# Patient Record
Sex: FEMALE | Race: WHITE | Employment: OTHER | URBAN - NONMETROPOLITAN AREA
[De-identification: names, ages, dates, MRNs, and addresses within clinical notes are randomized per-mention and may not be internally consistent; named-entity substitution may affect disease eponyms.]

---

## 2017-09-08 LAB
CREATININE: 0.7 MG/DL
POTASSIUM (K+): 4.1

## 2017-11-30 RX ORDER — ACETAMINOPHEN 500 MG
500 TABLET ORAL 2 TIMES DAILY
COMMUNITY
End: 2018-02-06 | Stop reason: CLARIF

## 2017-11-30 RX ORDER — LANOLIN ALCOHOL/MO/W.PET/CERES
1000 CREAM (GRAM) TOPICAL DAILY
Status: ON HOLD | COMMUNITY
End: 2020-05-28

## 2017-11-30 RX ORDER — DULOXETIN HYDROCHLORIDE 60 MG/1
60 CAPSULE, DELAYED RELEASE ORAL DAILY
COMMUNITY
End: 2018-02-06 | Stop reason: SDUPTHER

## 2017-11-30 RX ORDER — DOCUSATE SODIUM 250 MG
250 CAPSULE ORAL 2 TIMES DAILY
Status: ON HOLD | COMMUNITY
End: 2020-05-28

## 2017-11-30 RX ORDER — TRAMADOL HYDROCHLORIDE 50 MG/1
50 TABLET ORAL EVERY 8 HOURS PRN
COMMUNITY
End: 2017-12-07 | Stop reason: SDUPTHER

## 2017-11-30 RX ORDER — M-VIT,TX,IRON,MINS/CALC/FOLIC 27MG-0.4MG
1 TABLET ORAL DAILY
COMMUNITY

## 2017-11-30 RX ORDER — ATENOLOL 25 MG/1
25 TABLET ORAL 2 TIMES DAILY
COMMUNITY
End: 2018-01-22 | Stop reason: RX

## 2017-11-30 RX ORDER — RANITIDINE 300 MG/1
300 TABLET ORAL NIGHTLY
COMMUNITY
End: 2018-02-05 | Stop reason: SDUPTHER

## 2017-12-07 ENCOUNTER — OFFICE VISIT (OUTPATIENT)
Dept: FAMILY MEDICINE CLINIC | Age: 77
End: 2017-12-07
Payer: MEDICARE

## 2017-12-07 VITALS
SYSTOLIC BLOOD PRESSURE: 150 MMHG | BODY MASS INDEX: 31.78 KG/M2 | HEART RATE: 80 BPM | WEIGHT: 222 LBS | DIASTOLIC BLOOD PRESSURE: 80 MMHG | HEIGHT: 70 IN | RESPIRATION RATE: 18 BRPM

## 2017-12-07 DIAGNOSIS — M25.512 CHRONIC PAIN OF BOTH SHOULDERS: ICD-10-CM

## 2017-12-07 DIAGNOSIS — I10 ESSENTIAL HYPERTENSION: ICD-10-CM

## 2017-12-07 DIAGNOSIS — G89.29 CHRONIC PAIN OF BOTH SHOULDERS: ICD-10-CM

## 2017-12-07 DIAGNOSIS — M25.511 CHRONIC PAIN OF BOTH SHOULDERS: ICD-10-CM

## 2017-12-07 DIAGNOSIS — E11.9 TYPE 2 DIABETES MELLITUS WITHOUT COMPLICATION, WITHOUT LONG-TERM CURRENT USE OF INSULIN (HCC): ICD-10-CM

## 2017-12-07 DIAGNOSIS — I25.10 CORONARY ARTERY DISEASE INVOLVING NATIVE CORONARY ARTERY OF NATIVE HEART WITHOUT ANGINA PECTORIS: ICD-10-CM

## 2017-12-07 DIAGNOSIS — F41.9 ANXIETY: ICD-10-CM

## 2017-12-07 DIAGNOSIS — K21.9 GASTROESOPHAGEAL REFLUX DISEASE WITHOUT ESOPHAGITIS: ICD-10-CM

## 2017-12-07 PROCEDURE — 99204 OFFICE O/P NEW MOD 45 MIN: CPT | Performed by: INTERNAL MEDICINE

## 2017-12-07 PROCEDURE — G8417 CALC BMI ABV UP PARAM F/U: HCPCS | Performed by: INTERNAL MEDICINE

## 2017-12-07 PROCEDURE — G8599 NO ASA/ANTIPLAT THER USE RNG: HCPCS | Performed by: INTERNAL MEDICINE

## 2017-12-07 PROCEDURE — G8427 DOCREV CUR MEDS BY ELIG CLIN: HCPCS | Performed by: INTERNAL MEDICINE

## 2017-12-07 PROCEDURE — 1036F TOBACCO NON-USER: CPT | Performed by: INTERNAL MEDICINE

## 2017-12-07 PROCEDURE — 3046F HEMOGLOBIN A1C LEVEL >9.0%: CPT | Performed by: INTERNAL MEDICINE

## 2017-12-07 PROCEDURE — 1123F ACP DISCUSS/DSCN MKR DOCD: CPT | Performed by: INTERNAL MEDICINE

## 2017-12-07 PROCEDURE — 1090F PRES/ABSN URINE INCON ASSESS: CPT | Performed by: INTERNAL MEDICINE

## 2017-12-07 PROCEDURE — G8482 FLU IMMUNIZE ORDER/ADMIN: HCPCS | Performed by: INTERNAL MEDICINE

## 2017-12-07 PROCEDURE — G8400 PT W/DXA NO RESULTS DOC: HCPCS | Performed by: INTERNAL MEDICINE

## 2017-12-07 PROCEDURE — 4040F PNEUMOC VAC/ADMIN/RCVD: CPT | Performed by: INTERNAL MEDICINE

## 2017-12-07 PROCEDURE — 3017F COLORECTAL CA SCREEN DOC REV: CPT | Performed by: INTERNAL MEDICINE

## 2017-12-07 RX ORDER — PANTOPRAZOLE SODIUM 40 MG/1
40 TABLET, DELAYED RELEASE ORAL DAILY
Qty: 30 TABLET | Refills: 3 | Status: SHIPPED | OUTPATIENT
Start: 2017-12-07 | End: 2018-04-28 | Stop reason: SDUPTHER

## 2017-12-07 RX ORDER — PHENOL 1.4 %
1 AEROSOL, SPRAY (ML) MUCOUS MEMBRANE 2 TIMES DAILY PRN
COMMUNITY
End: 2019-12-21 | Stop reason: ALTCHOICE

## 2017-12-07 RX ORDER — TRAMADOL HYDROCHLORIDE 50 MG/1
50 TABLET ORAL EVERY 8 HOURS PRN
Qty: 60 TABLET | Refills: 0 | Status: SHIPPED | OUTPATIENT
Start: 2017-12-07 | End: 2018-01-19 | Stop reason: SDUPTHER

## 2017-12-07 RX ORDER — OMEPRAZOLE 40 MG/1
40 CAPSULE, DELAYED RELEASE ORAL DAILY
COMMUNITY
End: 2017-12-07 | Stop reason: SDUPTHER

## 2017-12-07 ASSESSMENT — ENCOUNTER SYMPTOMS
ORTHOPNEA: 0
DIARRHEA: 0
CONSTIPATION: 0
CHOKING: 0
HEARTBURN: 1
SHORTNESS OF BREATH: 0
WHEEZING: 0
ABDOMINAL PAIN: 0
COUGH: 1
BELCHING: 1
BLURRED VISION: 0
SORE THROAT: 0
STRIDOR: 0
SINUS PAIN: 0
NAUSEA: 0

## 2017-12-07 NOTE — PROGRESS NOTES
started more than 1 year ago. There has been no history of extremity trauma. The problem occurs daily. The problem has been unchanged. The quality of the pain is described as aching. Pertinent negatives include no fever, joint locking, joint swelling, numbness or tingling. The symptoms are aggravated by activity. She has tried oral narcotics, cold and heat (steroid injections by Dr. Gloria Huffman) for the symptoms. The treatment provided moderate relief. Her past medical history is significant for osteoarthritis. Diabetes   She has type 2 diabetes mellitus. Her disease course has been improving. Hypoglycemia symptoms include nervousness/anxiousness. Pertinent negatives for hypoglycemia include no dizziness, headaches or tremors. Associated symptoms include chest pain. Pertinent negatives for diabetes include no blurred vision, no foot paresthesias, no weakness and no weight loss. Symptoms are improving. There are no diabetic complications. When asked about current treatments, none (diet controlled) were reported. Her weight is stable. She is following a generally healthy, low salt, vegetarian and diabetic diet. She has not had a previous visit with a dietitian. An ACE inhibitor/angiotensin II receptor blocker is not being taken. Eye exam is current.            Past Medical History:     Past Medical History:   Diagnosis Date    Anemia     Chicken pox     Chronic back pain     Diabetes mellitus ([address]     Gastroesophageal reflux disease without esophagitis 12/7/2017    Headache     Hypertension     Measles     Mumps     Osteoarthritis     Whooping cough       Reviewed all health maintenance requirements and ordered appropriate tests  Health Maintenance Due   Topic Date Due    DTaP/Tdap/Td vaccine (1 - Tdap) 09/18/1961    Lipid screen  09/18/1982    Colon cancer screen colonoscopy  09/18/1992    Zostavax vaccine  09/18/2002    DEXA (modify frequency per FRAX score)  09/18/2007    Pneumococcal low/med risk (1 of 2 - PCV13) 09/18/2007       Past Surgical History:     Past Surgical History:   Procedure Laterality Date    CARPAL TUNNEL RELEASE Left 2005    JOINT REPLACEMENT Right 2004    right knee    JOINT REPLACEMENT Left 2005    left knee    JOINT REPLACEMENT Right 2016    right knee    JOINT REPLACEMENT Right 2001    right hip    JOINT REPLACEMENT Left 2006    left hip        Medications:       Prior to Admission medications    Medication Sig Start Date End Date Taking? Authorizing Provider   calcium carbonate 600 MG TABS tablet Take 1 tablet by mouth 2 times daily as needed   Yes Historical Provider, MD   traMADol (ULTRAM) 50 MG tablet Take 1 tablet by mouth every 8 hours as needed for Pain .  12/7/17  Yes Camila Rodriguez MD   pantoprazole (PROTONIX) 40 MG tablet Take 1 tablet by mouth daily 12/7/17  Yes Camila Rodriguez MD   atenolol (TENORMIN) 25 MG tablet Take 25 mg by mouth 2 times daily   Yes Historical Provider, MD   DULoxetine (CYMBALTA) 60 MG extended release capsule Take 60 mg by mouth daily   Yes Historical Provider, MD   ranitidine (ZANTAC) 300 MG tablet Take 300 mg by mouth nightly   Yes Historical Provider, MD   diclofenac sodium 1 % GEL Apply 2 g topically 2 times daily   Yes Historical Provider, MD   vitamin B-12 (CYANOCOBALAMIN) 1000 MCG tablet Take 1,000 mcg by mouth daily   Yes Historical Provider, MD   vitamin D (CHOLECALCIFEROL) 1000 UNIT TABS tablet Take 1,000 Units by mouth 2 times daily   Yes Historical Provider, MD   Glucosamine-Chondroit-Vit C-Mn (GLUCOSAMINE 1500 COMPLEX PO) Take 1 capsule by mouth daily   Yes Historical Provider, MD   docusate sodium (COLACE) 250 MG capsule Take 250 mg by mouth 2 times daily   Yes Historical Provider, MD   Multiple Vitamins-Minerals (THERAPEUTIC MULTIVITAMIN-MINERALS) tablet Take 1 tablet by mouth daily   Yes Historical Provider, MD   acetaminophen (TYLENOL) 500 MG tablet Take 500 mg by mouth 2 times daily   Yes Historical Provider, MD        Allergies: Cardiovascular: Normal rate, regular rhythm and normal heart sounds. No murmur heard. Pulmonary/Chest: Effort normal and breath sounds normal. She has no wheezes. She has no rales. Abdominal: Soft. Bowel sounds are normal. She exhibits no distension and no mass. There is no tenderness. Musculoskeletal: Normal range of motion. She exhibits tenderness (both shoulders). She exhibits no edema or deformity. Lymphadenopathy:     She has no cervical adenopathy. Neurological: She is alert and oriented to person, place, and time. No cranial nerve deficit. Coordination (gait abnormality due to left foot drop) abnormal.   Skin: Skin is warm and dry. No rash noted. Psychiatric: She has a normal mood and affect. Her behavior is normal. Judgment normal.   Vitals reviewed. Data:     No results found for: NA, K, CL, CO2, BUN, CREATININE, GLUCOSE, PROT, LABALBU, BILITOT, ALKPHOS, AST, ALT  No results found for: WBC, RBC, HGB, HCT, MCV, MCH, MCHC, RDW, PLT, MPV  No results found for: TSH  No results found for: CHOL, HDL, PSA, LABA1C       Assessment & Plan       1. Essential hypertension   BP today elevated. Patient attributes this to shoulder pain. We will continue Atenolol, she is instructed to monitor BP at home. If remains elevated, will adjust meds    2. Gastroesophageal reflux disease without esophagitis   Will try Protonix, continue ranitidine, avoid spicy food, prn Tums OTC pantoprazole (PROTONIX) 40 MG tablet   3. Coronary artery disease involving native coronary artery of native heart without angina pectoris   She follows up with Dr. Margaret Acosta [facility] in [place]  Allergic to ASA    4. Type 2 diabetes mellitus without complication, without long-term current use of insulin (HCC)   Diet controlled. Declined HB A1C, today, says recently had done at Dr Abbe Schwartz office. Will request records    5. Chronic pain of both shoulders   On prn Tramadol, OARRS report reviewed, no evidence of drug abuse.   Follow up

## 2018-01-19 ENCOUNTER — TELEPHONE (OUTPATIENT)
Dept: FAMILY MEDICINE CLINIC | Age: 78
End: 2018-01-19

## 2018-01-19 DIAGNOSIS — G89.29 CHRONIC PAIN OF BOTH SHOULDERS: ICD-10-CM

## 2018-01-19 DIAGNOSIS — M25.512 CHRONIC PAIN OF BOTH SHOULDERS: ICD-10-CM

## 2018-01-19 DIAGNOSIS — M25.511 CHRONIC PAIN OF BOTH SHOULDERS: ICD-10-CM

## 2018-01-19 RX ORDER — TRAMADOL HYDROCHLORIDE 50 MG/1
50 TABLET ORAL EVERY 8 HOURS PRN
Qty: 60 TABLET | Refills: 0 | Status: SHIPPED | OUTPATIENT
Start: 2018-01-19 | End: 2018-02-06 | Stop reason: SDUPTHER

## 2018-01-22 ENCOUNTER — TELEPHONE (OUTPATIENT)
Dept: FAMILY MEDICINE CLINIC | Age: 78
End: 2018-01-22

## 2018-01-22 RX ORDER — ATENOLOL 50 MG/1
25 TABLET ORAL DAILY
Qty: 30 TABLET | Refills: 3 | Status: SHIPPED | OUTPATIENT
Start: 2018-01-22 | End: 2018-01-23 | Stop reason: SDUPTHER

## 2018-01-22 NOTE — TELEPHONE ENCOUNTER
Pt was a new patient for you back in Dec.-Daughter called stating they are having trouble getting the Atenolol 25 mg which the pt takes BID-wants to know if we can send in a 50 mg tablet to [address] [place] [address]

## 2018-01-23 ENCOUNTER — TELEPHONE (OUTPATIENT)
Dept: FAMILY MEDICINE CLINIC | Age: 78
End: 2018-01-23

## 2018-01-23 RX ORDER — ATENOLOL 50 MG/1
25 TABLET ORAL 2 TIMES DAILY
Qty: 30 TABLET | Refills: 3 | Status: SHIPPED | OUTPATIENT
Start: 2018-01-23 | End: 2018-02-06 | Stop reason: DRUGHIGH

## 2018-02-05 ENCOUNTER — TELEPHONE (OUTPATIENT)
Dept: FAMILY MEDICINE CLINIC | Age: 78
End: 2018-02-05

## 2018-02-05 RX ORDER — RANITIDINE 300 MG/1
300 TABLET ORAL NIGHTLY
Qty: 30 TABLET | Refills: 3 | Status: SHIPPED | OUTPATIENT
Start: 2018-02-05 | End: 2018-02-06 | Stop reason: SINTOL

## 2018-02-06 ENCOUNTER — OFFICE VISIT (OUTPATIENT)
Dept: FAMILY MEDICINE CLINIC | Age: 78
End: 2018-02-06
Payer: MEDICARE

## 2018-02-06 ENCOUNTER — HOSPITAL ENCOUNTER (INPATIENT)
Age: 78
LOS: 1 days | Discharge: HOME | DRG: 310 | End: 2018-02-08
Attending: INTERNAL MEDICINE | Admitting: INTERNAL MEDICINE
Payer: MEDICARE

## 2018-02-06 ENCOUNTER — HOSPITAL ENCOUNTER (OUTPATIENT)
Age: 78
Discharge: HOME | End: 2018-02-06
Payer: MEDICARE

## 2018-02-06 ENCOUNTER — APPOINTMENT (OUTPATIENT)
Dept: GENERAL RADIOLOGY | Age: 78
DRG: 310 | End: 2018-02-06
Payer: MEDICARE

## 2018-02-06 VITALS
RESPIRATION RATE: 18 BRPM | DIASTOLIC BLOOD PRESSURE: 80 MMHG | HEART RATE: 72 BPM | BODY MASS INDEX: 33.07 KG/M2 | HEIGHT: 70 IN | WEIGHT: 231 LBS | SYSTOLIC BLOOD PRESSURE: 124 MMHG

## 2018-02-06 DIAGNOSIS — G89.29 CHRONIC PAIN OF BOTH SHOULDERS: ICD-10-CM

## 2018-02-06 DIAGNOSIS — K21.9 GASTROESOPHAGEAL REFLUX DISEASE WITHOUT ESOPHAGITIS: ICD-10-CM

## 2018-02-06 DIAGNOSIS — I48.91 ATRIAL FIBRILLATION WITH RVR (HCC): Primary | ICD-10-CM

## 2018-02-06 DIAGNOSIS — M25.511 CHRONIC PAIN OF BOTH SHOULDERS: ICD-10-CM

## 2018-02-06 DIAGNOSIS — I49.9 IRREGULAR HEART BEAT: ICD-10-CM

## 2018-02-06 DIAGNOSIS — I10 ESSENTIAL HYPERTENSION: ICD-10-CM

## 2018-02-06 DIAGNOSIS — M25.512 CHRONIC PAIN OF BOTH SHOULDERS: ICD-10-CM

## 2018-02-06 DIAGNOSIS — E11.9 TYPE 2 DIABETES MELLITUS WITHOUT COMPLICATION, WITHOUT LONG-TERM CURRENT USE OF INSULIN (HCC): Primary | ICD-10-CM

## 2018-02-06 LAB
% CKMB: 5 % (ref 0–3)
-: NORMAL
ABSOLUTE EOS #: 0.1 K/UL (ref 0–0.4)
ABSOLUTE IMMATURE GRANULOCYTE: ABNORMAL K/UL (ref 0–0.3)
ABSOLUTE LYMPH #: 3.5 K/UL (ref 1–4.8)
ABSOLUTE MONO #: 0.8 K/UL (ref 0–1)
ALBUMIN SERPL-MCNC: 4.3 G/DL (ref 3.5–5.2)
ALBUMIN/GLOBULIN RATIO: ABNORMAL (ref 1–2.5)
ALP BLD-CCNC: 64 U/L (ref 35–104)
ALT SERPL-CCNC: 12 U/L (ref 5–33)
AMORPHOUS: NORMAL
ANION GAP SERPL CALCULATED.3IONS-SCNC: 12 MMOL/L (ref 9–17)
ANION GAP SERPL CALCULATED.3IONS-SCNC: 14 MMOL/L (ref 9–17)
AST SERPL-CCNC: 20 U/L
BACTERIA: NORMAL
BASOPHILS # BLD: 1 % (ref 0–2)
BASOPHILS ABSOLUTE: 0 K/UL (ref 0–0.2)
BILIRUB SERPL-MCNC: 1.28 MG/DL (ref 0.3–1.2)
BILIRUBIN URINE: NEGATIVE
BUN BLDV-MCNC: 19 MG/DL (ref 8–23)
BUN BLDV-MCNC: 20 MG/DL (ref 8–23)
BUN/CREAT BLD: 34 (ref 9–20)
BUN/CREAT BLD: 37 (ref 9–20)
CALCIUM SERPL-MCNC: 10.9 MG/DL (ref 8.6–10.4)
CALCIUM SERPL-MCNC: 11 MG/DL (ref 8.6–10.4)
CASTS UA: NORMAL /LPF
CHLORIDE BLD-SCNC: 98 MMOL/L (ref 98–107)
CHLORIDE BLD-SCNC: 99 MMOL/L (ref 98–107)
CK MB: 2.6 NG/ML
CKMB INTERPRETATION: ABNORMAL
CO2: 26 MMOL/L (ref 20–31)
CO2: 28 MMOL/L (ref 20–31)
COLOR: YELLOW
COMMENT UA: ABNORMAL
CREAT SERPL-MCNC: 0.51 MG/DL (ref 0.5–0.9)
CREAT SERPL-MCNC: 0.59 MG/DL (ref 0.5–0.9)
CRYSTALS, UA: NORMAL /HPF
DIFFERENTIAL TYPE: YES
EKG ATRIAL RATE: 120 BPM
EKG ATRIAL RATE: 125 BPM
EKG Q-T INTERVAL: 306 MS
EKG Q-T INTERVAL: 310 MS
EKG QRS DURATION: 94 MS
EKG QRS DURATION: 96 MS
EKG QTC CALCULATION (BAZETT): 426 MS
EKG QTC CALCULATION (BAZETT): 427 MS
EKG R AXIS: 15 DEGREES
EKG R AXIS: 61 DEGREES
EKG T AXIS: -17 DEGREES
EKG T AXIS: 37 DEGREES
EKG VENTRICULAR RATE: 114 BPM
EKG VENTRICULAR RATE: 117 BPM
EOSINOPHILS RELATIVE PERCENT: 2 % (ref 0–5)
EPITHELIAL CELLS UA: NORMAL /HPF
GFR AFRICAN AMERICAN: >60 ML/MIN
GFR AFRICAN AMERICAN: >60 ML/MIN
GFR NON-AFRICAN AMERICAN: >60 ML/MIN
GFR NON-AFRICAN AMERICAN: >60 ML/MIN
GFR SERPL CREATININE-BSD FRML MDRD: ABNORMAL ML/MIN/{1.73_M2}
GLUCOSE BLD-MCNC: 138 MG/DL (ref 70–99)
GLUCOSE BLD-MCNC: 155 MG/DL (ref 70–99)
GLUCOSE URINE: NEGATIVE
HBA1C MFR BLD: 6.4 %
HCT VFR BLD CALC: 43.8 % (ref 36–46)
HEMOGLOBIN: 14.9 G/DL (ref 12–16)
IMMATURE GRANULOCYTES: ABNORMAL %
KETONES, URINE: NEGATIVE
LEUKOCYTE ESTERASE, URINE: ABNORMAL
LYMPHOCYTES # BLD: 41 % (ref 15–40)
MCH RBC QN AUTO: 31.9 PG (ref 26–34)
MCHC RBC AUTO-ENTMCNC: 34 G/DL (ref 31–37)
MCV RBC AUTO: 93.7 FL (ref 80–100)
MONOCYTES # BLD: 9 % (ref 4–8)
MUCUS: NORMAL
NITRITE, URINE: NEGATIVE
NRBC AUTOMATED: ABNORMAL PER 100 WBC
OTHER OBSERVATIONS UA: NORMAL
PARTIAL THROMBOPLASTIN TIME: 25 SEC (ref 21–33)
PDW BLD-RTO: 12.2 % (ref 12.1–15.2)
PH UA: 7 (ref 5–8)
PLATELET # BLD: 235 K/UL (ref 140–450)
PLATELET ESTIMATE: ABNORMAL
PMV BLD AUTO: ABNORMAL FL (ref 6–12)
POTASSIUM SERPL-SCNC: 4 MMOL/L (ref 3.7–5.3)
POTASSIUM SERPL-SCNC: 4.5 MMOL/L (ref 3.7–5.3)
PROTEIN UA: NEGATIVE
RBC # BLD: 4.68 M/UL (ref 4–5.2)
RBC # BLD: ABNORMAL 10*6/UL
RBC UA: NORMAL /HPF (ref 0–2)
RENAL EPITHELIAL, UA: NORMAL /HPF
SEG NEUTROPHILS: 47 % (ref 47–75)
SEGMENTED NEUTROPHILS ABSOLUTE COUNT: 4 K/UL (ref 2.5–7)
SODIUM BLD-SCNC: 138 MMOL/L (ref 135–144)
SODIUM BLD-SCNC: 139 MMOL/L (ref 135–144)
SPECIFIC GRAVITY UA: 1.01 (ref 1–1.03)
TOTAL CK: 52 U/L (ref 26–192)
TOTAL PROTEIN: 6.9 G/DL (ref 6.4–8.3)
TRICHOMONAS: NORMAL
TROPONIN INTERP: NORMAL
TROPONIN INTERP: NORMAL
TROPONIN T: <0.03 NG/ML
TROPONIN T: <0.03 NG/ML
TSH SERPL DL<=0.05 MIU/L-ACNC: 3.54 MIU/L (ref 0.3–5)
TURBIDITY: CLEAR
URINE HGB: NEGATIVE
UROBILINOGEN, URINE: NORMAL
WBC # BLD: 8.5 K/UL (ref 3.5–11)
WBC # BLD: ABNORMAL 10*3/UL
WBC UA: NORMAL /HPF
YEAST: NORMAL

## 2018-02-06 PROCEDURE — 94760 N-INVAS EAR/PLS OXIMETRY 1: CPT

## 2018-02-06 PROCEDURE — 80053 COMPREHEN METABOLIC PANEL: CPT

## 2018-02-06 PROCEDURE — 96374 THER/PROPH/DIAG INJ IV PUSH: CPT

## 2018-02-06 PROCEDURE — G0378 HOSPITAL OBSERVATION PER HR: HCPCS

## 2018-02-06 PROCEDURE — 82550 ASSAY OF CK (CPK): CPT

## 2018-02-06 PROCEDURE — 84484 ASSAY OF TROPONIN QUANT: CPT

## 2018-02-06 PROCEDURE — 93005 ELECTROCARDIOGRAM TRACING: CPT

## 2018-02-06 PROCEDURE — 2500000003 HC RX 250 WO HCPCS: Performed by: INTERNAL MEDICINE

## 2018-02-06 PROCEDURE — 85025 COMPLETE CBC W/AUTO DIFF WBC: CPT

## 2018-02-06 PROCEDURE — 1123F ACP DISCUSS/DSCN MKR DOCD: CPT | Performed by: INTERNAL MEDICINE

## 2018-02-06 PROCEDURE — G8417 CALC BMI ABV UP PARAM F/U: HCPCS | Performed by: INTERNAL MEDICINE

## 2018-02-06 PROCEDURE — 83036 HEMOGLOBIN GLYCOSYLATED A1C: CPT | Performed by: INTERNAL MEDICINE

## 2018-02-06 PROCEDURE — 85730 THROMBOPLASTIN TIME PARTIAL: CPT

## 2018-02-06 PROCEDURE — G8427 DOCREV CUR MEDS BY ELIG CLIN: HCPCS | Performed by: INTERNAL MEDICINE

## 2018-02-06 PROCEDURE — 3044F HG A1C LEVEL LT 7.0%: CPT | Performed by: INTERNAL MEDICINE

## 2018-02-06 PROCEDURE — 4040F PNEUMOC VAC/ADMIN/RCVD: CPT | Performed by: INTERNAL MEDICINE

## 2018-02-06 PROCEDURE — G8599 NO ASA/ANTIPLAT THER USE RNG: HCPCS | Performed by: INTERNAL MEDICINE

## 2018-02-06 PROCEDURE — 99285 EMERGENCY DEPT VISIT HI MDM: CPT

## 2018-02-06 PROCEDURE — 82553 CREATINE MB FRACTION: CPT

## 2018-02-06 PROCEDURE — G8482 FLU IMMUNIZE ORDER/ADMIN: HCPCS | Performed by: INTERNAL MEDICINE

## 2018-02-06 PROCEDURE — 36415 COLL VENOUS BLD VENIPUNCTURE: CPT

## 2018-02-06 PROCEDURE — 84443 ASSAY THYROID STIM HORMONE: CPT

## 2018-02-06 PROCEDURE — 6370000000 HC RX 637 (ALT 250 FOR IP): Performed by: INTERNAL MEDICINE

## 2018-02-06 PROCEDURE — 1090F PRES/ABSN URINE INCON ASSESS: CPT | Performed by: INTERNAL MEDICINE

## 2018-02-06 PROCEDURE — 80048 BASIC METABOLIC PNL TOTAL CA: CPT

## 2018-02-06 PROCEDURE — 6360000002 HC RX W HCPCS: Performed by: INTERNAL MEDICINE

## 2018-02-06 PROCEDURE — G8400 PT W/DXA NO RESULTS DOC: HCPCS | Performed by: INTERNAL MEDICINE

## 2018-02-06 PROCEDURE — 81001 URINALYSIS AUTO W/SCOPE: CPT

## 2018-02-06 PROCEDURE — 96372 THER/PROPH/DIAG INJ SC/IM: CPT

## 2018-02-06 PROCEDURE — 2580000003 HC RX 258: Performed by: INTERNAL MEDICINE

## 2018-02-06 PROCEDURE — 1036F TOBACCO NON-USER: CPT | Performed by: INTERNAL MEDICINE

## 2018-02-06 PROCEDURE — 6370000000 HC RX 637 (ALT 250 FOR IP)

## 2018-02-06 PROCEDURE — 99214 OFFICE O/P EST MOD 30 MIN: CPT | Performed by: INTERNAL MEDICINE

## 2018-02-06 PROCEDURE — 3017F COLORECTAL CA SCREEN DOC REV: CPT | Performed by: INTERNAL MEDICINE

## 2018-02-06 PROCEDURE — 71045 X-RAY EXAM CHEST 1 VIEW: CPT

## 2018-02-06 RX ORDER — SODIUM CHLORIDE 0.9 % (FLUSH) 0.9 %
10 SYRINGE (ML) INJECTION EVERY 12 HOURS SCHEDULED
Status: DISCONTINUED | OUTPATIENT
Start: 2018-02-06 | End: 2018-02-08 | Stop reason: HOSPADM

## 2018-02-06 RX ORDER — FAMOTIDINE 20 MG/1
20 TABLET, FILM COATED ORAL DAILY
Status: DISCONTINUED | OUTPATIENT
Start: 2018-02-06 | End: 2018-02-08 | Stop reason: HOSPADM

## 2018-02-06 RX ORDER — SODIUM CHLORIDE 0.9 % (FLUSH) 0.9 %
10 SYRINGE (ML) INJECTION PRN
Status: DISCONTINUED | OUTPATIENT
Start: 2018-02-06 | End: 2018-02-08 | Stop reason: HOSPADM

## 2018-02-06 RX ORDER — PANTOPRAZOLE SODIUM 40 MG/1
40 TABLET, DELAYED RELEASE ORAL DAILY
Status: DISCONTINUED | OUTPATIENT
Start: 2018-02-07 | End: 2018-02-08 | Stop reason: HOSPADM

## 2018-02-06 RX ORDER — DULOXETIN HYDROCHLORIDE 30 MG/1
60 CAPSULE, DELAYED RELEASE ORAL DAILY
Status: DISCONTINUED | OUTPATIENT
Start: 2018-02-06 | End: 2018-02-08 | Stop reason: HOSPADM

## 2018-02-06 RX ORDER — RANITIDINE 150 MG/1
150 TABLET ORAL DAILY
Qty: 30 TABLET | Refills: 3 | Status: ON HOLD | OUTPATIENT
Start: 2018-02-06 | End: 2018-02-07

## 2018-02-06 RX ORDER — TRAMADOL HYDROCHLORIDE 50 MG/1
50 TABLET ORAL EVERY 8 HOURS PRN
Status: DISCONTINUED | OUTPATIENT
Start: 2018-02-06 | End: 2018-02-08 | Stop reason: HOSPADM

## 2018-02-06 RX ORDER — DULOXETIN HYDROCHLORIDE 60 MG/1
60 CAPSULE, DELAYED RELEASE ORAL DAILY
Qty: 30 CAPSULE | Refills: 3 | Status: ON HOLD | OUTPATIENT
Start: 2018-02-06 | End: 2018-02-07

## 2018-02-06 RX ORDER — LANOLIN ALCOHOL/MO/W.PET/CERES
1000 CREAM (GRAM) TOPICAL DAILY
Status: DISCONTINUED | OUTPATIENT
Start: 2018-02-06 | End: 2018-02-08 | Stop reason: HOSPADM

## 2018-02-06 RX ORDER — ONDANSETRON 2 MG/ML
4 INJECTION INTRAMUSCULAR; INTRAVENOUS EVERY 6 HOURS PRN
Status: DISCONTINUED | OUTPATIENT
Start: 2018-02-06 | End: 2018-02-08 | Stop reason: HOSPADM

## 2018-02-06 RX ORDER — ATENOLOL 50 MG/1
50 TABLET ORAL DAILY
Qty: 30 TABLET | Refills: 3 | Status: ON HOLD
Start: 2018-02-06 | End: 2018-02-07

## 2018-02-06 RX ORDER — M-VIT,TX,IRON,MINS/CALC/FOLIC 27MG-0.4MG
1 TABLET ORAL DAILY
Status: DISCONTINUED | OUTPATIENT
Start: 2018-02-06 | End: 2018-02-08 | Stop reason: HOSPADM

## 2018-02-06 RX ORDER — ATENOLOL 25 MG/1
50 TABLET ORAL DAILY
Status: DISCONTINUED | OUTPATIENT
Start: 2018-02-06 | End: 2018-02-08 | Stop reason: HOSPADM

## 2018-02-06 RX ORDER — ACETAMINOPHEN 325 MG/1
650 TABLET ORAL EVERY 4 HOURS PRN
Status: DISCONTINUED | OUTPATIENT
Start: 2018-02-06 | End: 2018-02-08 | Stop reason: HOSPADM

## 2018-02-06 RX ORDER — TRAMADOL HYDROCHLORIDE 50 MG/1
50 TABLET ORAL EVERY 8 HOURS PRN
Qty: 60 TABLET | Refills: 2 | Status: SHIPPED | OUTPATIENT
Start: 2018-02-06 | End: 2018-03-09

## 2018-02-06 RX ORDER — DILTIAZEM HYDROCHLORIDE 5 MG/ML
15 INJECTION INTRAVENOUS ONCE
Status: COMPLETED | OUTPATIENT
Start: 2018-02-06 | End: 2018-02-06

## 2018-02-06 RX ORDER — DOCUSATE SODIUM 250 MG
250 CAPSULE ORAL 2 TIMES DAILY
Status: DISCONTINUED | OUTPATIENT
Start: 2018-02-06 | End: 2018-02-07

## 2018-02-06 RX ADMIN — Medication 10 ML: at 22:57

## 2018-02-06 RX ADMIN — DILTIAZEM HYDROCHLORIDE 15 MG: 5 INJECTION INTRAVENOUS at 15:53

## 2018-02-06 RX ADMIN — DILTIAZEM HYDROCHLORIDE 30 MG: 30 TABLET, FILM COATED ORAL at 22:30

## 2018-02-06 RX ADMIN — ENOXAPARIN SODIUM 100 MG: 100 INJECTION SUBCUTANEOUS at 21:40

## 2018-02-06 RX ADMIN — TRAMADOL HYDROCHLORIDE 50 MG: 50 TABLET, FILM COATED ORAL at 21:39

## 2018-02-06 ASSESSMENT — ENCOUNTER SYMPTOMS
BLOOD IN STOOL: 0
COUGH: 0
BLURRED VISION: 0
ABDOMINAL PAIN: 0
SORE THROAT: 0
SHORTNESS OF BREATH: 0
HEARTBURN: 0
VOMITING: 0
NAUSEA: 0

## 2018-02-06 ASSESSMENT — PAIN SCALES - GENERAL
PAINLEVEL_OUTOF10: 3
PAINLEVEL_OUTOF10: 5

## 2018-02-06 NOTE — ED PROVIDER NOTES
History:   Diagnosis Date    Anemia     Chicken pox     Chronic back pain     Diabetes mellitus ([facility] [address]     Gastroesophageal reflux disease without esophagitis 12/7/2017    Headache     Hypertension     Measles     Mumps     Osteoarthritis     Whooping cough          SURGICAL HISTORY       Past Surgical History:   Procedure Laterality Date    CARPAL TUNNEL RELEASE Left 2005    JOINT REPLACEMENT Right 2004    right knee    JOINT REPLACEMENT Left 2005    left knee    JOINT REPLACEMENT Right 2016    right knee    JOINT REPLACEMENT Right 2001    right hip    JOINT REPLACEMENT Left 2006    left hip    TOE SURGERY Left 2006    joint removed from 2nd toe         CURRENT MEDICATIONS       Current Discharge Medication List      CONTINUE these medications which have NOT CHANGED    Details   atenolol (TENORMIN) 50 MG tablet Take 1 tablet by mouth daily  Qty: 30 tablet, Refills: 3      ranitidine (ZANTAC) 150 MG tablet Take 1 tablet by mouth daily  Qty: 30 tablet, Refills: 3      DULoxetine (CYMBALTA) 60 MG extended release capsule Take 1 capsule by mouth daily  Qty: 30 capsule, Refills: 3      traMADol (ULTRAM) 50 MG tablet Take 1 tablet by mouth every 8 hours as needed for Pain for up to 31 days.   Qty: 60 tablet, Refills: 2    Associated Diagnoses: Chronic pain of both shoulders      calcium carbonate 600 MG TABS tablet Take 1 tablet by mouth 2 times daily as needed      pantoprazole (PROTONIX) 40 MG tablet Take 1 tablet by mouth daily  Qty: 30 tablet, Refills: 3    Associated Diagnoses: Essential hypertension      vitamin B-12 (CYANOCOBALAMIN) 1000 MCG tablet Take 1,000 mcg by mouth daily      vitamin D (CHOLECALCIFEROL) 1000 UNIT TABS tablet Take 1,000 Units by mouth 2 times daily      Glucosamine-Chondroit-Vit C-Mn (GLUCOSAMINE 1500 COMPLEX PO) Take 1 capsule by mouth daily      docusate sodium (COLACE) 250 MG capsule Take 250 mg by mouth 2 times daily      Multiple Vitamins-Minerals (THERAPEUTIC MULTIVITAMIN-MINERALS) tablet Take 1 tablet by mouth daily      diclofenac sodium 1 % GEL Apply 2 g topically 2 times daily             ALLERGIES     Penicillins; Sulfa antibiotics; and Aspirin    FAMILY HISTORY       Family History   Problem Relation Age of Onset    High Blood Pressure Mother     Parkinsonism Mother     High Blood Pressure Father     Diabetes Father     Heart Attack Father     Diabetes Sister     High Blood Pressure Sister     Arrhythmia Sister     Diabetes Brother     High Blood Pressure Brother           SOCIAL HISTORY       Social History     Social History    Marital status:      Spouse name: N/A    Number of children: N/A    Years of education: N/A     Social History Main Topics    Smoking status: Never Smoker    Smokeless tobacco: Never Used    Alcohol use No    Drug use: No    Sexual activity: Not Asked     Other Topics Concern    None     Social History Narrative    None       SCREENINGS    [place] Coma Scale  Eye Opening: Spontaneous  Best Verbal Response: Oriented  Best Motor Response: Obeys commands  [place] Coma Scale Score: 15        PHYSICAL EXAM    (up to 7 for level 4, 8 or more for level 5)     ED Triage Vitals [02/06/18 1539]   BP Temp Temp Source Pulse Resp SpO2 Height Weight   (!) 150/100 98 °F (36.7 °C) Oral 122 20 98 % 5' 10\" (1.778 m) 231 lb (104.8 kg)       Physical Exam  Physical Exam   Constitutional:  Appears well-developed and well-nourished. No distress noted. Non toxic in appearance  HENT:     Head: Normocephalic and atraumatic. Mouth/Throat: Oropharynx is clear and mucosa moist. No oropharyngeal exudate noted. Eyes: Conjunctivae and EOM are normal. Pupils are equal, round, and reactive to light. No scleral icterus. Neck: Normal range of motion. Neck supple. No tracheal deviation present. Cardiovascular: Tachycardia, irregular rhythm. Normal S1 and S2 and intact distal pulses. Exam reveals no gallop or friction rub.   No murmur

## 2018-02-06 NOTE — PROGRESS NOTES
HPI Notes    Name: Yaneli Cortes  : 1940         Chief Complaint:     Chief Complaint   Patient presents with    Diabetes     discuss her meds    Hypertension    Anxiety    Coronary Artery Disease    Gastroesophageal Reflux       History of Present Illness: The patient is here to discuss meds for GERD , follow up for DM, HTN     She wants to cut back on Ranitidine due to history of \"Kidney problems\"   She is happy with Protonix, says it works better than Omeprazole in the past    She also wants to take Atenolol 50 mg daily instead of 25 mg bid. She has no other complaints today. Diabetes   She presents for her follow-up diabetic visit. She has type 2 diabetes mellitus. No MedicAlert identification noted. Her disease course has been improving. There are no hypoglycemic associated symptoms. Pertinent negatives for hypoglycemia include no dizziness, headaches or nervousness/anxiousness. Pertinent negatives for diabetes include no blurred vision, no chest pain, no fatigue, no polydipsia, no polyphagia and no weakness. There are no hypoglycemic complications. Symptoms are improving. Diabetic complications include heart disease. Pertinent negatives for diabetic complications include no CVA or PVD. Risk factors for coronary artery disease include diabetes mellitus, post-menopausal, obesity and hypertension. When asked about current treatments, none were reported. Her weight is stable. She is following a generally healthy diet. When asked about meal planning, she reported none. She has not had a previous visit with a dietitian. She never participates in exercise. Home blood sugar record trend: does not check. She sees a podiatrist.Eye exam is current. Hypertension   This is a chronic problem. The current episode started more than 1 year ago. The problem is unchanged. The problem is controlled.  Pertinent negatives include no blurred vision, chest pain, headaches, palpitations or shortness of

## 2018-02-07 LAB
LV EF: 60 %
LVEF MODALITY: NORMAL
TROPONIN INTERP: NORMAL
TROPONIN T: <0.03 NG/ML

## 2018-02-07 PROCEDURE — 6370000000 HC RX 637 (ALT 250 FOR IP): Performed by: INTERNAL MEDICINE

## 2018-02-07 PROCEDURE — G0378 HOSPITAL OBSERVATION PER HR: HCPCS

## 2018-02-07 PROCEDURE — 1200000000 HC SEMI PRIVATE

## 2018-02-07 PROCEDURE — 6360000002 HC RX W HCPCS: Performed by: INTERNAL MEDICINE

## 2018-02-07 PROCEDURE — 2580000003 HC RX 258: Performed by: INTERNAL MEDICINE

## 2018-02-07 PROCEDURE — 93306 TTE W/DOPPLER COMPLETE: CPT

## 2018-02-07 PROCEDURE — 96372 THER/PROPH/DIAG INJ SC/IM: CPT

## 2018-02-07 PROCEDURE — 94761 N-INVAS EAR/PLS OXIMETRY MLT: CPT

## 2018-02-07 RX ORDER — ATENOLOL 50 MG/1
25 TABLET ORAL 2 TIMES DAILY
COMMUNITY
End: 2018-09-07 | Stop reason: SDUPTHER

## 2018-02-07 RX ORDER — RANITIDINE 300 MG/1
300 TABLET ORAL NIGHTLY
Status: ON HOLD | COMMUNITY
End: 2018-02-08 | Stop reason: HOSPADM

## 2018-02-07 RX ORDER — LEVOFLOXACIN 250 MG/1
250 TABLET ORAL DAILY
Status: DISCONTINUED | OUTPATIENT
Start: 2018-02-07 | End: 2018-02-08 | Stop reason: HOSPADM

## 2018-02-07 RX ORDER — DULOXETIN HYDROCHLORIDE 60 MG/1
60 CAPSULE, DELAYED RELEASE ORAL DAILY
Status: DISCONTINUED | OUTPATIENT
Start: 2018-02-07 | End: 2018-02-08 | Stop reason: SDUPTHER

## 2018-02-07 RX ORDER — ATENOLOL 50 MG/1
25 TABLET ORAL 2 TIMES DAILY
Status: DISCONTINUED | OUTPATIENT
Start: 2018-02-07 | End: 2018-02-07 | Stop reason: DRUGHIGH

## 2018-02-07 RX ORDER — DULOXETIN HYDROCHLORIDE 60 MG/1
60 CAPSULE, DELAYED RELEASE ORAL DAILY
COMMUNITY
End: 2018-05-07 | Stop reason: SDUPTHER

## 2018-02-07 RX ORDER — DOCUSATE SODIUM 100 MG/1
200 CAPSULE, LIQUID FILLED ORAL 2 TIMES DAILY
Status: DISCONTINUED | OUTPATIENT
Start: 2018-02-07 | End: 2018-02-07

## 2018-02-07 RX ORDER — RANITIDINE 300 MG/1
300 TABLET ORAL NIGHTLY
Status: DISCONTINUED | OUTPATIENT
Start: 2018-02-07 | End: 2018-02-08 | Stop reason: CLARIF

## 2018-02-07 RX ADMIN — DILTIAZEM HYDROCHLORIDE 30 MG: 30 TABLET, FILM COATED ORAL at 06:31

## 2018-02-07 RX ADMIN — Medication 200 MG: at 23:23

## 2018-02-07 RX ADMIN — ENOXAPARIN SODIUM 100 MG: 100 INJECTION SUBCUTANEOUS at 11:55

## 2018-02-07 RX ADMIN — DILTIAZEM HYDROCHLORIDE 60 MG: 30 TABLET, FILM COATED ORAL at 22:45

## 2018-02-07 RX ADMIN — Medication 600 MG: at 22:45

## 2018-02-07 RX ADMIN — Medication 10 ML: at 22:46

## 2018-02-07 RX ADMIN — CHOLECALCIFEROL TAB 25 MCG (1000 UNIT) 1000 UNITS: 25 TAB at 22:45

## 2018-02-07 RX ADMIN — RANITIDINE 300 MG: 300 TABLET ORAL at 18:53

## 2018-02-07 RX ADMIN — DULOXETINE HYDROCHLORIDE 60 MG: 30 CAPSULE, DELAYED RELEASE ORAL at 08:25

## 2018-02-07 RX ADMIN — LEVOFLOXACIN 250 MG: 250 TABLET, FILM COATED ORAL at 01:39

## 2018-02-07 RX ADMIN — PANTOPRAZOLE SODIUM 40 MG: 40 TABLET, DELAYED RELEASE ORAL at 08:25

## 2018-02-07 RX ADMIN — ENOXAPARIN SODIUM 100 MG: 100 INJECTION SUBCUTANEOUS at 22:46

## 2018-02-07 RX ADMIN — TRAMADOL HYDROCHLORIDE 50 MG: 50 TABLET, FILM COATED ORAL at 23:23

## 2018-02-07 RX ADMIN — ATENOLOL 50 MG: 25 TABLET ORAL at 08:25

## 2018-02-07 RX ADMIN — DILTIAZEM HYDROCHLORIDE 60 MG: 30 TABLET, FILM COATED ORAL at 15:53

## 2018-02-07 RX ADMIN — Medication 10 ML: at 08:26

## 2018-02-07 ASSESSMENT — PAIN SCALES - GENERAL
PAINLEVEL_OUTOF10: 4
PAINLEVEL_OUTOF10: 0

## 2018-02-07 NOTE — PROGRESS NOTES
Pt ambulated back and forth from bed to BR last night. Her gait is fairly steady. Positions herself in bed. LCTA BS are present. Wakes up this AM and says that she feels so much better. Call light within her reach.

## 2018-02-07 NOTE — PLAN OF CARE
Problem: Safety:  Goal: Free from accidental physical injury  Free from accidental physical injury   Outcome: Ongoing  Ambulates to BR and then back to bed without incident.

## 2018-02-07 NOTE — PROGRESS NOTES
Home med list in progress as pt unable to recall medications - will need verified once medications are present.

## 2018-02-07 NOTE — H&P
RELEASE Left 2005    JOINT REPLACEMENT Right 2004    right knee    JOINT REPLACEMENT Left 2005    left knee    JOINT REPLACEMENT Right 2016    right knee    JOINT REPLACEMENT Right 2001    right hip    JOINT REPLACEMENT Left 2006    left hip    TOE SURGERY Left 2006    joint removed from 2nd toe       Home Medications:   No current facility-administered medications on file prior to encounter. Current Outpatient Prescriptions on File Prior to Encounter   Medication Sig Dispense Refill    traMADol (ULTRAM) 50 MG tablet Take 1 tablet by mouth every 8 hours as needed for Pain for up to 31 days. 60 tablet 2    calcium carbonate 600 MG TABS tablet Take 1 tablet by mouth 2 times daily as needed      pantoprazole (PROTONIX) 40 MG tablet Take 1 tablet by mouth daily 30 tablet 3    vitamin B-12 (CYANOCOBALAMIN) 1000 MCG tablet Take 1,000 mcg by mouth daily      vitamin D (CHOLECALCIFEROL) 1000 UNIT TABS tablet Take 1,000 Units by mouth 2 times daily      Glucosamine-Chondroit-Vit C-Mn (GLUCOSAMINE 1500 COMPLEX PO) Take 1 capsule by mouth daily      docusate sodium (COLACE) 250 MG capsule Take 250 mg by mouth 2 times daily      Multiple Vitamins-Minerals (THERAPEUTIC MULTIVITAMIN-MINERALS) tablet Take 1 tablet by mouth daily      diclofenac sodium 1 % GEL Apply 2 g topically 2 times daily         Allergies:  Penicillins; Sulfa antibiotics; and Aspirin    Social History:    reports that she has never smoked. She has never used smokeless tobacco. She reports that she does not drink alcohol or use drugs.     Family History:       Problem Relation Age of Onset    High Blood Pressure Mother     Parkinsonism Mother     High Blood Pressure Father     Diabetes Father     Heart Attack Father     Diabetes Sister     High Blood Pressure Sister     Arrhythmia Sister     Diabetes Brother     High Blood Pressure Brother        Review of systems:  Constitutional: no fever, no night sweats  Head: no headache, no head injury  Eye: no blurring of vision, no double vision. Ears: no hearing difficulty, no tinnitus  Mouth/throat: no dysphagia, no sore throat   Lungs: no cough, no shortness of breath, no wheeze  CVS: no palpitation, no chest pain, no shortness of breath  GI: no abdominal pain, no nausea , no vomiting, no constipation  JACQUELINE: no dysuria, frequency and urgency  Musculoskeletal: Positive for chronic bilateral shoulder pain  Endocrine: no polyuria, polydypsia, no cold or heat intolerence  Hematology: no anemia. Positive history of nosebleeds due to aspirin  Dermatology: no skin rash  Psychiatry: no depression, no anxiety,no panic attacks  Neurology: no syncope, no seizures, no numbness or tingling of hands, no numbness or tingling of feet, no paresis      Vitals:   Vitals:    02/07/18 0936   BP: 147/75   Pulse: 109   Resp: 18   Temp: 97.6   SpO2: 96%      BMI: Body mass index is 32.76 kg/m².     Physical Exam:  General Appearance: alert and oriented to person, place and time, in no acute distress  Cardiovascular: Tachycardic, irregularly irregular, no murmurs, rubs, clicks, or gallops, distal pulses intact  Pulmonary/Chest: clear to auscultation bilaterally- no wheezes, rales or rhonchi, normal air movement, no respiratory distress  Abdomen: soft, non-tender, non-distended, normal bowel sounds, no masses   Extremities: no cyanosis, clubbing or edema, pedal pulses 2+  Skin: warm and dry, no rash or erythema  Head: normocephalic and atraumatic, oral mucosa moist, pharynx without hyperemia or exudates  Eyes: pupils equal, round, and reactive to light  Neck: supple and non-tender without mass, no thyromegaly   Musculoskeletal:  no joint swelling, deformity or tenderness  Neurological: alert, oriented, normal speech, no focal findings or movement disorder noted    Review of Labs and Diagnostic Testing:    Recent Results (from the past 24 hour(s))   POCT glycosylated hemoglobin (Hb A1C)    Collection Time: 02/06/18  2:14 PM Result Value Ref Range    Hemoglobin A1C 6.4 %   EKG 12 lead    Collection Time: 02/06/18  2:36 PM   Result Value Ref Range    Ventricular Rate 117 BPM    Atrial Rate 120 BPM    QRS Duration 96 ms    Q-T Interval 306 ms    QTc Calculation (Bazett) 426 ms    R Axis 61 degrees    T Axis 37 degrees   Basic Metabolic Panel    Collection Time: 02/06/18  3:10 PM   Result Value Ref Range    Glucose 155 (H) 70 - 99 mg/dL    BUN 20 8 - 23 mg/dL    CREATININE 0.59 0.50 - 0.90 mg/dL    Bun/Cre Ratio 34 (H) 9 - 20    Calcium 11.0 (H) 8.6 - 10.4 mg/dL    Sodium 138 135 - 144 mmol/L    Potassium 4.5 3.7 - 5.3 mmol/L    Chloride 98 98 - 107 mmol/L    CO2 28 20 - 31 mmol/L    Anion Gap 12 9 - 17 mmol/L    GFR Non-African American >60 >60 mL/min    GFR African American >60 >60 mL/min    GFR Comment          GFR Staging NOT REPORTED    Urinalysis    Collection Time: 02/06/18  3:45 PM   Result Value Ref Range    Color, UA YELLOW YEL    Turbidity UA CLEAR CLEAR    Glucose, Ur NEGATIVE NEG    Bilirubin Urine NEGATIVE NEG    Ketones, Urine NEGATIVE NEG    Specific Gravity, UA 1.015 1.005 - 1.030    Urine Hgb NEGATIVE NEG    pH, UA 7.0 5.0 - 8.0    Protein, UA NEGATIVE NEG    Urobilinogen, Urine Normal NORM    Nitrite, Urine NEGATIVE NEG    Leukocyte Esterase, Urine 2+ (A) NEG    Urinalysis Comments         Microscopic Urinalysis    Collection Time: 02/06/18  3:45 PM   Result Value Ref Range    -          WBC, UA 5 TO 10 0 /HPF    RBC, UA 5 TO 10 0 - 2 /HPF    Casts UA NOT REPORTED /LPF    Crystals UA NOT REPORTED NONE /HPF    Epithelial Cells UA 5 TO 10 /HPF    Renal Epithelial, Urine NOT REPORTED 0 /HPF    Bacteria, UA NOT REPORTED NONE    Mucus, UA NOT REPORTED NONE    Trichomonas, UA NOT REPORTED NONE    Amorphous, UA NOT REPORTED NONE    Other Observations UA NOT REPORTED NREQ    Yeast, UA NOT REPORTED NONE   Troponin    Collection Time: 02/06/18  3:49 PM   Result Value Ref Range    Troponin T <0.03 <0.03 ng/mL    Troponin Interp

## 2018-02-07 NOTE — PLAN OF CARE
Problem: Falls - Risk of  Goal: Absence of falls  Outcome: Ongoing  Pt just arrives to floor - no falls at this time. Will continue to monitor.      Problem: Discharge Planning:  Goal: Patients continuum of care needs are met  Patients continuum of care needs are met  Outcome: Met This Shift

## 2018-02-07 NOTE — PROGRESS NOTES
Nutrition Assessment    Type and Reason for Visit: Initial    Nutrition Recommendations: modify diet to CC 4 carbs per meal    Malnutrition Assessment:  · Malnutrition Status: Insufficient data  · Context: Acute illness or injury  · Findings of the 6 clinical characteristics of malnutrition (Minimum of 2 out of 6 clinical characteristics is required to make the diagnosis of moderate or severe Protein Calorie Malnutrition based on AND/ASPEN Guidelines):  1. Energy Intake-Not available,      2. Weight Loss-Unable to assess,    3. Fat Loss-Unable to assess,    4. Muscle Loss-Unable to assess,    5.  Fluid Accumulation-Mild fluid accumulation, Extremities (+ 1 non-pitting edema R/L LE)  6.  Strength-Not measured    Nutrition Diagnosis:   · Problem: Altered nutrition-related lab values  · Etiology: related to Endocrine dysfunction     Signs and symptoms:  as evidenced by Lab values (A1c 6.4)    Nutrition Assessment:  · Subjective Assessment: Pt receiving a test at attempted visit  · Nutrition-Focused Physical Findings: unable to assess  · Wound Type: None  · Current Nutrition Therapies:  · Oral Diet Orders:  (Carbohydrate controlled)   · Oral Diet intake: %, Select  · Oral Nutrition Supplement (ONS) Orders: None  · Anthropometric Measures:  · Ht: 5' 10\" (177.8 cm)   · Current Body Wt: 228 lb 4.8 oz (103.6 kg)  · Admission Body Wt: 231 lb (104.8 kg)  · Usual Body Wt: 228 lb (103.4 kg) (222-231 per historical weights)  · Ideal Body Wt: 150 lb (68 kg), % Ideal Body 152%  · BMI Classification: BMI 30.0 - 34.9 Obese Class I  Wt Readings from Last 10 Encounters:   02/06/18 228 lb 4.8 oz (103.6 kg)   02/06/18 231 lb (104.8 kg)   12/07/17 222 lb (100.7 kg)     Recent Labs      02/06/18   1510  02/06/18   1549   NA  138  139   K  4.5  4.0   CL  98  99   CO2  28  26   BUN  20  19   CREATININE  0.59  0.51   GLUCOSE  155*  138*   ALT   --   12   ALKPHOS   --   64   GFR        NOT REPORTED        NOT REPORTED      Lab

## 2018-02-08 VITALS
HEART RATE: 110 BPM | HEIGHT: 70 IN | OXYGEN SATURATION: 96 % | DIASTOLIC BLOOD PRESSURE: 78 MMHG | TEMPERATURE: 98.1 F | BODY MASS INDEX: 32.97 KG/M2 | SYSTOLIC BLOOD PRESSURE: 134 MMHG | RESPIRATION RATE: 18 BRPM | WEIGHT: 230.31 LBS

## 2018-02-08 PROCEDURE — 6370000000 HC RX 637 (ALT 250 FOR IP): Performed by: INTERNAL MEDICINE

## 2018-02-08 PROCEDURE — 99222 1ST HOSP IP/OBS MODERATE 55: CPT | Performed by: INTERNAL MEDICINE

## 2018-02-08 PROCEDURE — 6360000002 HC RX W HCPCS: Performed by: INTERNAL MEDICINE

## 2018-02-08 PROCEDURE — 96372 THER/PROPH/DIAG INJ SC/IM: CPT

## 2018-02-08 PROCEDURE — 94761 N-INVAS EAR/PLS OXIMETRY MLT: CPT

## 2018-02-08 RX ORDER — RANITIDINE 150 MG/1
150 TABLET ORAL DAILY
Qty: 30 TABLET | Refills: 3 | Status: SHIPPED | OUTPATIENT
Start: 2018-02-08 | End: 2018-05-07 | Stop reason: SDUPTHER

## 2018-02-08 RX ORDER — DILTIAZEM HYDROCHLORIDE 180 MG/1
180 CAPSULE, COATED, EXTENDED RELEASE ORAL DAILY
Qty: 30 CAPSULE | Refills: 3 | Status: SHIPPED | OUTPATIENT
Start: 2018-02-08 | End: 2018-05-07 | Stop reason: SDUPTHER

## 2018-02-08 RX ORDER — DOCUSATE SODIUM 100 MG/1
200 CAPSULE, LIQUID FILLED ORAL 2 TIMES DAILY
Status: DISCONTINUED | OUTPATIENT
Start: 2018-02-08 | End: 2018-02-08 | Stop reason: HOSPADM

## 2018-02-08 RX ADMIN — LEVOFLOXACIN 250 MG: 250 TABLET, FILM COATED ORAL at 08:35

## 2018-02-08 RX ADMIN — DILTIAZEM HYDROCHLORIDE 60 MG: 30 TABLET, FILM COATED ORAL at 15:12

## 2018-02-08 RX ADMIN — DULOXETINE HYDROCHLORIDE 60 MG: 30 CAPSULE, DELAYED RELEASE ORAL at 09:02

## 2018-02-08 RX ADMIN — DILTIAZEM HYDROCHLORIDE 60 MG: 30 TABLET, FILM COATED ORAL at 07:00

## 2018-02-08 RX ADMIN — Medication 600 MG: at 08:35

## 2018-02-08 RX ADMIN — CHOLECALCIFEROL TAB 25 MCG (1000 UNIT) 1000 UNITS: 25 TAB at 08:35

## 2018-02-08 RX ADMIN — TRAMADOL HYDROCHLORIDE 50 MG: 50 TABLET, FILM COATED ORAL at 10:37

## 2018-02-08 RX ADMIN — ENOXAPARIN SODIUM 100 MG: 100 INJECTION SUBCUTANEOUS at 08:35

## 2018-02-08 RX ADMIN — PANTOPRAZOLE SODIUM 40 MG: 40 TABLET, DELAYED RELEASE ORAL at 08:36

## 2018-02-08 RX ADMIN — DOCUSATE SODIUM 200 MG: 100 CAPSULE, LIQUID FILLED ORAL at 08:58

## 2018-02-08 RX ADMIN — ATENOLOL 50 MG: 25 TABLET ORAL at 08:34

## 2018-02-08 ASSESSMENT — PAIN SCALES - GENERAL
PAINLEVEL_OUTOF10: 6
PAINLEVEL_OUTOF10: 2

## 2018-02-09 NOTE — DISCHARGE SUMMARY
activities    Follow-up:  Follow-up with Peggy West on 2/14/18    Consultants:  Dr. Pedro Morocho    CBC:   Recent Labs      02/06/18   1549   WBC  8.5   HGB  14.9   PLT  235     BMP:  Recent Labs      02/06/18   1549   NA  139   K  4.0   CL  99   CO2  26   BUN  19   CREATININE  0.51   GLUCOSE  138*     Calcium:  Recent Labs      02/06/18   1549   CALCIUM  10.9*           Physical Exam:    Vitals:Patient Vitals for the past 24 hrs:   BP Temp Temp src Pulse Resp SpO2   02/08/18 1130 - - - - - 96 %   02/08/18 0715 134/78 98.1 °F (36.7 °C) Oral 110 18 98 %     General Appearance: alert and oriented to person, place and time, in no acute distress  Cardiovascular: Tachycardic, irregularly irregular, no murmurs  Pulmonary/Chest: clear to auscultation bilaterally- no wheezes  Abdomen: soft, non-tender, non-distended, normal bowel sounds  Extremities: no cyanosis, clubbing or edema  Skin: warm and dry, no rash or erythema      Hospital Course: The patient is a 79-year-old woman who was seen in my office for routine office visit and found to have irregular heartbeat on examination. She had outpatient EKG showing new-onset A. fib with RVR. She was instructed to go to the emergency room for further workup. Her heart rate was 122. She was treated with IV Cardizem in the emergency room after which her heart rate stabilized she was admitted initially for observation, started on oral Cardizem. She continued to have intermittent episodes of A. fib with RVR. We increased oral Cardizem. She did not require Cardizem drip. She was started on anticoagulation with subcu Lovenox. Dr. Pedro oMrocho was consulted for further evaluation. The patient had a 2-D echo showing normal LV function with EF 60%, moderate MR, mildly enlarged right sided chambers, severely enlarged LA. It was recommended that she be treated with NOAC. She was relatively stable and we felt that she can be discharged with oral Cardizem 180 mg a day.   She is also prescribed Xarelto for stroke prophylaxis as her YOANA score is 5. The patient is established with , [facility] heart cardiology group in [facility].   She is to follow-up with him on 2/14/18        Disposition: home    Condition: Stable    Time Spent: 32 minutes      Electronically signed by Dionisio Atkins MD on 2/9/2018 at 4:31 AM  Discharging Hospitalist

## 2018-04-27 ENCOUNTER — OFFICE VISIT (OUTPATIENT)
Dept: FAMILY MEDICINE CLINIC | Age: 78
End: 2018-04-27
Payer: MEDICARE

## 2018-04-27 VITALS
WEIGHT: 230 LBS | HEIGHT: 70 IN | HEART RATE: 68 BPM | SYSTOLIC BLOOD PRESSURE: 120 MMHG | DIASTOLIC BLOOD PRESSURE: 70 MMHG | RESPIRATION RATE: 18 BRPM | BODY MASS INDEX: 32.93 KG/M2

## 2018-04-27 DIAGNOSIS — Z91.81 AT HIGH RISK FOR FALLS: ICD-10-CM

## 2018-04-27 DIAGNOSIS — J01.01 ACUTE RECURRENT MAXILLARY SINUSITIS: ICD-10-CM

## 2018-04-27 DIAGNOSIS — K62.5 RECTAL BLEED: Primary | ICD-10-CM

## 2018-04-27 PROBLEM — H35.3131 NONEXUDATIVE AGE-RELATED MACULAR DEGENERATION, BILATERAL, EARLY DRY STAGE: Status: ACTIVE | Noted: 2017-06-28

## 2018-04-27 PROCEDURE — G8427 DOCREV CUR MEDS BY ELIG CLIN: HCPCS | Performed by: INTERNAL MEDICINE

## 2018-04-27 PROCEDURE — 3288F FALL RISK ASSESSMENT DOCD: CPT | Performed by: INTERNAL MEDICINE

## 2018-04-27 PROCEDURE — 4040F PNEUMOC VAC/ADMIN/RCVD: CPT | Performed by: INTERNAL MEDICINE

## 2018-04-27 PROCEDURE — G8417 CALC BMI ABV UP PARAM F/U: HCPCS | Performed by: INTERNAL MEDICINE

## 2018-04-27 PROCEDURE — 1123F ACP DISCUSS/DSCN MKR DOCD: CPT | Performed by: INTERNAL MEDICINE

## 2018-04-27 PROCEDURE — G8400 PT W/DXA NO RESULTS DOC: HCPCS | Performed by: INTERNAL MEDICINE

## 2018-04-27 PROCEDURE — G8598 ASA/ANTIPLAT THER USED: HCPCS | Performed by: INTERNAL MEDICINE

## 2018-04-27 PROCEDURE — 1090F PRES/ABSN URINE INCON ASSESS: CPT | Performed by: INTERNAL MEDICINE

## 2018-04-27 PROCEDURE — 1036F TOBACCO NON-USER: CPT | Performed by: INTERNAL MEDICINE

## 2018-04-27 PROCEDURE — 99213 OFFICE O/P EST LOW 20 MIN: CPT | Performed by: INTERNAL MEDICINE

## 2018-04-27 PROCEDURE — 3017F COLORECTAL CA SCREEN DOC REV: CPT | Performed by: INTERNAL MEDICINE

## 2018-04-27 RX ORDER — TRAMADOL HYDROCHLORIDE 50 MG/1
TABLET ORAL
Refills: 0 | COMMUNITY
Start: 2018-04-20 | End: 2018-05-07 | Stop reason: SDUPTHER

## 2018-04-27 RX ORDER — APIXABAN 5 MG/1
TABLET, FILM COATED ORAL
Refills: 0 | COMMUNITY
Start: 2018-04-16 | End: 2018-09-17 | Stop reason: SDUPTHER

## 2018-04-27 RX ORDER — FLUTICASONE PROPIONATE 50 MCG
1 SPRAY, SUSPENSION (ML) NASAL DAILY
Qty: 1 BOTTLE | Refills: 1 | Status: SHIPPED | OUTPATIENT
Start: 2018-04-27 | End: 2018-06-11 | Stop reason: SDUPTHER

## 2018-04-27 ASSESSMENT — PATIENT HEALTH QUESTIONNAIRE - PHQ9
1. LITTLE INTEREST OR PLEASURE IN DOING THINGS: 1
SUM OF ALL RESPONSES TO PHQ9 QUESTIONS 1 & 2: 1
SUM OF ALL RESPONSES TO PHQ QUESTIONS 1-9: 1
2. FEELING DOWN, DEPRESSED OR HOPELESS: 0

## 2018-04-27 ASSESSMENT — ENCOUNTER SYMPTOMS
SINUS COMPLAINT: 1
BLOOD IN STOOL: 1
SINUS PAIN: 0
HEARTBURN: 0
CONSTIPATION: 0
COUGH: 1
EYE DISCHARGE: 0
SINUS PRESSURE: 1
ABDOMINAL PAIN: 0
HEMATEMESIS: 0
WHEEZING: 0
VOMITING: 0
DIARRHEA: 0
EYE REDNESS: 0
RECTAL PAIN: 0
SPUTUM PRODUCTION: 0
BLURRED VISION: 0
SORE THROAT: 1
SHORTNESS OF BREATH: 0
HOARSE VOICE: 0
HEMATOCHEZIA: 1
NAUSEA: 0

## 2018-04-28 DIAGNOSIS — I10 ESSENTIAL HYPERTENSION: ICD-10-CM

## 2018-04-30 RX ORDER — PANTOPRAZOLE SODIUM 40 MG/1
TABLET, DELAYED RELEASE ORAL
Qty: 30 TABLET | Refills: 3 | Status: SHIPPED | OUTPATIENT
Start: 2018-04-30 | End: 2019-01-09

## 2018-05-07 ENCOUNTER — OFFICE VISIT (OUTPATIENT)
Dept: FAMILY MEDICINE CLINIC | Age: 78
End: 2018-05-07
Payer: MEDICARE

## 2018-05-07 VITALS
DIASTOLIC BLOOD PRESSURE: 77 MMHG | BODY MASS INDEX: 32.21 KG/M2 | SYSTOLIC BLOOD PRESSURE: 158 MMHG | HEIGHT: 70 IN | WEIGHT: 225 LBS

## 2018-05-07 DIAGNOSIS — M25.512 CHRONIC PAIN OF BOTH SHOULDERS: ICD-10-CM

## 2018-05-07 DIAGNOSIS — K21.9 GASTROESOPHAGEAL REFLUX DISEASE WITHOUT ESOPHAGITIS: ICD-10-CM

## 2018-05-07 DIAGNOSIS — F41.9 ANXIETY: ICD-10-CM

## 2018-05-07 DIAGNOSIS — G89.29 CHRONIC PAIN OF BOTH SHOULDERS: ICD-10-CM

## 2018-05-07 DIAGNOSIS — E11.9 TYPE 2 DIABETES MELLITUS WITHOUT COMPLICATION, WITHOUT LONG-TERM CURRENT USE OF INSULIN (HCC): ICD-10-CM

## 2018-05-07 DIAGNOSIS — I10 ESSENTIAL HYPERTENSION: Primary | ICD-10-CM

## 2018-05-07 DIAGNOSIS — M25.511 CHRONIC PAIN OF BOTH SHOULDERS: ICD-10-CM

## 2018-05-07 PROBLEM — I25.10 CORONARY ARTERY DISEASE INVOLVING NATIVE CORONARY ARTERY OF NATIVE HEART WITHOUT ANGINA PECTORIS: Status: RESOLVED | Noted: 2017-12-07 | Resolved: 2018-05-07

## 2018-05-07 PROCEDURE — G8417 CALC BMI ABV UP PARAM F/U: HCPCS | Performed by: INTERNAL MEDICINE

## 2018-05-07 PROCEDURE — 3288F FALL RISK ASSESSMENT DOCD: CPT | Performed by: INTERNAL MEDICINE

## 2018-05-07 PROCEDURE — 1123F ACP DISCUSS/DSCN MKR DOCD: CPT | Performed by: INTERNAL MEDICINE

## 2018-05-07 PROCEDURE — 99214 OFFICE O/P EST MOD 30 MIN: CPT | Performed by: INTERNAL MEDICINE

## 2018-05-07 PROCEDURE — G8598 ASA/ANTIPLAT THER USED: HCPCS | Performed by: INTERNAL MEDICINE

## 2018-05-07 PROCEDURE — G8400 PT W/DXA NO RESULTS DOC: HCPCS | Performed by: INTERNAL MEDICINE

## 2018-05-07 PROCEDURE — 1090F PRES/ABSN URINE INCON ASSESS: CPT | Performed by: INTERNAL MEDICINE

## 2018-05-07 PROCEDURE — 4040F PNEUMOC VAC/ADMIN/RCVD: CPT | Performed by: INTERNAL MEDICINE

## 2018-05-07 PROCEDURE — 3044F HG A1C LEVEL LT 7.0%: CPT | Performed by: INTERNAL MEDICINE

## 2018-05-07 PROCEDURE — G8427 DOCREV CUR MEDS BY ELIG CLIN: HCPCS | Performed by: INTERNAL MEDICINE

## 2018-05-07 PROCEDURE — 3017F COLORECTAL CA SCREEN DOC REV: CPT | Performed by: INTERNAL MEDICINE

## 2018-05-07 PROCEDURE — 1036F TOBACCO NON-USER: CPT | Performed by: INTERNAL MEDICINE

## 2018-05-07 PROCEDURE — 2022F DILAT RTA XM EVC RTNOPTHY: CPT | Performed by: INTERNAL MEDICINE

## 2018-05-07 RX ORDER — DULOXETIN HYDROCHLORIDE 60 MG/1
60 CAPSULE, DELAYED RELEASE ORAL DAILY
Qty: 90 CAPSULE | Refills: 1 | Status: SHIPPED | OUTPATIENT
Start: 2018-05-07 | End: 2018-09-07 | Stop reason: SDUPTHER

## 2018-05-07 RX ORDER — DILTIAZEM HYDROCHLORIDE 180 MG/1
180 CAPSULE, COATED, EXTENDED RELEASE ORAL DAILY
Qty: 90 CAPSULE | Refills: 1 | Status: SHIPPED | OUTPATIENT
Start: 2018-05-07 | End: 2018-11-08 | Stop reason: SDUPTHER

## 2018-05-07 RX ORDER — TRAMADOL HYDROCHLORIDE 50 MG/1
50 TABLET ORAL EVERY 8 HOURS PRN
Qty: 60 TABLET | Refills: 0 | Status: SHIPPED | OUTPATIENT
Start: 2018-05-07 | End: 2018-06-07

## 2018-05-07 RX ORDER — RANITIDINE 150 MG/1
150 TABLET ORAL DAILY
Qty: 90 TABLET | Refills: 1 | Status: SHIPPED | OUTPATIENT
Start: 2018-05-07 | End: 2018-09-07 | Stop reason: SDUPTHER

## 2018-05-07 ASSESSMENT — ENCOUNTER SYMPTOMS
SORE THROAT: 0
BELCHING: 0
COUGH: 0
BLURRED VISION: 0
ABDOMINAL PAIN: 0
HEARTBURN: 0
NAUSEA: 0

## 2018-06-11 ENCOUNTER — OFFICE VISIT (OUTPATIENT)
Dept: FAMILY MEDICINE CLINIC | Age: 78
End: 2018-06-11
Payer: MEDICARE

## 2018-06-11 VITALS
DIASTOLIC BLOOD PRESSURE: 80 MMHG | HEIGHT: 70 IN | BODY MASS INDEX: 32.35 KG/M2 | RESPIRATION RATE: 20 BRPM | WEIGHT: 226 LBS | SYSTOLIC BLOOD PRESSURE: 140 MMHG | HEART RATE: 72 BPM

## 2018-06-11 DIAGNOSIS — R42 DIZZINESS: Primary | ICD-10-CM

## 2018-06-11 DIAGNOSIS — G44.329 CHRONIC POST-TRAUMATIC HEADACHE, NOT INTRACTABLE: ICD-10-CM

## 2018-06-11 PROCEDURE — 1123F ACP DISCUSS/DSCN MKR DOCD: CPT | Performed by: INTERNAL MEDICINE

## 2018-06-11 PROCEDURE — G8427 DOCREV CUR MEDS BY ELIG CLIN: HCPCS | Performed by: INTERNAL MEDICINE

## 2018-06-11 PROCEDURE — G8598 ASA/ANTIPLAT THER USED: HCPCS | Performed by: INTERNAL MEDICINE

## 2018-06-11 PROCEDURE — G8417 CALC BMI ABV UP PARAM F/U: HCPCS | Performed by: INTERNAL MEDICINE

## 2018-06-11 PROCEDURE — 3288F FALL RISK ASSESSMENT DOCD: CPT | Performed by: INTERNAL MEDICINE

## 2018-06-11 PROCEDURE — 3017F COLORECTAL CA SCREEN DOC REV: CPT | Performed by: INTERNAL MEDICINE

## 2018-06-11 PROCEDURE — G8400 PT W/DXA NO RESULTS DOC: HCPCS | Performed by: INTERNAL MEDICINE

## 2018-06-11 PROCEDURE — 1036F TOBACCO NON-USER: CPT | Performed by: INTERNAL MEDICINE

## 2018-06-11 PROCEDURE — 1090F PRES/ABSN URINE INCON ASSESS: CPT | Performed by: INTERNAL MEDICINE

## 2018-06-11 PROCEDURE — 4040F PNEUMOC VAC/ADMIN/RCVD: CPT | Performed by: INTERNAL MEDICINE

## 2018-06-11 PROCEDURE — 99213 OFFICE O/P EST LOW 20 MIN: CPT | Performed by: INTERNAL MEDICINE

## 2018-06-11 RX ORDER — MECLIZINE HYDROCHLORIDE 25 MG/1
25 TABLET ORAL 3 TIMES DAILY PRN
Qty: 90 TABLET | Refills: 0 | Status: SHIPPED | OUTPATIENT
Start: 2018-06-11 | End: 2019-08-08

## 2018-06-11 RX ORDER — FLUTICASONE PROPIONATE 50 MCG
1 SPRAY, SUSPENSION (ML) NASAL DAILY
Qty: 1 BOTTLE | Refills: 5 | Status: SHIPPED | OUTPATIENT
Start: 2018-06-11 | End: 2020-05-27

## 2018-06-11 ASSESSMENT — ENCOUNTER SYMPTOMS
COUGH: 0
NAUSEA: 0
SORE THROAT: 0
SHORTNESS OF BREATH: 0
ABDOMINAL PAIN: 0
HEARTBURN: 0
BLURRED VISION: 0

## 2018-06-13 ENCOUNTER — HOSPITAL ENCOUNTER (OUTPATIENT)
Dept: CT IMAGING | Age: 78
Discharge: HOME | End: 2018-06-15
Payer: MEDICARE

## 2018-06-13 DIAGNOSIS — R42 DIZZINESS: ICD-10-CM

## 2018-06-13 DIAGNOSIS — G44.329 CHRONIC POST-TRAUMATIC HEADACHE, NOT INTRACTABLE: ICD-10-CM

## 2018-06-13 PROCEDURE — 70450 CT HEAD/BRAIN W/O DYE: CPT

## 2018-07-23 ENCOUNTER — TELEPHONE (OUTPATIENT)
Dept: FAMILY MEDICINE CLINIC | Age: 78
End: 2018-07-23

## 2018-07-23 DIAGNOSIS — M25.511 CHRONIC PAIN OF BOTH SHOULDERS: Primary | ICD-10-CM

## 2018-07-23 DIAGNOSIS — M25.512 CHRONIC PAIN OF BOTH SHOULDERS: Primary | ICD-10-CM

## 2018-07-23 DIAGNOSIS — G89.29 CHRONIC PAIN OF BOTH SHOULDERS: Primary | ICD-10-CM

## 2018-07-23 RX ORDER — TRAMADOL HYDROCHLORIDE 50 MG/1
50 TABLET ORAL EVERY 8 HOURS PRN
Qty: 60 TABLET | Refills: 0 | Status: SHIPPED | OUTPATIENT
Start: 2018-07-23 | End: 2018-08-21 | Stop reason: SDUPTHER

## 2018-08-21 DIAGNOSIS — M25.512 CHRONIC PAIN OF BOTH SHOULDERS: ICD-10-CM

## 2018-08-21 DIAGNOSIS — G89.29 CHRONIC PAIN OF BOTH SHOULDERS: ICD-10-CM

## 2018-08-21 DIAGNOSIS — M25.511 CHRONIC PAIN OF BOTH SHOULDERS: ICD-10-CM

## 2018-08-21 RX ORDER — TRAMADOL HYDROCHLORIDE 50 MG/1
TABLET ORAL
Qty: 60 TABLET | Refills: 0 | Status: SHIPPED | OUTPATIENT
Start: 2018-08-21 | End: 2018-09-17 | Stop reason: SDUPTHER

## 2018-08-21 NOTE — TELEPHONE ENCOUNTER
Tramadol 50 mg I tablet TID    [facility]    Last ov was 6/11/18    Health Maintenance   Topic Date Due    Diabetic foot exam  09/18/1952    Lipid screen  09/18/1952    DTaP/Tdap/Td vaccine (1 - Tdap) 09/18/1961    Shingles Vaccine (1 of 2 - 2 Dose Series) 09/18/1992    Flu vaccine (1) 09/01/2018    A1C test (Diabetic or Prediabetic)  02/06/2019    Potassium monitoring  02/06/2019    Creatinine monitoring  02/06/2019    Diabetic retinal exam  06/26/2019    Colon cancer screen colonoscopy  05/16/2028    DEXA (modify frequency per FRAX score)  Completed    Pneumococcal low/med risk  Completed             (applicable per patient's age: Cancer Screenings, Depression Screening, Fall Risk Screening, Immunizations)    Hemoglobin A1C (%)   Date Value   02/06/2018 6.4     AST (U/L)   Date Value   02/06/2018 20     ALT (U/L)   Date Value   02/06/2018 12     BUN (mg/dL)   Date Value   02/06/2018 19      (goal A1C is < 7)   (goal LDL is <100) need 30-50% reduction from baseline     BP Readings from Last 3 Encounters:   06/11/18 (!) 140/80   05/07/18 (!) 158/77   04/27/18 120/70    (goal /80)      All Future Testing planned in [facility]:  Lab Frequency Next Occurrence   CBC Auto Differential Once 10/08/2018       Next Visit Date:  Future Appointments  Date Time Provider Brandi Arias   11/8/2018 2:00 PM Venita Wilkinson MD [address] [place] [address]            Patient Active Problem List:     Essential hypertension     Gastroesophageal reflux disease without esophagitis     Type 2 diabetes mellitus without complication (HCC)     Chronic pain of both shoulders     Anxiety     Atrial fibrillation with RVR (HCC)     Nonexudative age-related macular degeneration, bilateral, early dry stage

## 2018-09-07 ENCOUNTER — OFFICE VISIT (OUTPATIENT)
Dept: FAMILY MEDICINE CLINIC | Age: 78
End: 2018-09-07
Payer: MEDICARE

## 2018-09-07 VITALS
WEIGHT: 226 LBS | BODY MASS INDEX: 32.35 KG/M2 | RESPIRATION RATE: 18 BRPM | DIASTOLIC BLOOD PRESSURE: 72 MMHG | HEIGHT: 70 IN | HEART RATE: 72 BPM | SYSTOLIC BLOOD PRESSURE: 130 MMHG

## 2018-09-07 DIAGNOSIS — R32 URINARY INCONTINENCE, UNSPECIFIED TYPE: Primary | ICD-10-CM

## 2018-09-07 DIAGNOSIS — F41.9 ANXIETY: ICD-10-CM

## 2018-09-07 DIAGNOSIS — K21.9 GASTROESOPHAGEAL REFLUX DISEASE WITHOUT ESOPHAGITIS: ICD-10-CM

## 2018-09-07 PROCEDURE — 3017F COLORECTAL CA SCREEN DOC REV: CPT | Performed by: INTERNAL MEDICINE

## 2018-09-07 PROCEDURE — 1036F TOBACCO NON-USER: CPT | Performed by: INTERNAL MEDICINE

## 2018-09-07 PROCEDURE — 1090F PRES/ABSN URINE INCON ASSESS: CPT | Performed by: INTERNAL MEDICINE

## 2018-09-07 PROCEDURE — 3288F FALL RISK ASSESSMENT DOCD: CPT | Performed by: INTERNAL MEDICINE

## 2018-09-07 PROCEDURE — 1123F ACP DISCUSS/DSCN MKR DOCD: CPT | Performed by: INTERNAL MEDICINE

## 2018-09-07 PROCEDURE — G8427 DOCREV CUR MEDS BY ELIG CLIN: HCPCS | Performed by: INTERNAL MEDICINE

## 2018-09-07 PROCEDURE — 99213 OFFICE O/P EST LOW 20 MIN: CPT | Performed by: INTERNAL MEDICINE

## 2018-09-07 PROCEDURE — G8400 PT W/DXA NO RESULTS DOC: HCPCS | Performed by: INTERNAL MEDICINE

## 2018-09-07 PROCEDURE — G8417 CALC BMI ABV UP PARAM F/U: HCPCS | Performed by: INTERNAL MEDICINE

## 2018-09-07 PROCEDURE — 1100F PTFALLS ASSESS-DOCD GE2>/YR: CPT | Performed by: INTERNAL MEDICINE

## 2018-09-07 PROCEDURE — 4040F PNEUMOC VAC/ADMIN/RCVD: CPT | Performed by: INTERNAL MEDICINE

## 2018-09-07 PROCEDURE — 0509F URINE INCON PLAN DOCD: CPT | Performed by: INTERNAL MEDICINE

## 2018-09-07 PROCEDURE — G8598 ASA/ANTIPLAT THER USED: HCPCS | Performed by: INTERNAL MEDICINE

## 2018-09-07 RX ORDER — TRAMADOL HYDROCHLORIDE 50 MG/1
TABLET ORAL
Qty: 60 TABLET | Refills: 0 | Status: CANCELLED | OUTPATIENT
Start: 2018-09-07 | End: 2018-10-07

## 2018-09-07 RX ORDER — DULOXETIN HYDROCHLORIDE 60 MG/1
60 CAPSULE, DELAYED RELEASE ORAL DAILY
Qty: 90 CAPSULE | Refills: 1 | Status: SHIPPED | OUTPATIENT
Start: 2018-09-07 | End: 2018-11-08 | Stop reason: SDUPTHER

## 2018-09-07 RX ORDER — ATENOLOL 50 MG/1
25 TABLET ORAL 2 TIMES DAILY
Qty: 90 TABLET | Refills: 1 | Status: SHIPPED | OUTPATIENT
Start: 2018-09-07 | End: 2018-11-08 | Stop reason: SDUPTHER

## 2018-09-07 RX ORDER — RANITIDINE 150 MG/1
150 TABLET ORAL DAILY
Qty: 90 TABLET | Refills: 1 | Status: SHIPPED | OUTPATIENT
Start: 2018-09-07 | End: 2018-11-08 | Stop reason: SDUPTHER

## 2018-09-07 ASSESSMENT — ENCOUNTER SYMPTOMS
VOMITING: 0
COUGH: 0
SHORTNESS OF BREATH: 0
BLURRED VISION: 0
SORE THROAT: 0
DIARRHEA: 0
HEARTBURN: 0
ABDOMINAL PAIN: 0
NAUSEA: 0
CONSTIPATION: 0

## 2018-09-07 NOTE — PROGRESS NOTES
HPI Notes    Name: Sigifredo Gtz  : 1940         Chief Complaint:     Chief Complaint   Patient presents with    Incontinence     having trouble holding her urine-       History of Present Illness:        Lucía Colbert presents to the office for evaluation of urinary incontinence. States that had the problem for a long time however lately it's getting worse  She used to have dribbling and now when she stands up the urine is \"gushing out\". She is going to several saturated pots per day. She reports no burning with urination, no flank pain, no abdominal pain, no fevers or chills, no nausea or vomiting. She would like a referral to urologist and does not want to try medications out of concern for side effects            Past Medical History:     Past Medical History:   Diagnosis Date    Anemia     Chicken pox     Chronic back pain     Diabetes mellitus ([address]     Gastroesophageal reflux disease without esophagitis 2017    Headache     Hypertension     Measles     Mumps     Osteoarthritis     Whooping cough       Reviewed all health maintenance requirements and ordered appropriate tests  Health Maintenance Due   Topic Date Due    Diabetic foot exam  1952    Lipid screen  1952    DTaP/Tdap/Td vaccine (1 - Tdap) 1961    Shingles Vaccine (1 of 2 - 2 Dose Series) 1992    Flu vaccine (1) 2018       Past Surgical History:     Past Surgical History:   Procedure Laterality Date    CARPAL TUNNEL RELEASE Left 2005    JOINT REPLACEMENT Right 2004    right knee    JOINT REPLACEMENT Left 2005    left knee    JOINT REPLACEMENT Right 2016    right knee    JOINT REPLACEMENT Right     right hip    JOINT REPLACEMENT Left 2006    left hip    TOE SURGERY Left 2006    joint removed from 2nd toe        Medications:       Prior to Admission medications    Medication Sig Start Date End Date Taking?  Authorizing Provider   DULoxetine (CYMBALTA) 60 MG extended release capsule does not drink alcohol. Drug Use:  reports that she does not use drugs. Family History:     Family History   Problem Relation Age of Onset    High Blood Pressure Mother     Parkinsonism Mother     High Blood Pressure Father     Diabetes Father     Heart Attack Father     Diabetes Sister     High Blood Pressure Sister     Arrhythmia Sister     Diabetes Brother     High Blood Pressure Brother        Review of Systems:         Review of Systems   Constitutional: Negative for chills, fever and weight loss. HENT: Negative for congestion and sore throat. Eyes: Negative for blurred vision. Respiratory: Negative for cough and shortness of breath. Cardiovascular: Negative for chest pain and palpitations. Gastrointestinal: Negative for abdominal pain, constipation, diarrhea, heartburn, nausea and vomiting. Genitourinary: Positive for frequency and urgency. Negative for dysuria, flank pain and hematuria. Musculoskeletal: Positive for joint pain. Skin: Negative for rash. Neurological: Positive for dizziness (occasional). Negative for headaches. Psychiatric/Behavioral: Negative for depression. The patient has insomnia. The patient is not nervous/anxious. Physical Exam:     Vitals:  /72 (Site: Left Upper Arm, Position: Sitting, Cuff Size: Medium Adult)   Pulse 72   Resp 18   Ht 5' 10\" (1.778 m)   Wt 226 lb (102.5 kg)   BMI 32.43 kg/m²       Physical Exam   Constitutional: She is oriented to person, place, and time. She appears well-developed and well-nourished. No distress. HENT:   Head: Normocephalic and atraumatic. Mouth/Throat: Oropharynx is clear and moist. No oropharyngeal exudate. Neck: No thyromegaly present. Cardiovascular: Normal rate, regular rhythm and normal heart sounds. No murmur heard. Pulmonary/Chest: Effort normal and breath sounds normal. She has no wheezes. She has no rales. Abdominal: Soft.  Bowel sounds are normal. She exhibits no distension and no mass. There is no tenderness. Musculoskeletal: Normal range of motion. She exhibits no edema or tenderness. Lymphadenopathy:     She has no cervical adenopathy. Neurological: She is alert and oriented to person, place, and time. No cranial nerve deficit. Coordination (using: Walker) abnormal.   Skin: Skin is warm and dry. No rash noted. Psychiatric: She has a normal mood and affect. Her behavior is normal. Judgment normal.   Vitals reviewed. Data:     Lab Results   Component Value Date     02/06/2018    K 4.0 02/06/2018    CL 99 02/06/2018    CO2 26 02/06/2018    BUN 19 02/06/2018    CREATININE 0.51 02/06/2018    CREATININE 0.7 09/08/2017    GLUCOSE 138 02/06/2018    PROT 6.9 02/06/2018    LABALBU 4.3 02/06/2018    BILITOT 1.28 02/06/2018    ALKPHOS 64 02/06/2018    AST 20 02/06/2018    ALT 12 02/06/2018     Lab Results   Component Value Date    WBC 8.5 02/06/2018    RBC 4.68 02/06/2018    HGB 14.9 02/06/2018    HCT 43.8 02/06/2018    MCV 93.7 02/06/2018    MCH 31.9 02/06/2018    MCHC 34.0 02/06/2018    RDW 12.2 02/06/2018     02/06/2018    MPV NOT REPORTED 02/06/2018     Lab Results   Component Value Date    TSH 3.54 02/06/2018     Lab Results   Component Value Date    LABA1C 6.4 02/06/2018          Assessment & Plan        Diagnosis Orders   1. Urinary incontinence, unspecified type   The patient will be referred to  at her request.  I offered oxybutynin trial, however the patient and her daughter are concerned about potential side effects. They would rather follow up with urologist first External Referral To Urology   2. Anxiety   Refill Cymbalta DULoxetine (CYMBALTA) 60 MG extended release capsule   3.  Gastroesophageal reflux disease without esophagitis   Refill Zantac ranitidine (ZANTAC) 150 MG tablet                   Completed Refills   Requested Prescriptions     Signed Prescriptions Disp Refills    DULoxetine (CYMBALTA) 60 MG extended release capsule 90 capsule 1     Sig: Take 1 capsule by mouth daily    ranitidine (ZANTAC) 150 MG tablet 90 tablet 1     Sig: Take 1 tablet by mouth daily    atenolol (TENORMIN) 50 MG tablet 90 tablet 1     Sig: Take 0.5 tablets by mouth 2 times daily     Return in about 3 months (around 12/7/2018) for HTN. Orders Placed This Encounter   Medications    DULoxetine (CYMBALTA) 60 MG extended release capsule     Sig: Take 1 capsule by mouth daily     Dispense:  90 capsule     Refill:  1    ranitidine (ZANTAC) 150 MG tablet     Sig: Take 1 tablet by mouth daily     Dispense:  90 tablet     Refill:  1    atenolol (TENORMIN) 50 MG tablet     Sig: Take 0.5 tablets by mouth 2 times daily     Dispense:  90 tablet     Refill:  1     Orders Placed This Encounter   Procedures    External Referral To Urology     Referral Priority:   Routine     Referral Type:   Consult for Advice and Opinion     Referral Reason:   Specialty Services Required     Referred to Provider:   Agustín Lopez MD     Requested Specialty:   Urology     Number of Visits Requested:   1         There are no Patient Instructions on file for this visit.     Electronically signed by Dalia Etienne MD on 9/7/2018 at 10:18 AM           Completed Refills   Requested Prescriptions     Signed Prescriptions Disp Refills    DULoxetine (CYMBALTA) 60 MG extended release capsule 90 capsule 1     Sig: Take 1 capsule by mouth daily    ranitidine (ZANTAC) 150 MG tablet 90 tablet 1     Sig: Take 1 tablet by mouth daily    atenolol (TENORMIN) 50 MG tablet 90 tablet 1     Sig: Take 0.5 tablets by mouth 2 times daily

## 2018-09-17 DIAGNOSIS — M25.511 CHRONIC PAIN OF BOTH SHOULDERS: ICD-10-CM

## 2018-09-17 DIAGNOSIS — G89.29 CHRONIC PAIN OF BOTH SHOULDERS: ICD-10-CM

## 2018-09-17 DIAGNOSIS — M25.512 CHRONIC PAIN OF BOTH SHOULDERS: ICD-10-CM

## 2018-09-17 RX ORDER — TRAMADOL HYDROCHLORIDE 50 MG/1
50 TABLET ORAL EVERY 8 HOURS PRN
Qty: 60 TABLET | Refills: 0 | Status: SHIPPED | OUTPATIENT
Start: 2018-09-17 | End: 2018-09-24 | Stop reason: SDUPTHER

## 2018-09-17 RX ORDER — APIXABAN 5 MG/1
5 TABLET, FILM COATED ORAL 2 TIMES DAILY
Qty: 60 TABLET | Refills: 5 | Status: SHIPPED | OUTPATIENT
Start: 2018-09-17 | End: 2019-02-08 | Stop reason: ALTCHOICE

## 2018-09-24 ENCOUNTER — TELEPHONE (OUTPATIENT)
Dept: FAMILY MEDICINE CLINIC | Age: 78
End: 2018-09-24

## 2018-09-24 DIAGNOSIS — M25.511 CHRONIC PAIN OF BOTH SHOULDERS: ICD-10-CM

## 2018-09-24 DIAGNOSIS — G89.29 CHRONIC PAIN OF BOTH SHOULDERS: ICD-10-CM

## 2018-09-24 DIAGNOSIS — M25.512 CHRONIC PAIN OF BOTH SHOULDERS: ICD-10-CM

## 2018-09-24 RX ORDER — TRAMADOL HYDROCHLORIDE 50 MG/1
50 TABLET ORAL EVERY 8 HOURS PRN
Qty: 60 TABLET | Refills: 0 | Status: SHIPPED | OUTPATIENT
Start: 2018-09-24 | End: 2018-10-25 | Stop reason: SDUPTHER

## 2018-09-24 NOTE — TELEPHONE ENCOUNTER
Please send Rx Tramadol to [facility]  Correct pharmacy selected in patients chart    Not able to get Rx that was given on the 17th    Health Maintenance   Topic Date Due    DTaP/Tdap/Td vaccine (1 - Tdap) 09/18/1961    Shingles Vaccine (1 of 2 - 2 Dose Series) 09/18/1992    Flu vaccine (1) 09/01/2018    Potassium monitoring  02/06/2019    Creatinine monitoring  02/06/2019    DEXA (modify frequency per FRAX score)  Completed    Pneumococcal low/med risk  Completed             (applicable per patient's age: Cancer Screenings, Depression Screening, Fall Risk Screening, Immunizations)    Hemoglobin A1C (%)   Date Value   02/06/2018 6.4     AST (U/L)   Date Value   02/06/2018 20     ALT (U/L)   Date Value   02/06/2018 12     BUN (mg/dL)   Date Value   02/06/2018 19      (goal A1C is < 7)   (goal LDL is <100) need 30-50% reduction from baseline     BP Readings from Last 3 Encounters:   09/07/18 130/72   06/11/18 (!) 140/80   05/07/18 (!) 158/77    (goal /80)      All Future Testing planned in [facility]:  Lab Frequency Next Occurrence   CBC Auto Differential Once 10/08/2018       Next Visit Date:  Future Appointments  Date Time Provider Brandi Arias   11/8/2018 2:00 PM Ninfa Stockton MD [facility] [address]            Patient Active Problem List:     Essential hypertension     Gastroesophageal reflux disease without esophagitis     Type 2 diabetes mellitus without complication (HCC)     Chronic pain of both shoulders     Anxiety     Atrial fibrillation with RVR (HCC)     Nonexudative age-related macular degeneration, bilateral, early dry stage

## 2018-10-25 ENCOUNTER — TELEPHONE (OUTPATIENT)
Dept: FAMILY MEDICINE CLINIC | Age: 78
End: 2018-10-25

## 2018-10-25 DIAGNOSIS — G89.29 CHRONIC PAIN OF BOTH SHOULDERS: ICD-10-CM

## 2018-10-25 DIAGNOSIS — M67.911 DISORDER OF RIGHT ROTATOR CUFF: Primary | ICD-10-CM

## 2018-10-25 DIAGNOSIS — M25.512 CHRONIC PAIN OF BOTH SHOULDERS: ICD-10-CM

## 2018-10-25 DIAGNOSIS — M25.511 CHRONIC PAIN OF BOTH SHOULDERS: ICD-10-CM

## 2018-10-25 RX ORDER — TRAMADOL HYDROCHLORIDE 50 MG/1
50 TABLET ORAL EVERY 8 HOURS PRN
Qty: 60 TABLET | Refills: 0 | Status: SHIPPED | OUTPATIENT
Start: 2018-10-25 | End: 2018-11-08 | Stop reason: SDUPTHER

## 2018-10-26 ENCOUNTER — NURSE ONLY (OUTPATIENT)
Dept: FAMILY MEDICINE CLINIC | Age: 78
End: 2018-10-26
Payer: MEDICARE

## 2018-10-26 DIAGNOSIS — Z23 NEED FOR INFLUENZA VACCINATION: Primary | ICD-10-CM

## 2018-10-26 PROCEDURE — 90686 IIV4 VACC NO PRSV 0.5 ML IM: CPT | Performed by: INTERNAL MEDICINE

## 2018-10-26 PROCEDURE — G0008 ADMIN INFLUENZA VIRUS VAC: HCPCS | Performed by: INTERNAL MEDICINE

## 2018-11-08 ENCOUNTER — OFFICE VISIT (OUTPATIENT)
Dept: FAMILY MEDICINE CLINIC | Age: 78
End: 2018-11-08
Payer: MEDICARE

## 2018-11-08 VITALS
BODY MASS INDEX: 32.07 KG/M2 | RESPIRATION RATE: 18 BRPM | HEIGHT: 70 IN | HEART RATE: 68 BPM | WEIGHT: 224 LBS | DIASTOLIC BLOOD PRESSURE: 70 MMHG | SYSTOLIC BLOOD PRESSURE: 114 MMHG

## 2018-11-08 DIAGNOSIS — E11.9 TYPE 2 DIABETES MELLITUS WITHOUT COMPLICATION, WITHOUT LONG-TERM CURRENT USE OF INSULIN (HCC): Primary | ICD-10-CM

## 2018-11-08 DIAGNOSIS — F41.9 ANXIETY: ICD-10-CM

## 2018-11-08 DIAGNOSIS — I10 ESSENTIAL HYPERTENSION: ICD-10-CM

## 2018-11-08 DIAGNOSIS — M25.511 CHRONIC PAIN OF BOTH SHOULDERS: ICD-10-CM

## 2018-11-08 DIAGNOSIS — G89.29 CHRONIC PAIN OF BOTH SHOULDERS: ICD-10-CM

## 2018-11-08 DIAGNOSIS — K21.9 GASTROESOPHAGEAL REFLUX DISEASE WITHOUT ESOPHAGITIS: ICD-10-CM

## 2018-11-08 DIAGNOSIS — M25.512 CHRONIC PAIN OF BOTH SHOULDERS: ICD-10-CM

## 2018-11-08 DIAGNOSIS — I48.91 ATRIAL FIBRILLATION WITH RVR (HCC): ICD-10-CM

## 2018-11-08 DIAGNOSIS — M67.911 DISORDER OF RIGHT ROTATOR CUFF: ICD-10-CM

## 2018-11-08 LAB — HBA1C MFR BLD: 6.3 %

## 2018-11-08 PROCEDURE — G8417 CALC BMI ABV UP PARAM F/U: HCPCS | Performed by: INTERNAL MEDICINE

## 2018-11-08 PROCEDURE — 83036 HEMOGLOBIN GLYCOSYLATED A1C: CPT | Performed by: INTERNAL MEDICINE

## 2018-11-08 PROCEDURE — G8598 ASA/ANTIPLAT THER USED: HCPCS | Performed by: INTERNAL MEDICINE

## 2018-11-08 PROCEDURE — 1100F PTFALLS ASSESS-DOCD GE2>/YR: CPT | Performed by: INTERNAL MEDICINE

## 2018-11-08 PROCEDURE — 4040F PNEUMOC VAC/ADMIN/RCVD: CPT | Performed by: INTERNAL MEDICINE

## 2018-11-08 PROCEDURE — 1036F TOBACCO NON-USER: CPT | Performed by: INTERNAL MEDICINE

## 2018-11-08 PROCEDURE — G8482 FLU IMMUNIZE ORDER/ADMIN: HCPCS | Performed by: INTERNAL MEDICINE

## 2018-11-08 PROCEDURE — G8400 PT W/DXA NO RESULTS DOC: HCPCS | Performed by: INTERNAL MEDICINE

## 2018-11-08 PROCEDURE — G8427 DOCREV CUR MEDS BY ELIG CLIN: HCPCS | Performed by: INTERNAL MEDICINE

## 2018-11-08 PROCEDURE — 99214 OFFICE O/P EST MOD 30 MIN: CPT | Performed by: INTERNAL MEDICINE

## 2018-11-08 PROCEDURE — 1090F PRES/ABSN URINE INCON ASSESS: CPT | Performed by: INTERNAL MEDICINE

## 2018-11-08 PROCEDURE — 1123F ACP DISCUSS/DSCN MKR DOCD: CPT | Performed by: INTERNAL MEDICINE

## 2018-11-08 PROCEDURE — 3288F FALL RISK ASSESSMENT DOCD: CPT | Performed by: INTERNAL MEDICINE

## 2018-11-08 RX ORDER — CIPROFLOXACIN 500 MG/1
500 TABLET, FILM COATED ORAL 2 TIMES DAILY
COMMUNITY
End: 2019-02-08 | Stop reason: ALTCHOICE

## 2018-11-08 RX ORDER — LANCETS 30 GAUGE
1 EACH MISCELLANEOUS 2 TIMES DAILY
Qty: 100 EACH | Refills: 5 | Status: SHIPPED | OUTPATIENT
Start: 2018-11-08 | End: 2019-05-09 | Stop reason: SDUPTHER

## 2018-11-08 RX ORDER — GLUCOSAMINE HCL/CHONDROITIN SU 500-400 MG
CAPSULE ORAL
Qty: 50 STRIP | Refills: 5 | Status: SHIPPED | OUTPATIENT
Start: 2018-11-08 | End: 2019-05-09 | Stop reason: SDUPTHER

## 2018-11-08 RX ORDER — DULOXETIN HYDROCHLORIDE 60 MG/1
60 CAPSULE, DELAYED RELEASE ORAL DAILY
Qty: 90 CAPSULE | Refills: 1 | Status: SHIPPED | OUTPATIENT
Start: 2018-11-08 | End: 2019-01-09 | Stop reason: SDUPTHER

## 2018-11-08 RX ORDER — IMIPRAMINE HCL 50 MG
50 TABLET ORAL NIGHTLY
COMMUNITY
End: 2019-03-19 | Stop reason: SINTOL

## 2018-11-08 RX ORDER — RANITIDINE 150 MG/1
150 TABLET ORAL DAILY
Qty: 90 TABLET | Refills: 1 | Status: SHIPPED | OUTPATIENT
Start: 2018-11-08 | End: 2019-02-08 | Stop reason: SDUPTHER

## 2018-11-08 RX ORDER — DILTIAZEM HYDROCHLORIDE 180 MG/1
180 CAPSULE, COATED, EXTENDED RELEASE ORAL DAILY
Qty: 90 CAPSULE | Refills: 1 | Status: SHIPPED | OUTPATIENT
Start: 2018-11-08 | End: 2019-02-08 | Stop reason: SDUPTHER

## 2018-11-08 RX ORDER — TRAMADOL HYDROCHLORIDE 50 MG/1
50 TABLET ORAL EVERY 8 HOURS PRN
Qty: 60 TABLET | Refills: 0 | Status: SHIPPED | OUTPATIENT
Start: 2018-11-08 | End: 2018-12-08

## 2018-11-08 RX ORDER — ATENOLOL 50 MG/1
25 TABLET ORAL 2 TIMES DAILY
Qty: 90 TABLET | Refills: 1 | Status: SHIPPED | OUTPATIENT
Start: 2018-11-08 | End: 2019-04-11 | Stop reason: SDUPTHER

## 2018-11-08 ASSESSMENT — ENCOUNTER SYMPTOMS
RHINORRHEA: 1
EYE DISCHARGE: 0
EYE ITCHING: 0
ABDOMINAL PAIN: 0
SHORTNESS OF BREATH: 0
COUGH: 0
BLURRED VISION: 0
BACK PAIN: 0
NAUSEA: 0
VISUAL CHANGE: 0
SORE THROAT: 0

## 2018-11-08 NOTE — PROGRESS NOTES
breath. Past treatments include beta blockers and calcium channel blockers. The current treatment provides significant improvement. There are no compliance problems. There is no history of kidney disease, CAD/MI, CVA or heart failure. Gastroesophageal Reflux   She reports no abdominal pain, no chest pain, no coughing, no nausea or no sore throat. This is a chronic problem. The current episode started more than 1 year ago. The problem occurs rarely. The problem has been unchanged. The symptoms are aggravated by certain foods. Risk factors include lack of exercise and obesity. She has tried a PPI and a histamine-2 antagonist for the symptoms. The treatment provided significant relief. Past procedures include an EGD. Past invasive treatments do not include gastroplasty. Past Medical History:     Past Medical History:   Diagnosis Date    Anemia     Chicken pox     Chronic back pain     Diabetes mellitus ([address]     Gastroesophageal reflux disease without esophagitis 12/7/2017    Headache     Hypertension     Measles     Mumps     Osteoarthritis     Whooping cough       Reviewed all health maintenance requirements and orderedappropriate tests  Health Maintenance Due   Topic Date Due    DTaP/Tdap/Td vaccine (1 - Tdap) 09/18/1961    Shingles Vaccine (1 of 2 - 2 Dose Series) 09/18/1992       Past Surgical History:     Past Surgical History:   Procedure Laterality Date    CARPAL TUNNEL RELEASE Left 2005    JOINT REPLACEMENT Right 2004    right knee    JOINT REPLACEMENT Left 2005    left knee    JOINT REPLACEMENT Right 2016    right knee    JOINT REPLACEMENT Right 2001    right hip    JOINT REPLACEMENT Left 2006    left hip    TOE SURGERY Left 2006    joint removed from 2nd toe        Medications:       Prior to Admission medications    Medication Sig Start Date End Date Taking?  Authorizing Provider   imipramine (TOFRANIL) 50 MG tablet Take 50 mg by mouth nightly   Yes Historical Provider, MD ciprofloxacin (CIPRO) 500 MG tablet Take 500 mg by mouth 2 times daily   Yes Historical Provider, MD   Calcium Polycarbophil (FIBER-CAPS PO) Take 5 capsules by mouth daily   Yes Historical Provider, MD   atenolol (TENORMIN) 50 MG tablet Take 0.5 tablets by mouth 2 times daily 11/8/18  Yes Sheri Garza MD   diltiazem (CARTIA XT) 180 MG extended release capsule Take 1 capsule by mouth daily 11/8/18  Yes Sheri Garza MD   traMADol (ULTRAM) 50 MG tablet Take 1 tablet by mouth every 8 hours as needed for Pain for up to 30 days. . 11/8/18 12/8/18 Yes Sheri Garza MD   ranitidine (ZANTAC) 150 MG tablet Take 1 tablet by mouth daily 11/8/18  Yes Sheri Garza MD   DULoxetine (CYMBALTA) 60 MG extended release capsule Take 1 capsule by mouth daily 11/8/18  Yes Sheri Garza MD   blood glucose monitor kit and supplies Please dispense on that is covered by her insurance tests daily  DX E11.9 11/8/18  Yes Sheri Garza MD   blood glucose monitor strips Tests daily-DX E11.9  Please dis[ense to go with meter 11/8/18  Yes Sheri Garza MD   Lancets MISC 1 each by Does not apply route 2 times daily Pt test daily  DX E11.9  Please dispense lancets that goes with her meter 11/8/18  Yes Sheri Garza MD   ELIQUIS 5 MG TABS tablet Take 1 tablet by mouth 2 times daily 9/17/18  Yes Sheri Garza MD   fluticasone (FLONASE) 50 MCG/ACT nasal spray 1 spray by Nasal route daily 6/11/18  Yes Sheri Garza MD   meclizine (ANTIVERT) 25 MG tablet Take 1 tablet by mouth 3 times daily as needed for Dizziness 6/11/18  Yes Sheri Garza MD   calcium carbonate 600 MG TABS tablet Take 1 tablet by mouth 2 times daily as needed   Yes Historical Provider, MD   diclofenac sodium 1 % GEL Apply 2 g topically 2 times daily   Yes Historical Provider, MD   vitamin B-12 (CYANOCOBALAMIN) 1000 MCG tablet Take 1,000 mcg by mouth daily   Yes Historical Provider, MD   vitamin D (CHOLECALCIFEROL) 1000 UNIT TABS tablet Take 1,000 Units by mouth 2 times daily   Yes Historical Provider, MD   Glucosamine-Chondroit-Vit C-Mn (GLUCOSAMINE 1500 COMPLEX PO) Take 1 capsule by mouth daily   Yes Historical Provider, MD   docusate sodium (COLACE) 250 MG capsule Take 250 mg by mouth 2 times daily   Yes Historical Provider, MD   Multiple Vitamins-Minerals (THERAPEUTIC MULTIVITAMIN-MINERALS) tablet Take 1 tablet by mouth daily   Yes Historical Provider, MD   pantoprazole (PROTONIX) 40 MG tablet take 1 tablet by mouth once daily 4/30/18   Chrissy Hooks MD        Allergies:       Penicillins; Sulfa antibiotics; and Aspirin    Social History:     Tobacco: reports that she has never smoked. She has never used smokeless tobacco.  Alcohol:      reports that she does not drink alcohol. Drug Use:  reports that she does not use drugs. Family History:     Family History   Problem Relation Age of Onset    High Blood Pressure Mother     Parkinsonism Mother     High Blood Pressure Father     Diabetes Father     Heart Attack Father     Diabetes Sister     High Blood Pressure Sister     Arrhythmia Sister     Diabetes Brother     High Blood Pressure Brother        Review of Systems:         Review of Systems   Constitutional: Negative for activity change, appetite change, chills, fever and unexpected weight change. HENT: Positive for congestion and rhinorrhea. Negative for sore throat. Eyes: Negative for blurred vision, discharge and itching. Respiratory: Negative for cough and shortness of breath. Cardiovascular: Negative for chest pain and palpitations. Gastrointestinal: Negative for abdominal pain and nausea. Genitourinary: Positive for dysuria. Negative for difficulty urinating. Musculoskeletal: Positive for arthralgias (chronic BL shoulder pain). Negative for back pain. Skin: Negative for rash. Neurological: Negative for dizziness, tremors, syncope and headaches.    Psychiatric/Behavioral: Negative for behavioral problems and sleep

## 2019-01-03 ENCOUNTER — HOSPITAL ENCOUNTER (OUTPATIENT)
Age: 79
Discharge: HOME | End: 2019-01-03
Payer: MEDICARE

## 2019-01-03 ENCOUNTER — OFFICE VISIT (OUTPATIENT)
Dept: FAMILY MEDICINE CLINIC | Age: 79
End: 2019-01-03
Payer: MEDICARE

## 2019-01-03 VITALS
DIASTOLIC BLOOD PRESSURE: 72 MMHG | WEIGHT: 224 LBS | HEART RATE: 80 BPM | BODY MASS INDEX: 32.07 KG/M2 | SYSTOLIC BLOOD PRESSURE: 144 MMHG | HEIGHT: 70 IN

## 2019-01-03 DIAGNOSIS — R53.83 OTHER FATIGUE: ICD-10-CM

## 2019-01-03 DIAGNOSIS — E83.52 HYPERCALCEMIA: ICD-10-CM

## 2019-01-03 DIAGNOSIS — E11.9 TYPE 2 DIABETES MELLITUS WITHOUT COMPLICATION, WITHOUT LONG-TERM CURRENT USE OF INSULIN (HCC): Primary | ICD-10-CM

## 2019-01-03 DIAGNOSIS — J01.00 ACUTE MAXILLARY SINUSITIS, RECURRENCE NOT SPECIFIED: Primary | ICD-10-CM
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
## 2019-01-03 DIAGNOSIS — I48.91 ATRIAL FIBRILLATION WITH RVR (HCC): ICD-10-CM

## 2019-01-03 LAB
-: ABNORMAL
ABSOLUTE EOS #: 0.1 K/UL (ref 0–0.4)
ABSOLUTE IMMATURE GRANULOCYTE: ABNORMAL K/UL (ref 0–0.3)
ABSOLUTE LYMPH #: 2.9 K/UL (ref 1–4.8)
ABSOLUTE MONO #: 0.8 K/UL (ref 0–1)
ALBUMIN SERPL-MCNC: 4.4 G/DL (ref 3.5–5.2)
ALBUMIN/GLOBULIN RATIO: ABNORMAL (ref 1–2.5)
ALP BLD-CCNC: 97 U/L (ref 35–104)
ALT SERPL-CCNC: 16 U/L (ref 5–33)
AMORPHOUS: ABNORMAL
ANION GAP SERPL CALCULATED.3IONS-SCNC: 12 MMOL/L (ref 9–17)
AST SERPL-CCNC: 18 U/L
BACTERIA: ABNORMAL
BASOPHILS # BLD: 0 % (ref 0–2)
BASOPHILS ABSOLUTE: 0 K/UL (ref 0–0.2)
BILIRUB SERPL-MCNC: 0.6 MG/DL (ref 0.3–1.2)
BILIRUBIN URINE: NEGATIVE
BUN BLDV-MCNC: 30 MG/DL (ref 8–23)
BUN/CREAT BLD: 45 (ref 9–20)
CALCIUM SERPL-MCNC: 11.1 MG/DL (ref 8.6–10.4)
CASTS UA: ABNORMAL /LPF
CHLORIDE BLD-SCNC: 97 MMOL/L (ref 98–107)
CO2: 26 MMOL/L (ref 20–31)
COLOR: YELLOW
COMMENT UA: ABNORMAL
CREAT SERPL-MCNC: 0.67 MG/DL (ref 0.5–0.9)
CRYSTALS, UA: ABNORMAL /HPF
DIFFERENTIAL TYPE: YES
EOSINOPHILS RELATIVE PERCENT: 2 % (ref 0–5)
EPITHELIAL CELLS UA: ABNORMAL /HPF
GFR AFRICAN AMERICAN: >60 ML/MIN
GFR NON-AFRICAN AMERICAN: >60 ML/MIN
GFR SERPL CREATININE-BSD FRML MDRD: ABNORMAL ML/MIN/{1.73_M2}
GFR SERPL CREATININE-BSD FRML MDRD: ABNORMAL ML/MIN/{1.73_M2}
GLUCOSE BLD-MCNC: 246 MG/DL (ref 70–99)
GLUCOSE URINE: ABNORMAL
HCT VFR BLD CALC: 48 % (ref 36–46)
HEMOGLOBIN: 16.1 G/DL (ref 12–16)
IMMATURE GRANULOCYTES: ABNORMAL %
KETONES, URINE: NEGATIVE
LEUKOCYTE ESTERASE, URINE: ABNORMAL
LYMPHOCYTES # BLD: 32 % (ref 15–40)
MCH RBC QN AUTO: 31.7 PG (ref 26–34)
MCHC RBC AUTO-ENTMCNC: 33.5 G/DL (ref 31–37)
MCV RBC AUTO: 94.5 FL (ref 80–100)
MONOCYTES # BLD: 9 % (ref 4–8)
MUCUS: ABNORMAL
NITRITE, URINE: NEGATIVE
NRBC AUTOMATED: ABNORMAL PER 100 WBC
OTHER OBSERVATIONS UA: ABNORMAL
PDW BLD-RTO: 12.6 % (ref 12.1–15.2)
PH UA: 6 (ref 5–8)
PLATELET # BLD: 244 K/UL (ref 140–450)
PLATELET ESTIMATE: ABNORMAL
PMV BLD AUTO: ABNORMAL FL (ref 6–12)
POTASSIUM SERPL-SCNC: 4.6 MMOL/L (ref 3.7–5.3)
PROTEIN UA: ABNORMAL
RBC # BLD: 5.08 M/UL (ref 4–5.2)
RBC # BLD: ABNORMAL 10*6/UL
RBC UA: ABNORMAL /HPF (ref 0–2)
RENAL EPITHELIAL, UA: ABNORMAL /HPF
SEG NEUTROPHILS: 57 % (ref 47–75)
SEGMENTED NEUTROPHILS ABSOLUTE COUNT: 5.2 K/UL (ref 2.5–7)
SODIUM BLD-SCNC: 135 MMOL/L (ref 135–144)
SPECIFIC GRAVITY UA: 1.01 (ref 1–1.03)
TOTAL PROTEIN: 7.9 G/DL (ref 6.4–8.3)
TRICHOMONAS: ABNORMAL
TSH SERPL DL<=0.05 MIU/L-ACNC: 4.44 MIU/L (ref 0.3–5)
TURBIDITY: CLEAR
URINE HGB: ABNORMAL
UROBILINOGEN, URINE: NORMAL
WBC # BLD: 9.1 K/UL (ref 3.5–11)
WBC # BLD: ABNORMAL 10*3/UL
WBC UA: ABNORMAL /HPF
YEAST: ABNORMAL

## 2019-01-03 PROCEDURE — 3288F FALL RISK ASSESSMENT DOCD: CPT | Performed by: INTERNAL MEDICINE

## 2019-01-03 PROCEDURE — 36415 COLL VENOUS BLD VENIPUNCTURE: CPT

## 2019-01-03 PROCEDURE — 85025 COMPLETE CBC W/AUTO DIFF WBC: CPT

## 2019-01-03 PROCEDURE — G8400 PT W/DXA NO RESULTS DOC: HCPCS | Performed by: INTERNAL MEDICINE

## 2019-01-03 PROCEDURE — 1036F TOBACCO NON-USER: CPT | Performed by: INTERNAL MEDICINE

## 2019-01-03 PROCEDURE — 80053 COMPREHEN METABOLIC PANEL: CPT

## 2019-01-03 PROCEDURE — G8427 DOCREV CUR MEDS BY ELIG CLIN: HCPCS | Performed by: INTERNAL MEDICINE

## 2019-01-03 PROCEDURE — 99214 OFFICE O/P EST MOD 30 MIN: CPT | Performed by: INTERNAL MEDICINE

## 2019-01-03 PROCEDURE — 81001 URINALYSIS AUTO W/SCOPE: CPT

## 2019-01-03 PROCEDURE — 1100F PTFALLS ASSESS-DOCD GE2>/YR: CPT | Performed by: INTERNAL MEDICINE

## 2019-01-03 PROCEDURE — 1123F ACP DISCUSS/DSCN MKR DOCD: CPT | Performed by: INTERNAL MEDICINE

## 2019-01-03 PROCEDURE — 4040F PNEUMOC VAC/ADMIN/RCVD: CPT | Performed by: INTERNAL MEDICINE

## 2019-01-03 PROCEDURE — 1090F PRES/ABSN URINE INCON ASSESS: CPT | Performed by: INTERNAL MEDICINE

## 2019-01-03 PROCEDURE — 84443 ASSAY THYROID STIM HORMONE: CPT

## 2019-01-03 PROCEDURE — G8417 CALC BMI ABV UP PARAM F/U: HCPCS | Performed by: INTERNAL MEDICINE

## 2019-01-03 PROCEDURE — G8482 FLU IMMUNIZE ORDER/ADMIN: HCPCS | Performed by: INTERNAL MEDICINE

## 2019-01-03 PROCEDURE — 0518F FALL PLAN OF CARE DOCD: CPT | Performed by: INTERNAL MEDICINE

## 2019-01-03 RX ORDER — LEVOFLOXACIN 250 MG/1
250 TABLET ORAL DAILY
Qty: 5 TABLET | Refills: 0 | Status: SHIPPED | OUTPATIENT
Start: 2019-01-03 | End: 2019-01-08

## 2019-01-03 ASSESSMENT — ENCOUNTER SYMPTOMS
ABDOMINAL PAIN: 0
SINUS COMPLAINT: 1
VISUAL CHANGE: 0
SHORTNESS OF BREATH: 0
SINUS PRESSURE: 1
CHEST TIGHTNESS: 0
ABDOMINAL DISTENTION: 0
SORE THROAT: 0
EYE REDNESS: 0
CONSTIPATION: 0
TROUBLE SWALLOWING: 0
EYE PAIN: 0
CHOKING: 0
VOMITING: 0
DIARRHEA: 0
NAUSEA: 0
COUGH: 0
WHEEZING: 0

## 2019-01-09 ENCOUNTER — TELEPHONE (OUTPATIENT)
Dept: FAMILY MEDICINE CLINIC | Age: 79
End: 2019-01-09

## 2019-01-09 DIAGNOSIS — F41.9 ANXIETY: ICD-10-CM

## 2019-01-09 RX ORDER — DULOXETIN HYDROCHLORIDE 60 MG/1
60 CAPSULE, DELAYED RELEASE ORAL DAILY
Qty: 90 CAPSULE | Refills: 1 | Status: SHIPPED | OUTPATIENT
Start: 2019-01-09 | End: 2019-02-08 | Stop reason: SDUPTHER

## 2019-01-09 RX ORDER — OMEPRAZOLE 20 MG/1
20 CAPSULE, DELAYED RELEASE ORAL
Qty: 180 CAPSULE | Refills: 1 | Status: SHIPPED | OUTPATIENT
Start: 2019-01-09 | End: 2019-02-08 | Stop reason: SDUPTHER

## 2019-01-10 ENCOUNTER — HOSPITAL ENCOUNTER (OUTPATIENT)
Age: 79
Discharge: HOME | End: 2019-01-10
Payer: MEDICARE

## 2019-01-10 DIAGNOSIS — E11.9 TYPE 2 DIABETES MELLITUS WITHOUT COMPLICATION, WITHOUT LONG-TERM CURRENT USE OF INSULIN (HCC): ICD-10-CM

## 2019-01-10 DIAGNOSIS — E83.52 HYPERCALCEMIA: ICD-10-CM

## 2019-01-10 LAB
ALBUMIN SERPL-MCNC: 4.6 G/DL (ref 3.5–5.2)
ALBUMIN/GLOBULIN RATIO: ABNORMAL (ref 1–2.5)
ALP BLD-CCNC: 88 U/L (ref 35–104)
ALT SERPL-CCNC: 16 U/L (ref 5–33)
ANION GAP SERPL CALCULATED.3IONS-SCNC: 13 MMOL/L (ref 9–17)
AST SERPL-CCNC: 26 U/L
BILIRUB SERPL-MCNC: 1.33 MG/DL (ref 0.3–1.2)
BUN BLDV-MCNC: 16 MG/DL (ref 8–23)
BUN/CREAT BLD: 29 (ref 9–20)
CALCIUM SERPL-MCNC: 10.6 MG/DL (ref 8.6–10.4)
CHLORIDE BLD-SCNC: 96 MMOL/L (ref 98–107)
CO2: 24 MMOL/L (ref 20–31)
CREAT SERPL-MCNC: 0.56 MG/DL (ref 0.5–0.9)
GFR AFRICAN AMERICAN: >60 ML/MIN
GFR NON-AFRICAN AMERICAN: >60 ML/MIN
GFR SERPL CREATININE-BSD FRML MDRD: ABNORMAL ML/MIN/{1.73_M2}
GFR SERPL CREATININE-BSD FRML MDRD: ABNORMAL ML/MIN/{1.73_M2}
GLUCOSE BLD-MCNC: 182 MG/DL (ref 70–99)
POTASSIUM SERPL-SCNC: 4.8 MMOL/L (ref 3.7–5.3)
SODIUM BLD-SCNC: 133 MMOL/L (ref 135–144)
TOTAL PROTEIN: 8 G/DL (ref 6.4–8.3)

## 2019-01-10 PROCEDURE — 82330 ASSAY OF CALCIUM: CPT

## 2019-01-10 PROCEDURE — 83970 ASSAY OF PARATHORMONE: CPT

## 2019-01-10 PROCEDURE — 80053 COMPREHEN METABOLIC PANEL: CPT

## 2019-01-10 PROCEDURE — 36415 COLL VENOUS BLD VENIPUNCTURE: CPT

## 2019-01-11 LAB
CALCIUM IONIZED: 1.32 MMOL/L (ref 1.13–1.33)
PTH INTACT: 18.24 PG/ML (ref 15–65)

## 2019-02-08 ENCOUNTER — OFFICE VISIT (OUTPATIENT)
Dept: FAMILY MEDICINE CLINIC | Age: 79
End: 2019-02-08
Payer: MEDICARE

## 2019-02-08 VITALS
OXYGEN SATURATION: 98 % | SYSTOLIC BLOOD PRESSURE: 138 MMHG | HEART RATE: 90 BPM | WEIGHT: 230 LBS | BODY MASS INDEX: 33 KG/M2 | DIASTOLIC BLOOD PRESSURE: 78 MMHG

## 2019-02-08 DIAGNOSIS — G89.29 CHRONIC PAIN OF BOTH SHOULDERS: Primary | ICD-10-CM

## 2019-02-08 DIAGNOSIS — R29.898 WEAKNESS OF BOTH LEGS: ICD-10-CM

## 2019-02-08 DIAGNOSIS — F41.9 ANXIETY: ICD-10-CM

## 2019-02-08 DIAGNOSIS — M25.511 CHRONIC PAIN OF BOTH SHOULDERS: Primary | ICD-10-CM

## 2019-02-08 DIAGNOSIS — K21.9 GASTROESOPHAGEAL REFLUX DISEASE WITHOUT ESOPHAGITIS: ICD-10-CM

## 2019-02-08 DIAGNOSIS — E11.9 TYPE 2 DIABETES MELLITUS WITHOUT COMPLICATION, WITHOUT LONG-TERM CURRENT USE OF INSULIN (HCC): ICD-10-CM

## 2019-02-08 DIAGNOSIS — M25.512 CHRONIC PAIN OF BOTH SHOULDERS: Primary | ICD-10-CM

## 2019-02-08 DIAGNOSIS — R29.6 FREQUENT FALLS: ICD-10-CM

## 2019-02-08 DIAGNOSIS — I10 ESSENTIAL HYPERTENSION: ICD-10-CM

## 2019-02-08 PROCEDURE — G8482 FLU IMMUNIZE ORDER/ADMIN: HCPCS | Performed by: INTERNAL MEDICINE

## 2019-02-08 PROCEDURE — G8400 PT W/DXA NO RESULTS DOC: HCPCS | Performed by: INTERNAL MEDICINE

## 2019-02-08 PROCEDURE — G8417 CALC BMI ABV UP PARAM F/U: HCPCS | Performed by: INTERNAL MEDICINE

## 2019-02-08 PROCEDURE — 1100F PTFALLS ASSESS-DOCD GE2>/YR: CPT | Performed by: INTERNAL MEDICINE

## 2019-02-08 PROCEDURE — 4040F PNEUMOC VAC/ADMIN/RCVD: CPT | Performed by: INTERNAL MEDICINE

## 2019-02-08 PROCEDURE — 0518F FALL PLAN OF CARE DOCD: CPT | Performed by: INTERNAL MEDICINE

## 2019-02-08 PROCEDURE — G8427 DOCREV CUR MEDS BY ELIG CLIN: HCPCS | Performed by: INTERNAL MEDICINE

## 2019-02-08 PROCEDURE — 1036F TOBACCO NON-USER: CPT | Performed by: INTERNAL MEDICINE

## 2019-02-08 PROCEDURE — 3288F FALL RISK ASSESSMENT DOCD: CPT | Performed by: INTERNAL MEDICINE

## 2019-02-08 PROCEDURE — 1123F ACP DISCUSS/DSCN MKR DOCD: CPT | Performed by: INTERNAL MEDICINE

## 2019-02-08 PROCEDURE — 1090F PRES/ABSN URINE INCON ASSESS: CPT | Performed by: INTERNAL MEDICINE

## 2019-02-08 PROCEDURE — 99214 OFFICE O/P EST MOD 30 MIN: CPT | Performed by: INTERNAL MEDICINE

## 2019-02-08 RX ORDER — DULOXETIN HYDROCHLORIDE 60 MG/1
60 CAPSULE, DELAYED RELEASE ORAL DAILY
Qty: 90 CAPSULE | Refills: 1 | Status: SHIPPED | OUTPATIENT
Start: 2019-02-08 | End: 2019-05-09 | Stop reason: SDUPTHER

## 2019-02-08 RX ORDER — RANITIDINE 150 MG/1
150 TABLET ORAL DAILY
Qty: 90 TABLET | Refills: 1 | Status: SHIPPED | OUTPATIENT
Start: 2019-02-08 | End: 2019-07-29 | Stop reason: SDUPTHER

## 2019-02-08 RX ORDER — ASPIRIN 325 MG
325 TABLET ORAL
COMMUNITY
End: 2021-06-17

## 2019-02-08 RX ORDER — OMEPRAZOLE 20 MG/1
20 CAPSULE, DELAYED RELEASE ORAL
Qty: 180 CAPSULE | Refills: 1 | Status: SHIPPED | OUTPATIENT
Start: 2019-02-08 | End: 2019-07-29 | Stop reason: SDUPTHER

## 2019-02-08 RX ORDER — CETIRIZINE HYDROCHLORIDE 10 MG/1
10 TABLET ORAL DAILY
COMMUNITY
End: 2020-05-27

## 2019-02-08 RX ORDER — ACETAMINOPHEN 500 MG
1000 TABLET ORAL
COMMUNITY

## 2019-02-08 RX ORDER — DILTIAZEM HYDROCHLORIDE 180 MG/1
180 CAPSULE, COATED, EXTENDED RELEASE ORAL DAILY
Qty: 90 CAPSULE | Refills: 1 | Status: SHIPPED | OUTPATIENT
Start: 2019-02-08 | End: 2019-07-29 | Stop reason: SDUPTHER

## 2019-02-10 ASSESSMENT — ENCOUNTER SYMPTOMS
NAUSEA: 0
COUGH: 0
WHEEZING: 0
ABDOMINAL PAIN: 0
SORE THROAT: 0
SHORTNESS OF BREATH: 0
CHEST TIGHTNESS: 0

## 2019-03-15 ENCOUNTER — HOSPITAL ENCOUNTER (OUTPATIENT)
Age: 79
Discharge: HOME | End: 2019-03-15
Payer: MEDICARE

## 2019-03-15 LAB
FOLATE: >20 NG/ML
SEDIMENTATION RATE, ERYTHROCYTE: 8 MM (ref 0–30)
TOTAL CK: 58 U/L (ref 26–192)
TSH SERPL DL<=0.05 MIU/L-ACNC: 3.5 MIU/L (ref 0.3–5)
VITAMIN B-12: >2000 PG/ML (ref 232–1245)

## 2019-03-15 PROCEDURE — 85651 RBC SED RATE NONAUTOMATED: CPT

## 2019-03-15 PROCEDURE — 83519 RIA NONANTIBODY: CPT

## 2019-03-15 PROCEDURE — 84165 PROTEIN E-PHORESIS SERUM: CPT

## 2019-03-15 PROCEDURE — 82607 VITAMIN B-12: CPT

## 2019-03-15 PROCEDURE — 82550 ASSAY OF CK (CPK): CPT

## 2019-03-15 PROCEDURE — 84443 ASSAY THYROID STIM HORMONE: CPT

## 2019-03-15 PROCEDURE — 36415 COLL VENOUS BLD VENIPUNCTURE: CPT

## 2019-03-15 PROCEDURE — 82746 ASSAY OF FOLIC ACID SERUM: CPT

## 2019-03-15 PROCEDURE — 84155 ASSAY OF PROTEIN SERUM: CPT

## 2019-03-15 PROCEDURE — 83516 IMMUNOASSAY NONANTIBODY: CPT

## 2019-03-18 LAB
ACETYLCHOL BLOCK AB: 0 % (ref 0–26)
ACETYLCHOLINE BINDING ANTIBODY: 0 NMOL/L (ref 0–0.4)
ALBUMIN (CALCULATED): 4.6 G/DL (ref 3.2–5.2)
ALBUMIN PERCENT: 66 % (ref 45–65)
ALPHA 1 PERCENT: 2 % (ref 3–6)
ALPHA 2 PERCENT: 11 % (ref 6–13)
ALPHA-1-GLOBULIN: 0.1 G/DL (ref 0.1–0.4)
ALPHA-2-GLOBULIN: 0.7 G/DL (ref 0.5–0.9)
BETA GLOBULIN: 0.7 G/DL (ref 0.5–1.1)
BETA PERCENT: 11 % (ref 11–19)
GAMMA GLOBULIN %: 11 % (ref 9–20)
GAMMA GLOBULIN: 0.7 G/DL (ref 0.5–1.5)
PATHOLOGIST: ABNORMAL
PROTEIN ELECTROPHORESIS, SERUM: ABNORMAL
TOTAL PROT. SUM,%: 101 % (ref 98–102)
TOTAL PROT. SUM: 6.8 G/DL (ref 6.3–8.2)
TOTAL PROTEIN: 6.9 G/DL (ref 6.4–8.3)

## 2019-03-19 ENCOUNTER — OFFICE VISIT (OUTPATIENT)
Dept: FAMILY MEDICINE CLINIC | Age: 79
End: 2019-03-19
Payer: MEDICARE

## 2019-03-19 VITALS
HEART RATE: 78 BPM | BODY MASS INDEX: 33.21 KG/M2 | DIASTOLIC BLOOD PRESSURE: 88 MMHG | OXYGEN SATURATION: 98 % | WEIGHT: 232 LBS | SYSTOLIC BLOOD PRESSURE: 138 MMHG | HEIGHT: 70 IN

## 2019-03-19 DIAGNOSIS — R30.0 DYSURIA: ICD-10-CM

## 2019-03-19 DIAGNOSIS — B37.2 INTERTRIGINOUS CANDIDIASIS: Primary | ICD-10-CM

## 2019-03-19 DIAGNOSIS — R29.6 RECURRENT FALLS WHILE WALKING: ICD-10-CM

## 2019-03-19 LAB
ACETYLCHOL MODUL AB: 5 %
BILIRUBIN, POC: NEGATIVE
BLOOD URINE, POC: NORMAL
CLARITY, POC: NORMAL
COLOR, POC: NORMAL
GLUCOSE URINE, POC: 100
KETONES, POC: NEGATIVE
LEUKOCYTE EST, POC: NEGATIVE
NITRITE, POC: NEGATIVE
PH, POC: 6.5
PROTEIN, POC: 30
SPECIFIC GRAVITY, POC: 1.02
UROBILINOGEN, POC: 0.2

## 2019-03-19 PROCEDURE — 81002 URINALYSIS NONAUTO W/O SCOPE: CPT | Performed by: INTERNAL MEDICINE

## 2019-03-19 PROCEDURE — 3288F FALL RISK ASSESSMENT DOCD: CPT | Performed by: INTERNAL MEDICINE

## 2019-03-19 PROCEDURE — 99214 OFFICE O/P EST MOD 30 MIN: CPT | Performed by: INTERNAL MEDICINE

## 2019-03-19 PROCEDURE — G8427 DOCREV CUR MEDS BY ELIG CLIN: HCPCS | Performed by: INTERNAL MEDICINE

## 2019-03-19 PROCEDURE — G8417 CALC BMI ABV UP PARAM F/U: HCPCS | Performed by: INTERNAL MEDICINE

## 2019-03-19 PROCEDURE — 1100F PTFALLS ASSESS-DOCD GE2>/YR: CPT | Performed by: INTERNAL MEDICINE

## 2019-03-19 PROCEDURE — 1036F TOBACCO NON-USER: CPT | Performed by: INTERNAL MEDICINE

## 2019-03-19 PROCEDURE — 4040F PNEUMOC VAC/ADMIN/RCVD: CPT | Performed by: INTERNAL MEDICINE

## 2019-03-19 PROCEDURE — G8400 PT W/DXA NO RESULTS DOC: HCPCS | Performed by: INTERNAL MEDICINE

## 2019-03-19 PROCEDURE — 0518F FALL PLAN OF CARE DOCD: CPT | Performed by: INTERNAL MEDICINE

## 2019-03-19 PROCEDURE — 1123F ACP DISCUSS/DSCN MKR DOCD: CPT | Performed by: INTERNAL MEDICINE

## 2019-03-19 PROCEDURE — G8482 FLU IMMUNIZE ORDER/ADMIN: HCPCS | Performed by: INTERNAL MEDICINE

## 2019-03-19 PROCEDURE — 1090F PRES/ABSN URINE INCON ASSESS: CPT | Performed by: INTERNAL MEDICINE

## 2019-03-19 RX ORDER — FLUCONAZOLE 100 MG/1
100 TABLET ORAL DAILY
Qty: 14 TABLET | Refills: 0 | Status: SHIPPED | OUTPATIENT
Start: 2019-03-19 | End: 2019-04-02

## 2019-03-19 RX ORDER — CLOTRIMAZOLE 1 %
CREAM (GRAM) TOPICAL
Qty: 113 G | Refills: 3 | Status: SHIPPED | OUTPATIENT
Start: 2019-03-19 | End: 2019-03-26

## 2019-03-19 ASSESSMENT — ENCOUNTER SYMPTOMS
SHORTNESS OF BREATH: 0
SORE THROAT: 0
ABDOMINAL PAIN: 0
COUGH: 0
NAUSEA: 0
BOWEL INCONTINENCE: 0

## 2019-03-19 ASSESSMENT — PATIENT HEALTH QUESTIONNAIRE - PHQ9
SUM OF ALL RESPONSES TO PHQ QUESTIONS 1-9: 0
SUM OF ALL RESPONSES TO PHQ QUESTIONS 1-9: 0
SUM OF ALL RESPONSES TO PHQ9 QUESTIONS 1 & 2: 0
2. FEELING DOWN, DEPRESSED OR HOPELESS: 0
1. LITTLE INTEREST OR PLEASURE IN DOING THINGS: 0

## 2019-03-22 LAB
SEND OUT REPORT: NORMAL
TEST NAME: NORMAL

## 2019-04-02 ENCOUNTER — TELEPHONE (OUTPATIENT)
Dept: FAMILY MEDICINE CLINIC | Age: 79
End: 2019-04-02

## 2019-04-02 NOTE — TELEPHONE ENCOUNTER
I did order home health care for her. Do I need additional orders for RN to visit them? Can you please clarify.   Thanks

## 2019-04-02 NOTE — TELEPHONE ENCOUNTER
Pt's yeast infection under the fold of her stomach and under her breasts is not any better. They've been applying the cream bid and she finished the oral medication today. Sandra Tony is asking if she can get an order for a home health nurse to come in and maybe show her the proper way to clean it.  Please advise    Health Maintenance   Topic Date Due    DTaP/Tdap/Td vaccine (1 - Tdap) 09/18/1961    Shingles Vaccine (1 of 2) 09/18/1992    Potassium monitoring  01/10/2020    Creatinine monitoring  01/10/2020    DEXA (modify frequency per FRAX score)  Completed    Flu vaccine  Completed    Pneumococcal 65+ years Vaccine  Completed             (applicable per patient's age: Cancer Screenings, Depression Screening, Fall Risk Screening, Immunizations)    Hemoglobin A1C (%)   Date Value   11/08/2018 6.3   02/06/2018 6.4     AST (U/L)   Date Value   01/10/2019 26     ALT (U/L)   Date Value   01/10/2019 16     BUN (mg/dL)   Date Value   01/10/2019 16      (goal A1C is < 7)   (goal LDL is <100) need 30-50% reduction from baseline     BP Readings from Last 3 Encounters:   03/19/19 138/88   02/08/19 138/78   01/03/19 (!) 144/72    (goal /80)      All Future Testing planned in [facility]:  Lab Frequency Next Occurrence   CBC Auto Differential Once 04/27/2019       Next Visit Date:  Future Appointments   Date Time Provider Brandi Arias   5/9/2019  1:30 PM Veronica Nieto, Gerem[facility] Hicks            Patient Active Problem List:     Essential hypertension     Gastroesophageal reflux disease without esophagitis     Type 2 diabetes mellitus without complication (HCC)     Chronic pain of both shoulders     Anxiety     Atrial fibrillation with RVR (HCC)     Nonexudative age-related macular degeneration, bilateral, early dry stage

## 2019-04-03 NOTE — TELEPHONE ENCOUNTER
Spoke with home health and gave them more clarity that we only need the nurse to help give better cleaning instructions for pt's yeast infection. No order needed.

## 2019-04-08 ENCOUNTER — TELEPHONE (OUTPATIENT)
Dept: FAMILY MEDICINE CLINIC | Age: 79
End: 2019-04-08

## 2019-04-08 NOTE — TELEPHONE ENCOUNTER
Patient called and states her sugar is too high  And is taking metformin. She wants to know if she needs to come see you or if you spoke to her daughter. She said she has not been eating sugar.      Health Maintenance   Topic Date Due    DTaP/Tdap/Td vaccine (1 - Tdap) 09/18/1961    Shingles Vaccine (1 of 2) 09/18/1992    Potassium monitoring  01/10/2020    Creatinine monitoring  01/10/2020    DEXA (modify frequency per FRAX score)  Completed    Flu vaccine  Completed    Pneumococcal 65+ years Vaccine  Completed             (applicable per patient's age: Cancer Screenings, Depression Screening, Fall Risk Screening, Immunizations)    Hemoglobin A1C (%)   Date Value   11/08/2018 6.3   02/06/2018 6.4     AST (U/L)   Date Value   01/10/2019 26     ALT (U/L)   Date Value   01/10/2019 16     BUN (mg/dL)   Date Value   01/10/2019 16      (goal A1C is < 7)   (goal LDL is <100) need 30-50% reduction from baseline     BP Readings from Last 3 Encounters:   03/19/19 138/88   02/08/19 138/78   01/03/19 (!) 144/72    (goal /80)      All Future Testing planned in [facility]:  Lab Frequency Next Occurrence   CBC Auto Differential Once 04/27/2019       Next Visit Date:  Future Appointments   Date Time Provider Brandi Arias   5/9/2019  1:30 PM Alo Tolentino, [address] [place] [address] [place]            Patient Active Problem List:     Essential hypertension     Gastroesophageal reflux disease without esophagitis     Type 2 diabetes mellitus without complication (HCC)     Chronic pain of both shoulders     Anxiety     Atrial fibrillation with RVR (HCC)     Nonexudative age-related macular degeneration, bilateral, early dry stage

## 2019-04-09 RX ORDER — CLOTRIMAZOLE AND BETAMETHASONE DIPROPIONATE 10; .64 MG/G; MG/G
CREAM TOPICAL
Qty: 45 G | Refills: 1 | Status: SHIPPED | OUTPATIENT
Start: 2019-04-09 | End: 2019-08-08

## 2019-04-09 NOTE — TELEPHONE ENCOUNTER
Also says that she still has the rash on her stomach, still red and blistered, is wondering if she needs another round of medication.

## 2019-04-11 DIAGNOSIS — I10 ESSENTIAL HYPERTENSION: ICD-10-CM

## 2019-04-12 ENCOUNTER — TELEPHONE (OUTPATIENT)
Dept: FAMILY MEDICINE CLINIC | Age: 79
End: 2019-04-12

## 2019-04-12 RX ORDER — ATENOLOL 50 MG/1
TABLET ORAL
Qty: 90 TABLET | Refills: 0 | Status: SHIPPED | OUTPATIENT
Start: 2019-04-12 | End: 2019-04-12 | Stop reason: DRUGHIGH

## 2019-04-12 RX ORDER — ATENOLOL 25 MG/1
25 TABLET ORAL 2 TIMES DAILY
Qty: 180 TABLET | Refills: 1 | Status: SHIPPED | OUTPATIENT
Start: 2019-04-12 | End: 2019-06-25 | Stop reason: SDUPTHER

## 2019-04-16 ENCOUNTER — TELEPHONE (OUTPATIENT)
Dept: FAMILY MEDICINE CLINIC | Age: 79
End: 2019-04-16

## 2019-04-18 DIAGNOSIS — E11.9 TYPE 2 DIABETES MELLITUS WITHOUT COMPLICATION, WITHOUT LONG-TERM CURRENT USE OF INSULIN (HCC): ICD-10-CM

## 2019-05-09 ENCOUNTER — OFFICE VISIT (OUTPATIENT)
Dept: FAMILY MEDICINE CLINIC | Age: 79
End: 2019-05-09
Payer: MEDICARE

## 2019-05-09 VITALS
HEART RATE: 92 BPM | SYSTOLIC BLOOD PRESSURE: 144 MMHG | OXYGEN SATURATION: 98 % | BODY MASS INDEX: 32.21 KG/M2 | HEIGHT: 70 IN | DIASTOLIC BLOOD PRESSURE: 88 MMHG | WEIGHT: 225 LBS

## 2019-05-09 DIAGNOSIS — E11.9 TYPE 2 DIABETES MELLITUS WITHOUT COMPLICATION, WITHOUT LONG-TERM CURRENT USE OF INSULIN (HCC): ICD-10-CM

## 2019-05-09 DIAGNOSIS — I10 ESSENTIAL HYPERTENSION: Primary | ICD-10-CM

## 2019-05-09 DIAGNOSIS — F41.9 ANXIETY: ICD-10-CM

## 2019-05-09 DIAGNOSIS — L98.9 SKIN LESION: ICD-10-CM

## 2019-05-09 PROCEDURE — 4040F PNEUMOC VAC/ADMIN/RCVD: CPT | Performed by: INTERNAL MEDICINE

## 2019-05-09 PROCEDURE — 3288F FALL RISK ASSESSMENT DOCD: CPT | Performed by: INTERNAL MEDICINE

## 2019-05-09 PROCEDURE — 1123F ACP DISCUSS/DSCN MKR DOCD: CPT | Performed by: INTERNAL MEDICINE

## 2019-05-09 PROCEDURE — G8427 DOCREV CUR MEDS BY ELIG CLIN: HCPCS | Performed by: INTERNAL MEDICINE

## 2019-05-09 PROCEDURE — 99214 OFFICE O/P EST MOD 30 MIN: CPT | Performed by: INTERNAL MEDICINE

## 2019-05-09 PROCEDURE — 1090F PRES/ABSN URINE INCON ASSESS: CPT | Performed by: INTERNAL MEDICINE

## 2019-05-09 PROCEDURE — 0518F FALL PLAN OF CARE DOCD: CPT | Performed by: INTERNAL MEDICINE

## 2019-05-09 PROCEDURE — G8417 CALC BMI ABV UP PARAM F/U: HCPCS | Performed by: INTERNAL MEDICINE

## 2019-05-09 PROCEDURE — 1036F TOBACCO NON-USER: CPT | Performed by: INTERNAL MEDICINE

## 2019-05-09 PROCEDURE — G8400 PT W/DXA NO RESULTS DOC: HCPCS | Performed by: INTERNAL MEDICINE

## 2019-05-09 RX ORDER — LANCETS 30 GAUGE
1 EACH MISCELLANEOUS 2 TIMES DAILY
Qty: 100 EACH | Refills: 5 | Status: SHIPPED | OUTPATIENT
Start: 2019-05-09 | End: 2019-07-19 | Stop reason: SDUPTHER

## 2019-05-09 RX ORDER — ATENOLOL 50 MG/1
50 TABLET ORAL DAILY
Qty: 60 TABLET | Refills: 3 | Status: SHIPPED | OUTPATIENT
Start: 2019-05-09 | End: 2019-06-26 | Stop reason: SDUPTHER

## 2019-05-09 RX ORDER — DULOXETIN HYDROCHLORIDE 30 MG/1
30 CAPSULE, DELAYED RELEASE ORAL NIGHTLY
Qty: 30 CAPSULE | Refills: 3 | Status: SHIPPED | OUTPATIENT
Start: 2019-05-09 | End: 2019-07-29 | Stop reason: SDUPTHER

## 2019-05-09 RX ORDER — GLIMEPIRIDE 1 MG/1
1 TABLET ORAL
Qty: 30 TABLET | Refills: 3 | Status: SHIPPED | OUTPATIENT
Start: 2019-05-09 | End: 2019-06-18 | Stop reason: SDUPTHER

## 2019-05-09 RX ORDER — GLUCOSAMINE HCL/CHONDROITIN SU 500-400 MG
CAPSULE ORAL
Qty: 50 STRIP | Refills: 5 | Status: SHIPPED | OUTPATIENT
Start: 2019-05-09 | End: 2019-06-06 | Stop reason: SDUPTHER

## 2019-05-09 RX ORDER — GLUCOSAMINE HCL/CHONDROITIN SU 500-400 MG
CAPSULE ORAL
Qty: 50 STRIP | Refills: 5 | Status: SHIPPED | OUTPATIENT
Start: 2019-05-09 | End: 2019-05-09 | Stop reason: SDUPTHER

## 2019-05-09 RX ORDER — DULOXETIN HYDROCHLORIDE 60 MG/1
60 CAPSULE, DELAYED RELEASE ORAL DAILY
Qty: 90 CAPSULE | Refills: 1 | Status: SHIPPED | OUTPATIENT
Start: 2019-05-09 | End: 2019-11-07 | Stop reason: SDUPTHER

## 2019-05-09 ASSESSMENT — ENCOUNTER SYMPTOMS
COUGH: 0
BLURRED VISION: 0
ABDOMINAL PAIN: 0
NAUSEA: 0
SORE THROAT: 0
SHORTNESS OF BREATH: 0

## 2019-05-09 NOTE — PROGRESS NOTES
HPI Notes    Name: Doyle Galloway  : 1940         Chief Complaint:     Chief Complaint   Patient presents with    Hypertension    Diabetes     Last A1C was 6.3 on 18    Anxiety    Mole     has mole on Lt collar bone       History of Present Illness:        Micheline Jorge presents to  office to follow-up for diabetes, hypertension and anxiety. She is accompanied by her daughter    She also wants a skin lesion above the left collar bone to be checked out and \" taken care of\". States had it for many years, but now it is getting bigger and changed color from tan to brown. Had no pain, no bleeding form the site. Says it feels rough on the top. Micheline Jorge presents as a follow up on anxiety. Current medication for anxiety includes Cymbaltha . Micheline Jorge has been on this medication for 2 years. The medication is  being tolerated well. Since starting the medication the symptoms of anxiety have significantly improved. Micheline Jorge  is not in counciling. Daughter reports the patient has more anxiety at nighttime than before. She becomes hernandez, angry. She is asking to try Cymbalta extra dose at bedtime    Having more pain in the shoulder. Tylenol not helping. Using Diclofenac topical gel. Going to see Dr. Felipe Hughes for another injection Says previous one helped a lot but it made glucose levels go up to 200s. Diabetes   She presents for her follow-up diabetic visit. She has type 2 diabetes mellitus. Her disease course has been worsening. Pertinent negatives for hypoglycemia include no confusion, dizziness, headaches, nervousness/anxiousness, seizures or tremors. Associated symptoms include weakness (in legs). Pertinent negatives for diabetes include no blurred vision and no chest pain. There are no hypoglycemic complications. Pertinent negatives for hypoglycemia complications include no hospitalization. Symptoms are stable.  Pertinent negatives for diabetic complications include no autonomic neuropathy, CVA or heart disease. Risk factors for coronary artery disease include diabetes mellitus, dyslipidemia, sedentary lifestyle, post-menopausal and obesity. Current diabetic treatment includes oral agent (monotherapy). She is compliant with treatment all of the time. She is following a generally healthy diet. Meal planning includes avoidance of concentrated sweets. Her breakfast blood glucose range is generally >200 mg/dl. Her dinner blood glucose range is generally 140-180 mg/dl. An ACE inhibitor/angiotensin II receptor blocker is not being taken (allergic to Lisinopril). Hypertension   This is a chronic problem. The current episode started more than 1 year ago. The problem has been gradually worsening since onset. The problem is uncontrolled. Pertinent negatives include no blurred vision, chest pain, headaches, palpitations or shortness of breath. There are no associated agents to hypertension. Past treatments include beta blockers. The current treatment provides moderate improvement. There are no compliance problems. There is no history of kidney disease, CAD/MI or CVA.            Past Medical History:     Past Medical History:   Diagnosis Date    Anemia     Chicken pox     Chronic back pain     [facility] mellitus ([facility] [address]     Gastroesophageal reflux disease without esophagitis 12/7/2017    Headache     Hypertension     Measles     Mumps     Osteoarthritis     Whooping cough       Reviewed all health maintenance requirements and orderedappropriate tests  Health Maintenance Due   Topic Date Due    DTaP/Tdap/Td vaccine (1 - Tdap) 09/18/1961    Shingles Vaccine (1 of 2) 09/18/1992       Past Surgical History:     Past Surgical History:   Procedure Laterality Date    CARPAL TUNNEL RELEASE Left 2005    JOINT REPLACEMENT Right 2004    right knee    JOINT REPLACEMENT Left 2005    left knee    JOINT REPLACEMENT Right 2016    right knee    JOINT REPLACEMENT Right 2001    right hip    JOINT REPLACEMENT Left 2006 mouth   Yes Historical Provider, MD   Calcium Polycarbophil (FIBER-CAPS PO) Take 5 capsules by mouth daily   Yes Historical Provider, MD   blood glucose monitor kit and supplies Please dispense on that is covered by her insurance tests daily  DX E11.9 11/8/18  Yes Darian Lewis MD   fluticasone (FLONASE) 50 MCG/ACT nasal spray 1 spray by Nasal route daily 6/11/18  Yes Darian Lewis MD   meclizine (ANTIVERT) 25 MG tablet Take 1 tablet by mouth 3 times daily as needed for Dizziness 6/11/18  Yes Darian Lewis MD   calcium carbonate 600 MG TABS tablet Take 1 tablet by mouth 2 times daily as needed   Yes Historical Provider, MD   vitamin B-12 (CYANOCOBALAMIN) 1000 MCG tablet Take 1,000 mcg by mouth daily   Yes Historical Provider, MD   vitamin D (CHOLECALCIFEROL) 1000 UNIT TABS tablet Take 1,000 Units by mouth 2 times daily   Yes Historical Provider, MD   Glucosamine-Chondroit-Vit C-Mn (GLUCOSAMINE 1500 COMPLEX PO) Take 1 capsule by mouth daily   Yes Historical Provider, MD   docusate sodium (COLACE) 250 MG capsule Take 250 mg by mouth 2 times daily   Yes Historical Provider, MD   Multiple Vitamins-Minerals (THERAPEUTIC MULTIVITAMIN-MINERALS) tablet Take 1 tablet by mouth daily   Yes Historical Provider, MD        Allergies:       Penicillins; Sulfa antibiotics; and Aspirin    Social History:     Tobacco: reports that she has never smoked. She has never used smokeless tobacco.  Alcohol:      reports that she does not drink alcohol. Drug Use:  reports that she does not use drugs.     Family History:     Family History   Problem Relation Age of Onset    High Blood Pressure Mother     Parkinsonism Mother     High Blood Pressure Father     Diabetes Father     Heart Attack Father     Diabetes Sister     High Blood Pressure Sister     Arrhythmia Sister     Diabetes Brother     High Blood Pressure Brother        Review of Systems:         Review of Systems   Constitutional: Negative for activity change, appetite change, chills and fever. HENT: Negative for congestion and sore throat. Eyes: Negative for blurred vision. Respiratory: Negative for cough and shortness of breath. Cardiovascular: Negative for chest pain and palpitations. Gastrointestinal: Negative for abdominal pain and nausea. Genitourinary: Negative for dysuria. Musculoskeletal: Positive for arthralgias (shoulder pain) and gait problem (unable to ambulate). Skin: Negative for rash. Neurological: Positive for weakness (in legs). Negative for dizziness, tremors, seizures and headaches. Psychiatric/Behavioral: Negative for confusion. The patient is not nervous/anxious. Physical Exam:     Vitals:  BP (!) 144/88 (Site: Right Upper Arm, Position: Sitting, Cuff Size: Medium Adult)   Pulse 92   Ht 5' 10\" (1.778 m)   Wt 225 lb (102.1 kg)   SpO2 98%   BMI 32.28 kg/m²       Physical Exam   Constitutional: She is oriented to person, place, and time. She appears well-developed and well-nourished. No distress. HENT:   Head: Normocephalic and atraumatic. Mouth/Throat: Oropharynx is clear and moist. No oropharyngeal exudate. Eyes: Conjunctivae are normal. Right eye exhibits no discharge. Left eye exhibits no discharge. No scleral icterus. Neck: No thyromegaly present. Cardiovascular: Normal rate, regular rhythm and normal heart sounds. No murmur heard. Pulmonary/Chest: Effort normal and breath sounds normal. She has no wheezes. She has no rales. Abdominal: Soft. Bowel sounds are normal. She exhibits no distension and no mass. There is no tenderness. Musculoskeletal: Normal range of motion. She exhibits no edema or deformity. Lymphadenopathy:     She has no cervical adenopathy. Neurological: She is alert and oriented to person, place, and time. No cranial nerve deficit. Coordination abnormal.   Skin: Skin is warm and dry. No rash noted. A small brownish lesion above the collar bone on the left.  Mobile, nontender, smooth surface   Psychiatric: She has a normal mood and affect. Her behavior is normal. Judgment normal.   Vitals reviewed. Data:     Lab Results   Component Value Date     01/10/2019    K 4.8 01/10/2019    CL 96 01/10/2019    CO2 24 01/10/2019    BUN 16 01/10/2019    CREATININE 0.56 01/10/2019    CREATININE 0.7 09/08/2017    GLUCOSE 182 01/10/2019    PROT 6.9 03/15/2019    LABALBU 4.6 01/10/2019    BILITOT 1.33 01/10/2019    ALKPHOS 88 01/10/2019    AST 26 01/10/2019    ALT 16 01/10/2019     Lab Results   Component Value Date    WBC 9.1 01/03/2019    RBC 5.08 01/03/2019    HGB 16.1 01/03/2019    HCT 48.0 01/03/2019    MCV 94.5 01/03/2019    MCH 31.7 01/03/2019    MCHC 33.5 01/03/2019    RDW 12.6 01/03/2019     01/03/2019    MPV NOT REPORTED 01/03/2019     Lab Results   Component Value Date    TSH 3.50 03/15/2019     Lab Results   Component Value Date    LABA1C 6.3 11/08/2018          Assessment & Plan        Diagnosis Orders   1. Essential hypertension   BP elevated. Will increase Atenololm to 50 mg bid    2. Anxiety   Add Cymbalta 30 mg qhs for better anxiety control DULoxetine (CYMBALTA) 60 MG extended release capsule    DULoxetine (CYMBALTA) 30 MG extended release capsule   3. Type 2 diabetes mellitus without complication, without long-term current use of insulin ([facility])  Add Glimepiride due to worsening control. Follow up in 3 mo  glimepiride (AMARYL) 1 MG tablet    Lancets MISC    blood glucose monitor strips    DISCONTINUED: blood glucose monitor strips   4.  Skin lesion   Refer to dermatology External Referral To Dermatology                   Completed Refills   Requested Prescriptions     Signed Prescriptions Disp Refills    DULoxetine (CYMBALTA) 60 MG extended release capsule 90 capsule 1     Sig: Take 1 capsule by mouth daily    diclofenac sodium 1 % GEL 1 Tube 3     Sig: Apply 2 g topically 2 times daily    glimepiride (AMARYL) 1 MG tablet 30 tablet 3     Sig: Take 1 tablet by mouth every morning (before breakfast)    atenolol (TENORMIN) 50 MG tablet 60 tablet 3     Sig: Take 1 tablet by mouth daily    DULoxetine (CYMBALTA) 30 MG extended release capsule 30 capsule 3     Sig: Take 1 capsule by mouth nightly    Lancets MISC 100 each 5     Si each by Does not apply route 2 times daily Pt test daily  DX E11.9  Please dispense lancets that goes with her meter    blood glucose monitor strips 50 strip 5     Sig: Tests daily-DX E11.9  Please dis[ense to go with meter     Return in about 3 months (around 2019) for HTN, hyperlipidemia, anxiety. Orders Placed This Encounter   Medications    DULoxetine (CYMBALTA) 60 MG extended release capsule     Sig: Take 1 capsule by mouth daily     Dispense:  90 capsule     Refill:  1    diclofenac sodium 1 % GEL     Sig: Apply 2 g topically 2 times daily     Dispense:  1 Tube     Refill:  3    DISCONTD: blood glucose monitor strips     Sig: Tests daily-DX E11.9  Please dis[ense to go with meter     Dispense:  50 strip     Refill:  5     Brand per patient preference. May round up to next available package size.  glimepiride (AMARYL) 1 MG tablet     Sig: Take 1 tablet by mouth every morning (before breakfast)     Dispense:  30 tablet     Refill:  3    atenolol (TENORMIN) 50 MG tablet     Sig: Take 1 tablet by mouth daily     Dispense:  60 tablet     Refill:  3    DULoxetine (CYMBALTA) 30 MG extended release capsule     Sig: Take 1 capsule by mouth nightly     Dispense:  30 capsule     Refill:  3    Lancets MISC     Si each by Does not apply route 2 times daily Pt test daily  DX E11.9  Please dispense lancets that goes with her meter     Dispense:  100 each     Refill:  5    blood glucose monitor strips     Sig: Tests daily-DX E11.9  Please dis[ense to go with meter     Dispense:  50 strip     Refill:  5     Brand per patient preference. May round up to next available package size.      Orders Placed This Encounter Procedures    External Referral To Dermatology     Referral Priority:   Routine     Referral Type:   Eval and Treat     Referral Reason:   Specialty Services Required     Referred to Provider:   Marino Haynes     Requested Specialty:   Dermatology     Number of Visits Requested:   1         Patient Instructions     SURVEY:    You may be receiving a survey from [email or web address] regarding your visit today. Please complete the survey to enable us to provide the highest quality of care to you and your family. If you cannot score us a very good on any question, please call the office to discuss how we could of made your experience a very good one. Thank you.         Electronically signed by Bev Calvert MD on 2019 at 2:44 PM           Completed Refills      Requested Prescriptions     Signed Prescriptions Disp Refills    DULoxetine (CYMBALTA) 60 MG extended release capsule 90 capsule 1     Sig: Take 1 capsule by mouth daily    diclofenac sodium 1 % GEL 1 Tube 3     Sig: Apply 2 g topically 2 times daily    glimepiride (AMARYL) 1 MG tablet 30 tablet 3     Sig: Take 1 tablet by mouth every morning (before breakfast)    atenolol (TENORMIN) 50 MG tablet 60 tablet 3     Sig: Take 1 tablet by mouth daily    DULoxetine (CYMBALTA) 30 MG extended release capsule 30 capsule 3     Sig: Take 1 capsule by mouth nightly    Lancets MISC 100 each 5     Si each by Does not apply route 2 times daily Pt test daily  DX E11.9  Please dispense lancets that goes with her meter    blood glucose monitor strips 50 strip 5     Sig: Tests daily-DX E11.9  Please dis[ense to go with meter

## 2019-05-09 NOTE — PATIENT INSTRUCTIONS
SURVEY:    You may be receiving a survey from [employer] regarding your visit today. Please complete the survey to enable us to provide the highest quality of care to you and your family. If you cannot score us a very good on any question, please call the office to discuss how we could of made your experience a very good one. Thank you.

## 2019-05-13 ENCOUNTER — TELEPHONE (OUTPATIENT)
Dept: FAMILY MEDICINE CLINIC | Age: 79
End: 2019-05-13

## 2019-05-13 RX ORDER — BLOOD-GLUCOSE METER
1 EACH MISCELLANEOUS DAILY
Qty: 1 KIT | Refills: 0 | Status: ON HOLD | OUTPATIENT
Start: 2019-05-13 | End: 2020-07-18 | Stop reason: HOSPADM

## 2019-05-13 NOTE — TELEPHONE ENCOUNTER
Please send Rx over for One Touch Meter    [facility] Maintenance   Topic Date Due    DTaP/Tdap/Td vaccine (1 - Tdap) 09/18/1961    Shingles Vaccine (1 of 2) 09/18/1992    Potassium monitoring  01/10/2020    Creatinine monitoring  01/10/2020    DEXA (modify frequency per FRAX score)  Completed    Flu vaccine  Completed    Pneumococcal 65+ years Vaccine  Completed             (applicable per patient's age: Cancer Screenings, Depression Screening, Fall Risk Screening, Immunizations)    Hemoglobin A1C (%)   Date Value   11/08/2018 6.3   02/06/2018 6.4     AST (U/L)   Date Value   01/10/2019 26     ALT (U/L)   Date Value   01/10/2019 16     BUN (mg/dL)   Date Value   01/10/2019 16      (goal A1C is < 7)   (goal LDL is <100) need 30-50% reduction from baseline     BP Readings from Last 3 Encounters:   05/09/19 (!) 144/88   03/19/19 138/88   02/08/19 138/78    (goal /80)      All Future Testing planned in [facility]:      Next Visit Date:  Future Appointments   Date Time Provider Brandi Arias   8/8/2019  2:30 PM Angy Carty MD [facility] [address]            Patient Active Problem List:     Essential hypertension     Gastroesophageal reflux disease without esophagitis     Type 2 diabetes mellitus without complication (HCC)     Chronic pain of both shoulders     Anxiety     Atrial fibrillation with RVR (HCC)     Nonexudative age-related macular degeneration, bilateral, early dry stage

## 2019-06-06 DIAGNOSIS — E11.9 TYPE 2 DIABETES MELLITUS WITHOUT COMPLICATION, WITHOUT LONG-TERM CURRENT USE OF INSULIN (HCC): ICD-10-CM

## 2019-06-06 RX ORDER — GLUCOSAMINE HCL/CHONDROITIN SU 500-400 MG
CAPSULE ORAL
Qty: 100 STRIP | Refills: 5 | Status: SHIPPED | OUTPATIENT
Start: 2019-06-06 | End: 2019-07-19 | Stop reason: SDUPTHER

## 2019-06-06 NOTE — TELEPHONE ENCOUNTER
Pt would like to change from testing sugars from qd to bid.  Could you please send in new Rx for One touch ultra test strips bid to [facility] Maintenance   Topic Date Due    DTaP/Tdap/Td vaccine (1 - Tdap) 09/18/1961    Shingles Vaccine (1 of 2) 09/18/1992    Potassium monitoring  01/10/2020    Creatinine monitoring  01/10/2020    DEXA (modify frequency per FRAX score)  Completed    Flu vaccine  Completed    Pneumococcal 65+ years Vaccine  Completed             (applicable per patient's age: Cancer Screenings, Depression Screening, Fall Risk Screening, Immunizations)    Hemoglobin A1C (%)   Date Value   11/08/2018 6.3   02/06/2018 6.4     AST (U/L)   Date Value   01/10/2019 26     ALT (U/L)   Date Value   01/10/2019 16     BUN (mg/dL)   Date Value   01/10/2019 16      (goal A1C is < 7)   (goal LDL is <100) need 30-50% reduction from baseline     BP Readings from Last 3 Encounters:   05/09/19 (!) 144/88   03/19/19 138/88   02/08/19 138/78    (goal /80)      All Future Testing planned in [facility]:      Next Visit Date:  Future Appointments   Date Time Provider Brandi Arias   8/8/2019  2:30 PM Michael Briscoe MD [address] [place] [address]            Patient Active Problem List:     Essential hypertension     Gastroesophageal reflux disease without esophagitis     Type 2 diabetes mellitus without complication (HCC)     Chronic pain of both shoulders     Anxiety     Atrial fibrillation with RVR (HCC)     Nonexudative age-related macular degeneration, bilateral, early dry stage

## 2019-06-18 ENCOUNTER — TELEPHONE (OUTPATIENT)
Dept: FAMILY MEDICINE CLINIC | Age: 79
End: 2019-06-18

## 2019-06-18 DIAGNOSIS — E11.9 TYPE 2 DIABETES MELLITUS WITHOUT COMPLICATION, WITHOUT LONG-TERM CURRENT USE OF INSULIN (HCC): ICD-10-CM

## 2019-06-18 RX ORDER — GLIMEPIRIDE 1 MG/1
1 TABLET ORAL 2 TIMES DAILY
Qty: 60 TABLET | Refills: 3 | Status: SHIPPED | OUTPATIENT
Start: 2019-06-18 | End: 2019-07-29 | Stop reason: SDUPTHER

## 2019-06-18 NOTE — TELEPHONE ENCOUNTER
Pt's sugar is still running high in the morning. 160's 170's and some 200's. She takes Metformin 500 at night and Glimepiride in the am. Would you like her to change this?     Health Maintenance   Topic Date Due    DTaP/Tdap/Td vaccine (1 - Tdap) 09/18/1961    Shingles Vaccine (1 of 2) 09/18/1992    Potassium monitoring  01/10/2020    Creatinine monitoring  01/10/2020    DEXA (modify frequency per FRAX score)  Completed    Flu vaccine  Completed    Pneumococcal 65+ years Vaccine  Completed             (applicable per patient's age: Cancer Screenings, Depression Screening, Fall Risk Screening, Immunizations)    Hemoglobin A1C (%)   Date Value   11/08/2018 6.3   02/06/2018 6.4     AST (U/L)   Date Value   01/10/2019 26     ALT (U/L)   Date Value   01/10/2019 16     BUN (mg/dL)   Date Value   01/10/2019 16      (goal A1C is < 7)   (goal LDL is <100) need 30-50% reduction from baseline     BP Readings from Last 3 Encounters:   05/09/19 (!) 144/88   03/19/19 138/88   02/08/19 138/78    (goal /80)      All Future Testing planned in [facility]:      Next Visit Date:  Future Appointments   Date Time Provider Brandi Arias   8/8/2019  2:30 PM Gabriella Nina MD [facility] [facility] [address]            Patient Active Problem List:     Essential hypertension     Gastroesophageal reflux disease without esophagitis     Type 2 diabetes mellitus without complication (HCC)     Chronic pain of both shoulders     Anxiety     Atrial fibrillation with RVR (HCC)     Nonexudative age-related macular degeneration, bilateral, early dry stage

## 2019-06-25 ENCOUNTER — TELEPHONE (OUTPATIENT)
Dept: FAMILY MEDICINE CLINIC | Age: 79
End: 2019-06-25

## 2019-06-25 RX ORDER — ATENOLOL 25 MG/1
25 TABLET ORAL 2 TIMES DAILY
Qty: 180 TABLET | Refills: 1 | Status: SHIPPED | OUTPATIENT
Start: 2019-06-25 | End: 2019-08-08

## 2019-06-25 NOTE — TELEPHONE ENCOUNTER
Rx refill request    [facility]    Atenolol 50 mg 1 tab bid    Last seen 5/9/19 and increased to 50 mg bid   Last Rx was written as qd    Health Maintenance   Topic Date Due    DTaP/Tdap/Td vaccine (1 - Tdap) 09/18/1961    Shingles Vaccine (1 of 2) 09/18/1992    Annual Wellness Visit (AWV)  09/18/2005    Potassium monitoring  01/10/2020    Creatinine monitoring  01/10/2020    DEXA (modify frequency per FRAX score)  Completed    Flu vaccine  Completed    Pneumococcal 65+ years Vaccine  Completed             (applicable per patient's age: Cancer Screenings, Depression Screening, Fall Risk Screening, Immunizations)    Hemoglobin A1C (%)   Date Value   11/08/2018 6.3   02/06/2018 6.4     AST (U/L)   Date Value   01/10/2019 26     ALT (U/L)   Date Value   01/10/2019 16     BUN (mg/dL)   Date Value   01/10/2019 16      (goal A1C is < 7)   (goal LDL is <100) need 30-50% reduction from baseline     BP Readings from Last 3 Encounters:   05/09/19 (!) 144/88   03/19/19 138/88   02/08/19 138/78    (goal /80)      All Future Testing planned in [facility]:      Next Visit Date:  Future Appointments   Date Time Provider Brandi Arias   8/8/2019  2:30 PM Jan Irving MD [facility]            Patient Active Problem List:     Essential hypertension     Gastroesophageal reflux disease without esophagitis     Type 2 diabetes mellitus without complication (HCC)     Chronic pain of both shoulders     Anxiety     Atrial fibrillation with RVR (HCC)     Nonexudative age-related macular degeneration, bilateral, early dry stage

## 2019-06-26 RX ORDER — ATENOLOL 50 MG/1
50 TABLET ORAL 2 TIMES DAILY
Qty: 60 TABLET | Refills: 3 | Status: SHIPPED | OUTPATIENT
Start: 2019-06-26 | End: 2019-07-29 | Stop reason: SDUPTHER

## 2019-07-16 DIAGNOSIS — E11.9 TYPE 2 DIABETES MELLITUS WITHOUT COMPLICATION, WITHOUT LONG-TERM CURRENT USE OF INSULIN (HCC): ICD-10-CM

## 2019-07-19 DIAGNOSIS — E11.9 TYPE 2 DIABETES MELLITUS WITHOUT COMPLICATION, WITHOUT LONG-TERM CURRENT USE OF INSULIN (HCC): ICD-10-CM

## 2019-07-19 RX ORDER — LANCETS 30 GAUGE
1 EACH MISCELLANEOUS 2 TIMES DAILY
Qty: 100 EACH | Refills: 5 | Status: ON HOLD | OUTPATIENT
Start: 2019-07-19 | End: 2020-05-28

## 2019-07-19 RX ORDER — GLUCOSAMINE HCL/CHONDROITIN SU 500-400 MG
CAPSULE ORAL
Qty: 100 STRIP | Refills: 5 | Status: ON HOLD | OUTPATIENT
Start: 2019-07-19 | End: 2020-07-18 | Stop reason: HOSPADM

## 2019-07-29 ENCOUNTER — OFFICE VISIT (OUTPATIENT)
Dept: FAMILY MEDICINE CLINIC | Age: 79
End: 2019-07-29
Payer: MEDICARE

## 2019-07-29 VITALS
HEIGHT: 70 IN | HEART RATE: 65 BPM | SYSTOLIC BLOOD PRESSURE: 130 MMHG | WEIGHT: 227 LBS | DIASTOLIC BLOOD PRESSURE: 60 MMHG | BODY MASS INDEX: 32.5 KG/M2 | OXYGEN SATURATION: 98 %

## 2019-07-29 DIAGNOSIS — Z00.00 ROUTINE GENERAL MEDICAL EXAMINATION AT A HEALTH CARE FACILITY: Primary | ICD-10-CM

## 2019-07-29 DIAGNOSIS — E11.9 TYPE 2 DIABETES MELLITUS WITHOUT COMPLICATION, WITHOUT LONG-TERM CURRENT USE OF INSULIN (HCC): ICD-10-CM

## 2019-07-29 DIAGNOSIS — F41.9 ANXIETY: ICD-10-CM

## 2019-07-29 DIAGNOSIS — I10 ESSENTIAL HYPERTENSION: ICD-10-CM

## 2019-07-29 DIAGNOSIS — K21.9 GASTROESOPHAGEAL REFLUX DISEASE WITHOUT ESOPHAGITIS: ICD-10-CM

## 2019-07-29 PROCEDURE — G0438 PPPS, INITIAL VISIT: HCPCS | Performed by: INTERNAL MEDICINE

## 2019-07-29 PROCEDURE — 1123F ACP DISCUSS/DSCN MKR DOCD: CPT | Performed by: INTERNAL MEDICINE

## 2019-07-29 PROCEDURE — 4040F PNEUMOC VAC/ADMIN/RCVD: CPT | Performed by: INTERNAL MEDICINE

## 2019-07-29 RX ORDER — RANITIDINE 150 MG/1
150 TABLET ORAL DAILY
Qty: 90 TABLET | Refills: 1 | Status: SHIPPED | OUTPATIENT
Start: 2019-07-29 | End: 2019-11-18

## 2019-07-29 RX ORDER — OMEPRAZOLE 20 MG/1
20 CAPSULE, DELAYED RELEASE ORAL
Qty: 180 CAPSULE | Refills: 1 | Status: SHIPPED | OUTPATIENT
Start: 2019-07-29 | End: 2019-11-07 | Stop reason: SDUPTHER

## 2019-07-29 RX ORDER — GLIMEPIRIDE 1 MG/1
1 TABLET ORAL 2 TIMES DAILY
Qty: 180 TABLET | Refills: 1 | Status: SHIPPED | OUTPATIENT
Start: 2019-07-29 | End: 2019-11-07 | Stop reason: SDUPTHER

## 2019-07-29 RX ORDER — DILTIAZEM HYDROCHLORIDE 180 MG/1
180 CAPSULE, COATED, EXTENDED RELEASE ORAL DAILY
Qty: 90 CAPSULE | Refills: 1 | Status: SHIPPED | OUTPATIENT
Start: 2019-07-29 | End: 2019-11-07 | Stop reason: SDUPTHER

## 2019-07-29 RX ORDER — DULOXETIN HYDROCHLORIDE 30 MG/1
30 CAPSULE, DELAYED RELEASE ORAL NIGHTLY
Qty: 90 CAPSULE | Refills: 1 | Status: SHIPPED | OUTPATIENT
Start: 2019-07-29 | End: 2019-11-07 | Stop reason: SDUPTHER

## 2019-07-29 RX ORDER — ATENOLOL 50 MG/1
50 TABLET ORAL 2 TIMES DAILY
Qty: 180 TABLET | Refills: 1 | Status: SHIPPED | OUTPATIENT
Start: 2019-07-29 | End: 2019-11-07 | Stop reason: SDUPTHER

## 2019-07-29 ASSESSMENT — LIFESTYLE VARIABLES: HOW OFTEN DO YOU HAVE A DRINK CONTAINING ALCOHOL: 0

## 2019-07-29 ASSESSMENT — PATIENT HEALTH QUESTIONNAIRE - PHQ9: SUM OF ALL RESPONSES TO PHQ QUESTIONS 1-9: 7

## 2019-07-29 NOTE — PATIENT INSTRUCTIONS
radiation with an SPF of 30 or greater. Reapply every 2 to 3 hours or after sweating, drying off with a towel, or swimming. · Always wear a seat belt when traveling in a car. Always wear a helmet when riding a bicycle or motorcycle.

## 2019-07-29 NOTE — PROGRESS NOTES
Medicare Annual Wellness Visit  Name: Dona Herrera Date: 2019   MRN: [id number] Sex: Female   Age: 70 y.o. Ethnicity: Non-/Non    : 1940 Race: Jhon Talley is here for Medicare AWV    Screenings for behavioral, psychosocial and functional/safety risks, and cognitive dysfunction are all negative except as indicated below. These results, as well as other patient data from the [address] form, are documented in Flowsheets linked to this Encounter. Allergies   Allergen Reactions    Penicillins Hives    Sulfa Antibiotics Hives    Aspirin Other (See Comments)     Nose bleeds in high doses       Prior to Visit Medications    Medication Sig Taking?  Authorizing Provider   blood glucose monitor strips Test Blood sugar Twice a day and as needed  Please disense to go with meter Yes Silas Villasenor MD   Lancets MISC 1 each by Does not apply route 2 times daily Pt test daily  DX E11.9  Please dispense lancets that goes with her meter Yes Silas Villasenor MD   metFORMIN (GLUCOPHAGE) 500 MG tablet Take 1 tablet by mouth 2 times daily (with meals) Yes Silas Villasenor MD   atenolol (TENORMIN) 50 MG tablet Take 1 tablet by mouth 2 times daily Yes Silas Villasenor MD   glimepiride (AMARYL) 1 MG tablet Take 1 tablet by mouth 2 times daily Yes Silas Villasenor MD   Blood Glucose Monitoring Suppl (ONE TOUCH ULTRA 2) w/Device KIT 1 kit by Does not apply route daily Yes Silas Villasenor MD   DULoxetine (CYMBALTA) 60 MG extended release capsule Take 1 capsule by mouth daily Yes Silas Villasenor MD   diclofenac sodium 1 % GEL Apply 2 g topically 2 times daily Yes Silas Villasenor MD   DULoxetine (CYMBALTA) 30 MG extended release capsule Take 1 capsule by mouth nightly Yes Silas Villasenor MD   acetaminophen (TYLENOL) 500 MG tablet Take 500 mg by mouth Yes Historical Provider, MD   cetirizine (ZYRTEC) 10 MG tablet Take 10 mg by mouth daily Yes Historical Provider, MD diltiazem (CARTIA XT) 180 MG extended release capsule Take 1 capsule by mouth daily Yes Raymon Pichardo MD   ranitidine (ZANTAC) 150 MG tablet Take 1 tablet by mouth daily Yes Raymon Pichardo MD   omeprazole (PRILOSEC) 20 MG delayed release capsule Take 1 capsule by mouth 2 times daily (before meals) Yes Raymon Pichardo MD   aspirin 325 MG tablet Take 325 mg by mouth Yes Historical Provider, MD   Calcium Polycarbophil (FIBER-CAPS PO) Take 5 capsules by mouth daily Yes Historical Provider, MD   blood glucose monitor kit and supplies Please dispense on that is covered by her insurance tests daily  DX E11.9 Yes Raymon Pichardo MD   fluticasone (FLONASE) 50 MCG/ACT nasal spray 1 spray by Nasal route daily Yes Raymon Pichardo MD   vitamin B-12 (CYANOCOBALAMIN) 1000 MCG tablet Take 1,000 mcg by mouth daily Yes Historical Provider, MD   vitamin D (CHOLECALCIFEROL) 1000 UNIT TABS tablet Take 1,000 Units by mouth 2 times daily Yes Historical Provider, MD   Glucosamine-Chondroit-Vit C-Mn (GLUCOSAMINE 1500 COMPLEX PO) Take 1 capsule by mouth daily Yes Historical Provider, MD   docusate sodium (COLACE) 250 MG capsule Take 250 mg by mouth 2 times daily Yes Historical Provider, MD   Multiple Vitamins-Minerals (THERAPEUTIC MULTIVITAMIN-MINERALS) tablet Take 1 tablet by mouth daily Yes Historical Provider, MD   atenolol (TENORMIN) 25 MG tablet Take 1 tablet by mouth 2 times daily  Raymon Pichardo MD   clotrimazole-betamethasone (LOTRISONE) 1-0.05 % cream Apply topically 2 times daily.   Raymon Pichardo MD   meclizine (ANTIVERT) 25 MG tablet Take 1 tablet by mouth 3 times daily as needed for Dizziness  Raymon Pichardo MD   calcium carbonate 600 MG TABS tablet Take 1 tablet by mouth 2 times daily as needed  Historical Provider, MD     Past Medical History:   Diagnosis Date    Anemia     Chicken pox     Chronic back pain     Diabetes mellitus ([address]     Gastroesophageal reflux disease without esophagitis 12/7/2017    Headache     Hypertension     Measles     Mumps     Osteoarthritis     Whooping cough      Past Surgical History:   Procedure Laterality Date    CARPAL TUNNEL RELEASE Left 2005    JOINT REPLACEMENT Right 2004    right knee    JOINT REPLACEMENT Left 2005    left knee    JOINT REPLACEMENT Right 2016    right knee    JOINT REPLACEMENT Right 2001    right hip    JOINT REPLACEMENT Left 2006    left hip    TOE SURGERY Left 2006    joint removed from 2nd toe     Family History   Problem Relation Age of Onset    High Blood Pressure Mother     Parkinsonism Mother     High Blood Pressure Father     Diabetes Father     Heart Attack Father     Diabetes Sister     High Blood Pressure Sister     Arrhythmia Sister     Diabetes Brother     High Blood Pressure Brother        CareTeam (Including outside providers/suppliers regularly involved in providing care):   Patient Care Team:  Jose Jerome MD as PCP - General (Hospitalist)  Jose Jerome MD as PCP - [facility] Empaneled Provider    Wt Readings from Last 3 Encounters:   07/29/19 227 lb (103 kg)   05/09/19 225 lb (102.1 kg)   03/19/19 232 lb (105.2 kg)     Vitals:    07/29/19 1437 07/29/19 1455   BP: (!) 142/80 130/60   Site: Left Upper Arm Left Upper Arm   Position: Sitting Sitting   Cuff Size: Medium Adult Large Adult   Pulse: 65    SpO2: 98%    Weight: 227 lb (103 kg)    Height: 5' 10\" (1.778 m)      Body mass index is 32.57 kg/m². Based upon direct observation of the patient, evaluation of cognition reveals recent and remote memory intact.     General Appearance: alert and oriented to person, place and time, well developed and well- nourished, in no acute distress  Skin: warm and dry, no rash or erythema  Head: normocephalic and atraumatic  Eyes: pupils equal, round, and reactive to light,  conjunctivae normal  ENT: tympanic membrane, external ear and ear canal normal bilaterally, nose without deformity, nasal mucosa and turbinates normal without

## 2019-08-08 ENCOUNTER — OFFICE VISIT (OUTPATIENT)
Dept: FAMILY MEDICINE CLINIC | Age: 79
End: 2019-08-08
Payer: MEDICARE

## 2019-08-08 VITALS
WEIGHT: 228 LBS | DIASTOLIC BLOOD PRESSURE: 78 MMHG | HEIGHT: 68 IN | HEART RATE: 78 BPM | SYSTOLIC BLOOD PRESSURE: 128 MMHG | BODY MASS INDEX: 34.56 KG/M2

## 2019-08-08 DIAGNOSIS — K21.9 GASTROESOPHAGEAL REFLUX DISEASE WITHOUT ESOPHAGITIS: ICD-10-CM

## 2019-08-08 DIAGNOSIS — I10 ESSENTIAL HYPERTENSION: ICD-10-CM

## 2019-08-08 DIAGNOSIS — Z13.220 LIPID SCREENING: ICD-10-CM

## 2019-08-08 DIAGNOSIS — E11.9 TYPE 2 DIABETES MELLITUS WITHOUT COMPLICATION, WITHOUT LONG-TERM CURRENT USE OF INSULIN (HCC): Primary | ICD-10-CM

## 2019-08-08 DIAGNOSIS — I48.91 ATRIAL FIBRILLATION WITH RVR (HCC): ICD-10-CM

## 2019-08-08 LAB — HBA1C MFR BLD: 5.6 %

## 2019-08-08 PROCEDURE — 0518F FALL PLAN OF CARE DOCD: CPT | Performed by: INTERNAL MEDICINE

## 2019-08-08 PROCEDURE — 1090F PRES/ABSN URINE INCON ASSESS: CPT | Performed by: INTERNAL MEDICINE

## 2019-08-08 PROCEDURE — 1036F TOBACCO NON-USER: CPT | Performed by: INTERNAL MEDICINE

## 2019-08-08 PROCEDURE — G8427 DOCREV CUR MEDS BY ELIG CLIN: HCPCS | Performed by: INTERNAL MEDICINE

## 2019-08-08 PROCEDURE — G8417 CALC BMI ABV UP PARAM F/U: HCPCS | Performed by: INTERNAL MEDICINE

## 2019-08-08 PROCEDURE — G8400 PT W/DXA NO RESULTS DOC: HCPCS | Performed by: INTERNAL MEDICINE

## 2019-08-08 PROCEDURE — 4040F PNEUMOC VAC/ADMIN/RCVD: CPT | Performed by: INTERNAL MEDICINE

## 2019-08-08 PROCEDURE — 83036 HEMOGLOBIN GLYCOSYLATED A1C: CPT | Performed by: INTERNAL MEDICINE

## 2019-08-08 PROCEDURE — 99213 OFFICE O/P EST LOW 20 MIN: CPT | Performed by: INTERNAL MEDICINE

## 2019-08-08 PROCEDURE — 3288F FALL RISK ASSESSMENT DOCD: CPT | Performed by: INTERNAL MEDICINE

## 2019-08-08 PROCEDURE — 1123F ACP DISCUSS/DSCN MKR DOCD: CPT | Performed by: INTERNAL MEDICINE

## 2019-08-08 RX ORDER — LOSARTAN POTASSIUM 25 MG/1
25 TABLET ORAL DAILY
Qty: 90 TABLET | Refills: 1 | Status: SHIPPED | OUTPATIENT
Start: 2019-08-08 | End: 2019-11-07 | Stop reason: SDUPTHER

## 2019-08-08 ASSESSMENT — ENCOUNTER SYMPTOMS
BLURRED VISION: 0
COUGH: 0
SORE THROAT: 0
NAUSEA: 0
WHEEZING: 0
ABDOMINAL PAIN: 0
HEARTBURN: 1
SHORTNESS OF BREATH: 0

## 2019-08-08 NOTE — PROGRESS NOTES
HPI Notes    Name: Stacy Covert  : 1940         Chief Complaint:     Chief Complaint   Patient presents with    Check-Up    Diabetes      BS elevated at home. Last HgA1c  18 6/3     Hypertension    Gastroesophageal Reflux    Mental Health Problem    Allergies    Shoulder Pain       History of Present Illness:        Mrs. Barakat's office to follow-up for diabetes, hypertension, GERD  She is accompanied by her daughter. She says sugar levels are going up. Had several occasions with sugar in 200s    Diabetes   She presents for her follow-up diabetic visit. She has type 2 diabetes mellitus. Her disease course has been stable. There are no hypoglycemic associated symptoms. Pertinent negatives for hypoglycemia include no confusion, dizziness, headaches, nervousness/anxiousness or tremors. Associated symptoms include foot paresthesias and weakness (legs). Pertinent negatives for diabetes include no blurred vision, no chest pain and no fatigue. There are no hypoglycemic complications. Symptoms are stable. Diabetic complications include peripheral neuropathy. Pertinent negatives for diabetic complications include no CVA, heart disease or nephropathy. Risk factors for coronary artery disease include diabetes mellitus, hypertension, post-menopausal, sedentary lifestyle and obesity. Current diabetic treatment includes oral agent (dual therapy). She is compliant with treatment all of the time. She is following a generally healthy diet. An ACE inhibitor/angiotensin II receptor blocker is not being taken (cough with ACEI). Eye exam is current. Hypertension   This is a chronic problem. The current episode started more than 1 year ago. The problem is unchanged. The problem is uncontrolled. Pertinent negatives include no blurred vision, chest pain, headaches, palpitations or shortness of breath. Past treatments include beta blockers and calcium channel blockers.  The current treatment provides moderate Wilver Keating MD   glimepiride (AMARYL) 1 MG tablet Take 1 tablet by mouth 2 times daily 7/29/19  Yes Wilver Keating MD   atenolol (TENORMIN) 50 MG tablet Take 1 tablet by mouth 2 times daily 7/29/19  Yes Wilver Keating MD   DULoxetine (CYMBALTA) 30 MG extended release capsule Take 1 capsule by mouth nightly 7/29/19  Yes Wilver Keating MD   omeprazole (PRILOSEC) 20 MG delayed release capsule Take 1 capsule by mouth 2 times daily (before meals) 7/29/19  Yes Wilver Keating MD   ranitidine (ZANTAC) 150 MG tablet Take 1 tablet by mouth daily 7/29/19  Yes Wilver Keating MD   metFORMIN (GLUCOPHAGE) 500 MG tablet Take 1 tablet by mouth 2 times daily (with meals) 7/16/19  Yes Wilver Keating MD   DULoxetine (CYMBALTA) 60 MG extended release capsule Take 1 capsule by mouth daily 5/9/19  Yes Wilver Keating MD   diclofenac sodium 1 % GEL Apply 2 g topically 2 times daily 5/9/19  Yes Wilver Keating MD   cetirizine (ZYRTEC) 10 MG tablet Take 10 mg by mouth daily   Yes Historical Provider, MD   aspirin 325 MG tablet Take 325 mg by mouth   Yes Historical Provider, MD   Calcium Polycarbophil (FIBER-CAPS PO) Take 5 capsules by mouth daily   Yes Historical Provider, MD   fluticasone (FLONASE) 50 MCG/ACT nasal spray 1 spray by Nasal route daily 6/11/18  Yes Wilver Keating MD   calcium carbonate 600 MG TABS tablet Take 1 tablet by mouth 2 times daily as needed   Yes Historical Provider, MD   vitamin B-12 (CYANOCOBALAMIN) 1000 MCG tablet Take 1,000 mcg by mouth daily   Yes Historical Provider, MD   vitamin D (CHOLECALCIFEROL) 1000 UNIT TABS tablet Take 1,000 Units by mouth 2 times daily   Yes Historical Provider, MD   Glucosamine-Chondroit-Vit C-Mn (GLUCOSAMINE 1500 COMPLEX PO) Take 1 capsule by mouth daily   Yes Historical Provider, MD   docusate sodium (COLACE) 250 MG capsule Take 250 mg by mouth 2 times daily   Yes Historical Provider, MD   Multiple Vitamins-Minerals (THERAPEUTIC MULTIVITAMIN-MINERALS) tablet Take (legs). Negative for dizziness, tremors, syncope and headaches. Psychiatric/Behavioral: Negative for confusion and sleep disturbance. The patient is not nervous/anxious. Physical Exam:     Vitals:  BP (!) 150/84 (Site: Left Upper Arm, Position: Sitting, Cuff Size: Large Adult)   Pulse 78   Ht 5' 8\" (1.727 m)   Wt 228 lb (103.4 kg)   BMI 34.67 kg/m²       Physical Exam   Constitutional: She is oriented to person, place, and time. She appears well-developed and well-nourished. No distress. HENT:   Head: Normocephalic and atraumatic. Right Ear: External ear normal.   Left Ear: External ear normal.   Mouth/Throat: Oropharynx is clear and moist. No oropharyngeal exudate. Eyes: Conjunctivae are normal. Right eye exhibits no discharge. Left eye exhibits no discharge. No scleral icterus. Neck: No thyromegaly present. Cardiovascular: Normal rate, regular rhythm and normal heart sounds. No murmur heard. Pulmonary/Chest: Effort normal and breath sounds normal. She has no wheezes. She has no rales. Abdominal: Soft. Bowel sounds are normal. She exhibits no distension and no mass. There is no tenderness. Musculoskeletal: Normal range of motion. She exhibits edema (both legs mildly swollen). She exhibits no tenderness. Lymphadenopathy:     She has no cervical adenopathy. Neurological: She is alert and oriented to person, place, and time. No cranial nerve deficit. Coordination normal.   Skin: Skin is warm and dry. No rash noted. Psychiatric: She has a normal mood and affect. Her behavior is normal. Judgment normal.   Vitals reviewed.             Data:     Lab Results   Component Value Date     01/10/2019    K 4.8 01/10/2019    CL 96 01/10/2019    CO2 24 01/10/2019    BUN 16 01/10/2019    CREATININE 0.56 01/10/2019    CREATININE 0.7 09/08/2017    GLUCOSE 182 01/10/2019    PROT 6.9 03/15/2019    LABALBU 4.6 01/10/2019    BILITOT 1.33 01/10/2019    ALKPHOS 88 01/10/2019    AST 26 01/10/2019 Refills    losartan (COZAAR) 25 MG tablet 90 tablet 1     Sig: Take 1 tablet by mouth daily

## 2019-08-13 DIAGNOSIS — Z13.220 LIPID SCREENING: ICD-10-CM

## 2019-08-13 DIAGNOSIS — I10 ESSENTIAL HYPERTENSION: ICD-10-CM

## 2019-08-13 LAB
BUN BLDV-MCNC: 17 MG/DL
CALCIUM SERPL-MCNC: 9.5 MG/DL
CHLORIDE BLD-SCNC: 105 MMOL/L
CHOLESTEROL, TOTAL: 192 MG/DL
CHOLESTEROL/HDL RATIO: 3
CO2: 29 MMOL/L
CREAT SERPL-MCNC: 0.62 MG/DL
GFR CALCULATED: 88
GLUCOSE BLD-MCNC: 110 MG/DL
HDLC SERPL-MCNC: 65 MG/DL (ref 35–70)
LDL CHOLESTEROL CALCULATED: 105 MG/DL (ref 0–160)
POTASSIUM SERPL-SCNC: 4.4 MMOL/L
SODIUM BLD-SCNC: 140 MMOL/L
TRIGL SERPL-MCNC: 109 MG/DL
TSH SERPL DL<=0.05 MIU/L-ACNC: 1.61 UIU/ML
VLDLC SERPL CALC-MCNC: NORMAL MG/DL

## 2019-10-07 ENCOUNTER — TELEPHONE (OUTPATIENT)
Dept: FAMILY MEDICINE CLINIC | Age: 79
End: 2019-10-07

## 2019-10-07 RX ORDER — CIPROFLOXACIN 250 MG/1
250 TABLET, FILM COATED ORAL 2 TIMES DAILY
Qty: 10 TABLET | Refills: 1 | Status: SHIPPED | OUTPATIENT
Start: 2019-10-07 | End: 2019-10-12

## 2019-10-10 DIAGNOSIS — E11.9 TYPE 2 DIABETES MELLITUS WITHOUT COMPLICATION, WITHOUT LONG-TERM CURRENT USE OF INSULIN (HCC): ICD-10-CM

## 2019-10-16 ENCOUNTER — TELEPHONE (OUTPATIENT)
Dept: FAMILY MEDICINE CLINIC | Age: 79
End: 2019-10-16

## 2019-10-16 RX ORDER — NITROFURANTOIN 25; 75 MG/1; MG/1
100 CAPSULE ORAL 2 TIMES DAILY
Qty: 14 CAPSULE | Refills: 0 | Status: SHIPPED | OUTPATIENT
Start: 2019-10-16 | End: 2019-10-23

## 2019-11-07 ENCOUNTER — OFFICE VISIT (OUTPATIENT)
Dept: FAMILY MEDICINE CLINIC | Age: 79
End: 2019-11-07
Payer: MEDICARE

## 2019-11-07 VITALS
HEART RATE: 66 BPM | OXYGEN SATURATION: 97 % | WEIGHT: 234 LBS | DIASTOLIC BLOOD PRESSURE: 80 MMHG | SYSTOLIC BLOOD PRESSURE: 138 MMHG | HEIGHT: 68 IN | BODY MASS INDEX: 35.46 KG/M2

## 2019-11-07 DIAGNOSIS — K21.9 GASTROESOPHAGEAL REFLUX DISEASE WITHOUT ESOPHAGITIS: ICD-10-CM

## 2019-11-07 DIAGNOSIS — R53.1 GENERAL WEAKNESS: ICD-10-CM

## 2019-11-07 DIAGNOSIS — I10 ESSENTIAL HYPERTENSION: ICD-10-CM

## 2019-11-07 DIAGNOSIS — F41.9 ANXIETY: ICD-10-CM

## 2019-11-07 DIAGNOSIS — E11.9 TYPE 2 DIABETES MELLITUS WITHOUT COMPLICATION, WITHOUT LONG-TERM CURRENT USE OF INSULIN (HCC): Primary | ICD-10-CM

## 2019-11-07 PROCEDURE — G8482 FLU IMMUNIZE ORDER/ADMIN: HCPCS | Performed by: INTERNAL MEDICINE

## 2019-11-07 PROCEDURE — 1090F PRES/ABSN URINE INCON ASSESS: CPT | Performed by: INTERNAL MEDICINE

## 2019-11-07 PROCEDURE — G0008 ADMIN INFLUENZA VIRUS VAC: HCPCS | Performed by: INTERNAL MEDICINE

## 2019-11-07 PROCEDURE — 3288F FALL RISK ASSESSMENT DOCD: CPT | Performed by: INTERNAL MEDICINE

## 2019-11-07 PROCEDURE — 4040F PNEUMOC VAC/ADMIN/RCVD: CPT | Performed by: INTERNAL MEDICINE

## 2019-11-07 PROCEDURE — 1036F TOBACCO NON-USER: CPT | Performed by: INTERNAL MEDICINE

## 2019-11-07 PROCEDURE — 0518F FALL PLAN OF CARE DOCD: CPT | Performed by: INTERNAL MEDICINE

## 2019-11-07 PROCEDURE — G8427 DOCREV CUR MEDS BY ELIG CLIN: HCPCS | Performed by: INTERNAL MEDICINE

## 2019-11-07 PROCEDURE — 99214 OFFICE O/P EST MOD 30 MIN: CPT | Performed by: INTERNAL MEDICINE

## 2019-11-07 PROCEDURE — G8417 CALC BMI ABV UP PARAM F/U: HCPCS | Performed by: INTERNAL MEDICINE

## 2019-11-07 PROCEDURE — G8400 PT W/DXA NO RESULTS DOC: HCPCS | Performed by: INTERNAL MEDICINE

## 2019-11-07 PROCEDURE — 90686 IIV4 VACC NO PRSV 0.5 ML IM: CPT | Performed by: INTERNAL MEDICINE

## 2019-11-07 PROCEDURE — 1123F ACP DISCUSS/DSCN MKR DOCD: CPT | Performed by: INTERNAL MEDICINE

## 2019-11-07 RX ORDER — GLIMEPIRIDE 1 MG/1
1 TABLET ORAL 2 TIMES DAILY
Qty: 180 TABLET | Refills: 1 | Status: ON HOLD | OUTPATIENT
Start: 2019-11-07 | End: 2020-05-28

## 2019-11-07 RX ORDER — DILTIAZEM HYDROCHLORIDE 180 MG/1
180 CAPSULE, COATED, EXTENDED RELEASE ORAL DAILY
Qty: 90 CAPSULE | Refills: 1 | Status: ON HOLD | OUTPATIENT
Start: 2019-11-07 | End: 2020-06-04 | Stop reason: HOSPADM

## 2019-11-07 RX ORDER — ATENOLOL 50 MG/1
50 TABLET ORAL 2 TIMES DAILY
Qty: 180 TABLET | Refills: 1 | Status: SHIPPED | OUTPATIENT
Start: 2019-11-07 | End: 2020-01-23 | Stop reason: SDUPTHER

## 2019-11-07 RX ORDER — LOSARTAN POTASSIUM 25 MG/1
25 TABLET ORAL DAILY
Qty: 90 TABLET | Refills: 1 | Status: SHIPPED | OUTPATIENT
Start: 2019-11-07 | End: 2020-01-23 | Stop reason: SDUPTHER

## 2019-11-07 RX ORDER — DULOXETIN HYDROCHLORIDE 60 MG/1
60 CAPSULE, DELAYED RELEASE ORAL DAILY
Qty: 90 CAPSULE | Refills: 1 | Status: ON HOLD | OUTPATIENT
Start: 2019-11-07 | End: 2020-05-28

## 2019-11-07 RX ORDER — DULOXETIN HYDROCHLORIDE 30 MG/1
30 CAPSULE, DELAYED RELEASE ORAL NIGHTLY
Qty: 90 CAPSULE | Refills: 1 | Status: SHIPPED | OUTPATIENT
Start: 2019-11-07 | End: 2020-01-23 | Stop reason: SDUPTHER

## 2019-11-07 RX ORDER — OMEPRAZOLE 20 MG/1
20 CAPSULE, DELAYED RELEASE ORAL
Qty: 180 CAPSULE | Refills: 1 | Status: SHIPPED | OUTPATIENT
Start: 2019-11-07 | End: 2020-01-23 | Stop reason: SDUPTHER

## 2019-11-07 RX ORDER — FAMOTIDINE 20 MG/1
20 TABLET, FILM COATED ORAL NIGHTLY PRN
Qty: 30 TABLET | Refills: 5 | Status: SHIPPED | OUTPATIENT
Start: 2019-11-07 | End: 2020-01-23 | Stop reason: SDUPTHER

## 2019-11-07 ASSESSMENT — ENCOUNTER SYMPTOMS
NAUSEA: 0
HEARTBURN: 1
SHORTNESS OF BREATH: 0
BELCHING: 1
BACK PAIN: 0
SORE THROAT: 0
WHEEZING: 0
ABDOMINAL PAIN: 0
SINUS PRESSURE: 0
COUGH: 0

## 2019-11-15 ENCOUNTER — HOSPITAL ENCOUNTER (EMERGENCY)
Age: 79
Discharge: HOME | End: 2019-11-15
Attending: EMERGENCY MEDICINE
Payer: MEDICARE

## 2019-11-15 VITALS
RESPIRATION RATE: 23 BRPM | DIASTOLIC BLOOD PRESSURE: 60 MMHG | HEART RATE: 66 BPM | SYSTOLIC BLOOD PRESSURE: 150 MMHG | OXYGEN SATURATION: 100 % | TEMPERATURE: 97.5 F

## 2019-11-15 DIAGNOSIS — R29.6 RECURRENT FALLS: Primary | ICD-10-CM

## 2019-11-15 LAB
-: NORMAL
AMORPHOUS: NORMAL
BACTERIA: NORMAL
BILIRUBIN URINE: NEGATIVE
CASTS UA: NORMAL /LPF
COLOR: YELLOW
COMMENT UA: ABNORMAL
CRYSTALS, UA: NORMAL /HPF
EPITHELIAL CELLS UA: NORMAL /HPF
GLUCOSE BLD-MCNC: 95 MG/DL
GLUCOSE BLD-MCNC: 95 MG/DL (ref 65–99)
GLUCOSE URINE: NEGATIVE
KETONES, URINE: ABNORMAL
LEUKOCYTE ESTERASE, URINE: NEGATIVE
MUCUS: NORMAL
NITRITE, URINE: NEGATIVE
OTHER OBSERVATIONS UA: NORMAL
PH UA: 6 (ref 5–8)
PROTEIN UA: NEGATIVE
RBC UA: NORMAL /HPF (ref 0–2)
RENAL EPITHELIAL, UA: NORMAL /HPF
SPECIFIC GRAVITY UA: 1.02 (ref 1–1.03)
TRICHOMONAS: NORMAL
TURBIDITY: CLEAR
URINE HGB: ABNORMAL
UROBILINOGEN, URINE: NORMAL
WBC UA: NORMAL /HPF
YEAST: NORMAL

## 2019-11-15 PROCEDURE — 82947 ASSAY GLUCOSE BLOOD QUANT: CPT

## 2019-11-15 PROCEDURE — 81001 URINALYSIS AUTO W/SCOPE: CPT

## 2019-11-15 PROCEDURE — 99284 EMERGENCY DEPT VISIT MOD MDM: CPT

## 2019-11-18 ENCOUNTER — TELEPHONE (OUTPATIENT)
Dept: FAMILY MEDICINE CLINIC | Age: 79
End: 2019-11-18

## 2019-11-18 RX ORDER — FAMOTIDINE 20 MG/1
20 TABLET, FILM COATED ORAL 2 TIMES DAILY
Qty: 60 TABLET | Refills: 5 | Status: SHIPPED | OUTPATIENT
Start: 2019-11-18 | End: 2020-05-27

## 2019-12-21 ENCOUNTER — APPOINTMENT (OUTPATIENT)
Dept: CT IMAGING | Age: 79
End: 2019-12-21
Payer: MEDICARE

## 2019-12-21 ENCOUNTER — HOSPITAL ENCOUNTER (EMERGENCY)
Age: 79
Discharge: HOME | End: 2019-12-21
Attending: FAMILY MEDICINE
Payer: MEDICARE

## 2019-12-21 ENCOUNTER — APPOINTMENT (OUTPATIENT)
Dept: GENERAL RADIOLOGY | Age: 79
End: 2019-12-21
Payer: MEDICARE

## 2019-12-21 VITALS
BODY MASS INDEX: 35.9 KG/M2 | RESPIRATION RATE: 16 BRPM | SYSTOLIC BLOOD PRESSURE: 173 MMHG | HEART RATE: 79 BPM | OXYGEN SATURATION: 99 % | TEMPERATURE: 98.6 F | WEIGHT: 236.1 LBS | DIASTOLIC BLOOD PRESSURE: 69 MMHG

## 2019-12-21 DIAGNOSIS — R40.4 TRANSIENT ALTERATION OF AWARENESS: Primary | ICD-10-CM

## 2019-12-21 DIAGNOSIS — N39.0 URINARY TRACT INFECTION WITHOUT HEMATURIA, SITE UNSPECIFIED: ICD-10-CM

## 2019-12-21 LAB
-: ABNORMAL
ABSOLUTE EOS #: 0.1 K/UL (ref 0–0.4)
ABSOLUTE IMMATURE GRANULOCYTE: NORMAL K/UL (ref 0–0.3)
ABSOLUTE LYMPH #: 2.1 K/UL (ref 1–4.8)
ABSOLUTE MONO #: 0.6 K/UL (ref 0–1)
ALBUMIN SERPL-MCNC: 4.2 G/DL (ref 3.5–5.2)
ALBUMIN/GLOBULIN RATIO: ABNORMAL (ref 1–2.5)
ALP BLD-CCNC: 74 U/L (ref 35–104)
ALT SERPL-CCNC: 16 U/L (ref 5–33)
AMORPHOUS: ABNORMAL
AMPHETAMINE SCREEN URINE: NEGATIVE
ANION GAP SERPL CALCULATED.3IONS-SCNC: 12 MMOL/L (ref 9–17)
AST SERPL-CCNC: 19 U/L
BACTERIA: ABNORMAL
BARBITURATE SCREEN URINE: NEGATIVE
BASOPHILS # BLD: 0 % (ref 0–2)
BASOPHILS ABSOLUTE: 0 K/UL (ref 0–0.2)
BENZODIAZEPINE SCREEN, URINE: NEGATIVE
BILIRUB SERPL-MCNC: 1.08 MG/DL (ref 0.3–1.2)
BILIRUBIN URINE: NEGATIVE
BUN BLDV-MCNC: 19 MG/DL (ref 8–23)
BUN/CREAT BLD: 43 (ref 9–20)
BUPRENORPHINE URINE: NORMAL
CALCIUM SERPL-MCNC: 10.3 MG/DL (ref 8.6–10.4)
CANNABINOID SCREEN URINE: NEGATIVE
CASTS UA: ABNORMAL /LPF
CHLORIDE BLD-SCNC: 98 MMOL/L (ref 98–107)
CO2: 25 MMOL/L (ref 20–31)
COCAINE METABOLITE, URINE: NEGATIVE
COLOR: YELLOW
COMMENT UA: ABNORMAL
CREAT SERPL-MCNC: 0.44 MG/DL (ref 0.5–0.9)
CRYSTALS, UA: ABNORMAL /HPF
DIFFERENTIAL TYPE: YES
EOSINOPHILS RELATIVE PERCENT: 2 % (ref 0–5)
EPITHELIAL CELLS UA: ABNORMAL /HPF
GFR AFRICAN AMERICAN: >60 ML/MIN
GFR NON-AFRICAN AMERICAN: >60 ML/MIN
GFR SERPL CREATININE-BSD FRML MDRD: ABNORMAL ML/MIN/{1.73_M2}
GFR SERPL CREATININE-BSD FRML MDRD: ABNORMAL ML/MIN/{1.73_M2}
GLUCOSE BLD-MCNC: 177 MG/DL (ref 70–99)
GLUCOSE URINE: NEGATIVE
HCT VFR BLD CALC: 40.4 % (ref 36–46)
HEMOGLOBIN: 13.8 G/DL (ref 12–16)
IMMATURE GRANULOCYTES: NORMAL %
INR BLD: 0.9
KETONES, URINE: NEGATIVE
LEUKOCYTE ESTERASE, URINE: NEGATIVE
LYMPHOCYTES # BLD: 29 % (ref 15–40)
MCH RBC QN AUTO: 32.3 PG (ref 26–34)
MCHC RBC AUTO-ENTMCNC: 34 G/DL (ref 31–37)
MCV RBC AUTO: 94.8 FL (ref 80–100)
MDMA URINE: NORMAL
METHADONE SCREEN, URINE: NEGATIVE
METHAMPHETAMINE, URINE: NEGATIVE
MONOCYTES # BLD: 8 % (ref 4–8)
MUCUS: ABNORMAL
NITRITE, URINE: NEGATIVE
NRBC AUTOMATED: NORMAL PER 100 WBC
OPIATES, URINE: NEGATIVE
OTHER OBSERVATIONS UA: ABNORMAL
OXYCODONE SCREEN URINE: NEGATIVE
PARTIAL THROMBOPLASTIN TIME: 24.1 SEC (ref 21–33)
PDW BLD-RTO: 12.6 % (ref 12.1–15.2)
PH UA: 7 (ref 5–8)
PHENCYCLIDINE, URINE: NEGATIVE
PLATELET # BLD: 260 K/UL (ref 140–450)
PLATELET ESTIMATE: NORMAL
PMV BLD AUTO: NORMAL FL (ref 6–12)
POTASSIUM SERPL-SCNC: 4.7 MMOL/L (ref 3.7–5.3)
PROPOXYPHENE, URINE: NEGATIVE
PROTEIN UA: NEGATIVE
PROTHROMBIN TIME: 9.3 SEC (ref 9–11.6)
RBC # BLD: 4.26 M/UL (ref 4–5.2)
RBC # BLD: NORMAL 10*6/UL
RBC UA: ABNORMAL /HPF (ref 0–2)
RENAL EPITHELIAL, UA: ABNORMAL /HPF
SEG NEUTROPHILS: 61 % (ref 47–75)
SEGMENTED NEUTROPHILS ABSOLUTE COUNT: 4.3 K/UL (ref 2.5–7)
SODIUM BLD-SCNC: 135 MMOL/L (ref 135–144)
SPECIFIC GRAVITY UA: 1.01 (ref 1–1.03)
TEST INFORMATION: NORMAL
THYROXINE, FREE: 1.28 NG/DL (ref 0.93–1.7)
TOTAL PROTEIN: 7.1 G/DL (ref 6.4–8.3)
TRICHOMONAS: ABNORMAL
TRICYCLIC ANTIDEPRESSANTS, UR: NEGATIVE
TROPONIN INTERP: NORMAL
TROPONIN T: <0.03 NG/ML
TROPONIN, HIGH SENSITIVITY: NORMAL NG/L (ref 0–14)
TSH SERPL DL<=0.05 MIU/L-ACNC: 2.99 MIU/L (ref 0.3–5)
TURBIDITY: CLEAR
URINE HGB: ABNORMAL
UROBILINOGEN, URINE: NORMAL
WBC # BLD: 7.2 K/UL (ref 3.5–11)
WBC # BLD: NORMAL 10*3/UL
WBC UA: ABNORMAL /HPF
YEAST: ABNORMAL

## 2019-12-21 PROCEDURE — 85730 THROMBOPLASTIN TIME PARTIAL: CPT

## 2019-12-21 PROCEDURE — 80053 COMPREHEN METABOLIC PANEL: CPT

## 2019-12-21 PROCEDURE — 72125 CT NECK SPINE W/O DYE: CPT

## 2019-12-21 PROCEDURE — 80306 DRUG TEST PRSMV INSTRMNT: CPT

## 2019-12-21 PROCEDURE — 85610 PROTHROMBIN TIME: CPT

## 2019-12-21 PROCEDURE — 6360000002 HC RX W HCPCS: Performed by: FAMILY MEDICINE

## 2019-12-21 PROCEDURE — 96374 THER/PROPH/DIAG INJ IV PUSH: CPT

## 2019-12-21 PROCEDURE — 93005 ELECTROCARDIOGRAM TRACING: CPT | Performed by: FAMILY MEDICINE

## 2019-12-21 PROCEDURE — 84439 ASSAY OF FREE THYROXINE: CPT

## 2019-12-21 PROCEDURE — 6370000000 HC RX 637 (ALT 250 FOR IP): Performed by: FAMILY MEDICINE

## 2019-12-21 PROCEDURE — 71045 X-RAY EXAM CHEST 1 VIEW: CPT

## 2019-12-21 PROCEDURE — 85025 COMPLETE CBC W/AUTO DIFF WBC: CPT

## 2019-12-21 PROCEDURE — 81001 URINALYSIS AUTO W/SCOPE: CPT

## 2019-12-21 PROCEDURE — 84443 ASSAY THYROID STIM HORMONE: CPT

## 2019-12-21 PROCEDURE — 70450 CT HEAD/BRAIN W/O DYE: CPT

## 2019-12-21 PROCEDURE — 99285 EMERGENCY DEPT VISIT HI MDM: CPT

## 2019-12-21 PROCEDURE — 84484 ASSAY OF TROPONIN QUANT: CPT

## 2019-12-21 RX ORDER — KETOROLAC TROMETHAMINE 30 MG/ML
15 INJECTION, SOLUTION INTRAMUSCULAR; INTRAVENOUS ONCE
Status: COMPLETED | OUTPATIENT
Start: 2019-12-21 | End: 2019-12-21

## 2019-12-21 RX ORDER — NITROFURANTOIN 25; 75 MG/1; MG/1
100 CAPSULE ORAL 2 TIMES DAILY
Qty: 14 CAPSULE | Refills: 0 | Status: SHIPPED | OUTPATIENT
Start: 2019-12-21 | End: 2019-12-28

## 2019-12-21 RX ORDER — NITROFURANTOIN 25; 75 MG/1; MG/1
100 CAPSULE ORAL ONCE
Status: COMPLETED | OUTPATIENT
Start: 2019-12-21 | End: 2019-12-21

## 2019-12-21 RX ADMIN — NITROFURANTOIN MONOHYDRATE/MACROCRYSTALLINE 100 MG: 25; 75 CAPSULE ORAL at 14:42

## 2019-12-21 RX ADMIN — KETOROLAC TROMETHAMINE 15 MG: 30 INJECTION, SOLUTION INTRAMUSCULAR at 14:42

## 2019-12-21 ASSESSMENT — PAIN DESCRIPTION - ONSET: ONSET: ON-GOING

## 2019-12-21 ASSESSMENT — PAIN SCALES - GENERAL
PAINLEVEL_OUTOF10: 8
PAINLEVEL_OUTOF10: 6

## 2019-12-21 ASSESSMENT — PAIN DESCRIPTION - LOCATION: LOCATION: HEAD

## 2019-12-21 ASSESSMENT — PAIN DESCRIPTION - PAIN TYPE: TYPE: ACUTE PAIN

## 2019-12-21 ASSESSMENT — PAIN DESCRIPTION - PROGRESSION: CLINICAL_PROGRESSION: NOT CHANGED

## 2019-12-21 ASSESSMENT — PAIN DESCRIPTION - DESCRIPTORS: DESCRIPTORS: ACHING

## 2019-12-21 ASSESSMENT — PAIN DESCRIPTION - FREQUENCY: FREQUENCY: CONTINUOUS

## 2019-12-22 LAB
EKG ATRIAL RATE: 66 BPM
EKG P AXIS: 45 DEGREES
EKG P-R INTERVAL: 146 MS
EKG Q-T INTERVAL: 446 MS
EKG QRS DURATION: 100 MS
EKG QTC CALCULATION (BAZETT): 467 MS
EKG R AXIS: 60 DEGREES
EKG T AXIS: 77 DEGREES
EKG VENTRICULAR RATE: 66 BPM

## 2019-12-22 PROCEDURE — 93010 ELECTROCARDIOGRAM REPORT: CPT | Performed by: INTERNAL MEDICINE

## 2020-01-23 ENCOUNTER — OFFICE VISIT (OUTPATIENT)
Dept: FAMILY MEDICINE CLINIC | Age: 80
End: 2020-01-23
Payer: MEDICARE

## 2020-01-23 VITALS
HEART RATE: 86 BPM | OXYGEN SATURATION: 97 % | WEIGHT: 226 LBS | BODY MASS INDEX: 34.36 KG/M2 | DIASTOLIC BLOOD PRESSURE: 70 MMHG | SYSTOLIC BLOOD PRESSURE: 136 MMHG

## 2020-01-23 PROCEDURE — 4040F PNEUMOC VAC/ADMIN/RCVD: CPT | Performed by: INTERNAL MEDICINE

## 2020-01-23 PROCEDURE — G8482 FLU IMMUNIZE ORDER/ADMIN: HCPCS | Performed by: INTERNAL MEDICINE

## 2020-01-23 PROCEDURE — G8427 DOCREV CUR MEDS BY ELIG CLIN: HCPCS | Performed by: INTERNAL MEDICINE

## 2020-01-23 PROCEDURE — 1123F ACP DISCUSS/DSCN MKR DOCD: CPT | Performed by: INTERNAL MEDICINE

## 2020-01-23 PROCEDURE — G8400 PT W/DXA NO RESULTS DOC: HCPCS | Performed by: INTERNAL MEDICINE

## 2020-01-23 PROCEDURE — 1036F TOBACCO NON-USER: CPT | Performed by: INTERNAL MEDICINE

## 2020-01-23 PROCEDURE — 99213 OFFICE O/P EST LOW 20 MIN: CPT | Performed by: INTERNAL MEDICINE

## 2020-01-23 PROCEDURE — 1090F PRES/ABSN URINE INCON ASSESS: CPT | Performed by: INTERNAL MEDICINE

## 2020-01-23 PROCEDURE — G8417 CALC BMI ABV UP PARAM F/U: HCPCS | Performed by: INTERNAL MEDICINE

## 2020-01-23 RX ORDER — ATENOLOL 50 MG/1
50 TABLET ORAL 2 TIMES DAILY
Qty: 180 TABLET | Refills: 1 | Status: ON HOLD | OUTPATIENT
Start: 2020-01-23 | End: 2020-06-04 | Stop reason: HOSPADM

## 2020-01-23 RX ORDER — TRAMADOL HYDROCHLORIDE 50 MG/1
50 TABLET ORAL EVERY 4 HOURS PRN
Qty: 18 TABLET | Refills: 0 | Status: SHIPPED | OUTPATIENT
Start: 2020-01-23 | End: 2020-02-10 | Stop reason: SDUPTHER

## 2020-01-23 RX ORDER — DULOXETIN HYDROCHLORIDE 30 MG/1
30 CAPSULE, DELAYED RELEASE ORAL NIGHTLY
Qty: 90 CAPSULE | Refills: 1 | Status: ON HOLD | OUTPATIENT
Start: 2020-01-23 | End: 2020-06-04 | Stop reason: HOSPADM

## 2020-01-23 RX ORDER — FAMOTIDINE 20 MG/1
20 TABLET, FILM COATED ORAL NIGHTLY PRN
Qty: 90 TABLET | Refills: 1 | Status: SHIPPED | OUTPATIENT
Start: 2020-01-23 | End: 2020-07-14

## 2020-01-23 RX ORDER — LOSARTAN POTASSIUM 25 MG/1
25 TABLET ORAL DAILY
Qty: 90 TABLET | Refills: 1 | Status: ON HOLD | OUTPATIENT
Start: 2020-01-23 | End: 2020-05-28

## 2020-01-23 RX ORDER — LIDOCAINE 50 MG/G
OINTMENT TOPICAL
Qty: 50 G | Refills: 2 | Status: SHIPPED | OUTPATIENT
Start: 2020-01-23 | End: 2020-05-27

## 2020-01-23 RX ORDER — OMEPRAZOLE 20 MG/1
20 CAPSULE, DELAYED RELEASE ORAL
Qty: 180 CAPSULE | Refills: 1 | Status: ON HOLD | OUTPATIENT
Start: 2020-01-23 | End: 2020-05-28

## 2020-01-23 ASSESSMENT — ENCOUNTER SYMPTOMS
ABDOMINAL PAIN: 0
VISUAL CHANGE: 0
COUGH: 0
TROUBLE SWALLOWING: 0
SHORTNESS OF BREATH: 0
SORE THROAT: 0
NAUSEA: 0

## 2020-01-23 ASSESSMENT — PATIENT HEALTH QUESTIONNAIRE - PHQ9: DEPRESSION UNABLE TO ASSESS: FUNCTIONAL CAPACITY MOTIVATION LIMITS ACCURACY

## 2020-01-23 NOTE — PROGRESS NOTES
HPI Notes    Name: Sara Phillips  : 1940         Chief Complaint:     Chief Complaint   Patient presents with    Neck Pain     started getting worse the last month, used to come and go, its a lump she states at the base of her skull, putting pressure on it will help along with the heating pad, but the pain does not go away. has been taking Motrin 600mg and tylenol. History of Present Illness:        Yola Aggarwal comes to office for evaluation of neck pain. She is accompanied  by her daughter Peter Cooley who lives with her and very involved in her care. Yola Aggarwal  was in ER on  19 for evaluation of HA and neck pain. She had a CT neck that showed:      -Negative for acute fracture or dislocation of the cervical spine.     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 Aforementioned combined degenerative changes resulting in moderate to severe   central spinal canal stenosis at the level of C4-C5 and C5-C6 levels and   moderate central spinal canal stenosis at C3-C4. Severe multilevel neural   foraminal stenoses.  Findings remain grossly stable since prior comparison   dated 3/29/2016. No acute findings. If clinical concern for local cord or   ligamentous injury, recommend follow-up with dedicated MR. Has been taking Ibuprofen 600 mg tid, Tylenol at night time, trying heating pad,   States had only mild alleviation of the pain.             Neck Pain    This is a chronic problem. The current episode started more than 1 month ago. The problem occurs constantly. The problem has been unchanged. The pain is associated with nothing. The pain is present in the occipital region. The quality of the pain is described as aching and shooting. The pain is moderate. The symptoms are aggravated by position and twisting (turning head). The pain is worse during the night. Pertinent negatives include no chest pain, fever, headaches, numbness, syncope, trouble swallowing or visual change. She has tried acetaminophen, ice, heat and NSAIDs for the symptoms. The treatment provided mild relief. Past Medical History:     Past Medical History:   Diagnosis Date    Anemia     Chicken pox     Chronic back pain     Diabetes mellitus ([address]     Gastroesophageal reflux disease without esophagitis 12/7/2017    Headache     Hypertension     Measles     Mumps     Osteoarthritis     Whooping cough       Reviewed all health maintenance requirements and orderedappropriate tests  Health Maintenance Due   Topic Date Due    DTaP/Tdap/Td vaccine (1 - Tdap) 09/18/1953    Shingles Vaccine (1 of 2) 09/18/1992       Past Surgical History:     Past Surgical History:   Procedure Laterality Date    CARPAL TUNNEL RELEASE Left 2005    JOINT REPLACEMENT Right 2004    right knee    JOINT REPLACEMENT Left 2005    left knee    JOINT REPLACEMENT Right 2016    right knee    JOINT REPLACEMENT Right 2001    right hip    JOINT REPLACEMENT Left 2006    left hip    TOE SURGERY Left 2006    joint removed from 2nd toe        Medications:       Prior to Admission medications    Medication Sig Start Date End Date Taking?  Authorizing Provider   famotidine (PEPCID) 20 MG tablet Take 1 tablet by mouth nightly as needed (indigestion, stomach burning) 1/23/20  Yes Jaquelin Tucker MD   DULoxetine (CYMBALTA) 30 MG extended release capsule Take 1 capsule by mouth nightly 1/23/20  Yes Jaquelin Tucker MD   losartan (COZAAR) 25 MG tablet Take 1 tablet by mouth daily 1/23/20  Yes Jaquelin Tucker MD   atenolol (TENORMIN) 50 MG tablet Take 1 tablet by mouth 2 times daily 1/23/20  Yes Jaquelin Tucker MD   omeprazole (PRILOSEC) 20 MG delayed release capsule Take 1 capsule by mouth 2 times daily (before meals) 1/23/20  Yes Diana Best MD   traMADol (ULTRAM) 50 MG tablet Take 1 tablet by mouth every 4 hours as needed for Pain for up to 3 days. Intended supply: 3 days. Take lowest dose possible to manage pain 1/23/20 1/26/20 Yes Diana Best MD   lidocaine (XYLOCAINE) 5 % ointment Apply topically as needed.  1/23/20  Yes Diana Best MD   famotidine (PEPCID) 20 MG tablet Take 1 tablet by mouth 2 times daily 11/18/19  Yes Diana Best MD   DULoxetine (CYMBALTA) 60 MG extended release capsule Take 1 capsule by mouth daily 11/7/19  Yes Diana Best MD   glimepiride (AMARYL) 1 MG tablet Take 1 tablet by mouth 2 times daily 11/7/19  Yes Diana Best MD   diltiazem (CARTIA XT) 180 MG extended release capsule Take 1 capsule by mouth daily 11/7/19  Yes Diana Best MD   metFORMIN (GLUCOPHAGE) 500 MG tablet Take 1 tablet by mouth 2 times daily (with meals) 11/7/19  Yes Diana Best MD   blood glucose monitor strips Test Blood sugar Twice a day and as needed  Please disense to go with meter 7/19/19  Yes Diana Best MD   Lancets MISC 1 each by Does not apply route 2 times daily Pt test daily  DX E11.9  Please dispense lancets that goes with her meter 7/19/19  Yes Diana Best MD   Blood Glucose Monitoring Suppl (ONE TOUCH ULTRA 2) w/Device KIT 1 kit by Does not apply route daily 5/13/19  Yes Diana Best MD   diclofenac sodium 1 % GEL Apply 2 g topically 2 times daily 5/9/19  Yes Diana Best MD   acetaminophen (TYLENOL) 500 MG tablet Take 500 mg by mouth   Yes Historical Provider, MD   cetirizine (ZYRTEC) 10 MG tablet Take 10 mg by mouth daily   Yes Historical Provider, MD   aspirin 325 MG tablet Take 325 mg by mouth   Yes Historical Provider, MD   Calcium Polycarbophil (FIBER-CAPS PO) Take 5 capsules by mouth daily   Yes Historical Provider, MD   blood glucose monitor kit and supplies Please dispense on that is covered by her insurance tests daily  DX E11.9 11/8/18  Yes Fred Daigle MD   fluticasone (FLONASE) 50 MCG/ACT nasal spray 1 spray by Nasal route daily 6/11/18  Yes Fred Daigle MD   vitamin B-12 (CYANOCOBALAMIN) 1000 MCG tablet Take 1,000 mcg by mouth daily   Yes Historical Provider, MD   vitamin D (CHOLECALCIFEROL) 1000 UNIT TABS tablet Take 1,000 Units by mouth 2 times daily   Yes Historical Provider, MD   Glucosamine-Chondroit-Vit C-Mn (GLUCOSAMINE 1500 COMPLEX PO) Take 1 capsule by mouth daily   Yes Historical Provider, MD   docusate sodium (COLACE) 250 MG capsule Take 250 mg by mouth 2 times daily   Yes Historical Provider, MD   Multiple Vitamins-Minerals (THERAPEUTIC MULTIVITAMIN-MINERALS) tablet Take 1 tablet by mouth daily   Yes Historical Provider, MD        Allergies:       Penicillins; Sulfa antibiotics; and Aspirin    Social History:     Tobacco: reports that she has never smoked. She has never used smokeless tobacco.  Alcohol:      reports no history of alcohol use. Drug Use:  reports no history of drug use. Family History:     Family History   Problem Relation Age of Onset    High Blood Pressure Mother     Parkinsonism Mother     High Blood Pressure Father     Diabetes Father     Heart Attack Father     Diabetes Sister     High Blood Pressure Sister     Arrhythmia Sister     Diabetes Brother     High Blood Pressure Brother        Review of Systems:         Review of Systems   Constitutional: Negative for chills and fever. HENT: Negative for congestion, sore throat and trouble swallowing. Respiratory: Negative for cough and shortness of breath. Cardiovascular: Negative for chest pain, palpitations and syncope. Gastrointestinal: Negative for abdominal pain and nausea. Genitourinary: Negative for dysuria. Musculoskeletal: Positive for arthralgias and neck pain. Skin: Negative for rash. Neurological: Negative for dizziness, numbness and headaches. Psychiatric/Behavioral: The patient is not nervous/anxious. Judgment: Judgment normal.               Data:     Lab Results   Component Value Date     12/21/2019    K 4.7 12/21/2019    CL 98 12/21/2019    CO2 25 12/21/2019    BUN 19 12/21/2019    CREATININE 0.44 12/21/2019    CREATININE 0.7 09/08/2017    GLUCOSE 177 12/21/2019    PROT 7.1 12/21/2019    LABALBU 4.2 12/21/2019    BILITOT 1.08 12/21/2019    ALKPHOS 74 12/21/2019    AST 19 12/21/2019    ALT 16 12/21/2019     Lab Results   Component Value Date    WBC 7.2 12/21/2019    RBC 4.26 12/21/2019    HGB 13.8 12/21/2019    HCT 40.4 12/21/2019    MCV 94.8 12/21/2019    MCH 32.3 12/21/2019    MCHC 34.0 12/21/2019    RDW 12.6 12/21/2019     12/21/2019    MPV NOT REPORTED 12/21/2019     Lab Results   Component Value Date    TSH 2.99 12/21/2019     Lab Results   Component Value Date    CHOL 192 08/13/2019    HDL 65 08/13/2019    LABA1C 5.6 08/08/2019          Assessment & Plan        Diagnosis Orders   1. Chronic neck pain   Pain is due to severe multilevel DJD and spinal stenosis of the cervical spine  She has tried multiple meds and pain is persisting, interfering with sleep and ADLs. Advised to see Dr. Mena Velarde for possible steroid injection. Will prescribe Lidocaine topical ointment, Tramadol for prn use if pain severe. [place] was [place]  External Referral To Orthopedic Surgery    traMADol (ULTRAM) 50 MG tablet    lidocaine (XYLOCAINE) 5 % ointment   2. Type 2 diabetes mellitus without complication, without long-term current use of insulin ([facility])  famotidine (PEPCID) 20 MG tablet   3. Anxiety  DULoxetine (CYMBALTA) 30 MG extended release capsule   4. Essential hypertension  losartan (COZAAR) 25 MG tablet   5. Gastroesophageal reflux disease without esophagitis  omeprazole (PRILOSEC) 20 MG delayed release capsule   6.  Central spinal stenosis  External Referral To Orthopedic Surgery    traMADol (ULTRAM) 50 MG tablet                   Completed Refills   Requested Prescriptions     Signed Prescriptions Disp Refills  famotidine (PEPCID) 20 MG tablet 90 tablet 1     Sig: Take 1 tablet by mouth nightly as needed (indigestion, stomach burning)    DULoxetine (CYMBALTA) 30 MG extended release capsule 90 capsule 1     Sig: Take 1 capsule by mouth nightly    losartan (COZAAR) 25 MG tablet 90 tablet 1     Sig: Take 1 tablet by mouth daily    atenolol (TENORMIN) 50 MG tablet 180 tablet 1     Sig: Take 1 tablet by mouth 2 times daily    omeprazole (PRILOSEC) 20 MG delayed release capsule 180 capsule 1     Sig: Take 1 capsule by mouth 2 times daily (before meals)    traMADol (ULTRAM) 50 MG tablet 18 tablet 0     Sig: Take 1 tablet by mouth every 4 hours as needed for Pain for up to 3 days. Intended supply: 3 days. Take lowest dose possible to manage pain    lidocaine (XYLOCAINE) 5 % ointment 50 g 2     Sig: Apply topically as needed. Return if symptoms worsen or fail to improve. Orders Placed This Encounter   Medications    famotidine (PEPCID) 20 MG tablet     Sig: Take 1 tablet by mouth nightly as needed (indigestion, stomach burning)     Dispense:  90 tablet     Refill:  1    DULoxetine (CYMBALTA) 30 MG extended release capsule     Sig: Take 1 capsule by mouth nightly     Dispense:  90 capsule     Refill:  1    losartan (COZAAR) 25 MG tablet     Sig: Take 1 tablet by mouth daily     Dispense:  90 tablet     Refill:  1    atenolol (TENORMIN) 50 MG tablet     Sig: Take 1 tablet by mouth 2 times daily     Dispense:  180 tablet     Refill:  1    omeprazole (PRILOSEC) 20 MG delayed release capsule     Sig: Take 1 capsule by mouth 2 times daily (before meals)     Dispense:  180 capsule     Refill:  1    traMADol (ULTRAM) 50 MG tablet     Sig: Take 1 tablet by mouth every 4 hours as needed for Pain for up to 3 days. Intended supply: 3 days.  Take lowest dose possible to manage pain     Dispense:  18 tablet     Refill:  0     Reduce doses taken as pain becomes manageable    lidocaine (XYLOCAINE) 5 % ointment     Sig:

## 2020-02-10 RX ORDER — TRAMADOL HYDROCHLORIDE 50 MG/1
50 TABLET ORAL EVERY 8 HOURS PRN
Qty: 20 TABLET | Refills: 0 | Status: SHIPPED | OUTPATIENT
Start: 2020-02-10 | End: 2020-02-25 | Stop reason: SDUPTHER

## 2020-02-10 NOTE — TELEPHONE ENCOUNTER
Last OV: 1/23/2020  Last RX: 01/23/20   Next scheduled apt: 2/27/2020    TraMADol, pt daughter wondering if they can get a refill on this medication sent in.

## 2020-02-25 ENCOUNTER — TELEPHONE (OUTPATIENT)
Dept: FAMILY MEDICINE CLINIC | Age: 80
End: 2020-02-25

## 2020-02-25 RX ORDER — TRAMADOL HYDROCHLORIDE 50 MG/1
50 TABLET ORAL EVERY 8 HOURS PRN
Qty: 20 TABLET | Refills: 0 | Status: SHIPPED | OUTPATIENT
Start: 2020-02-25 | End: 2020-03-17 | Stop reason: SDUPTHER

## 2020-03-17 ENCOUNTER — TELEPHONE (OUTPATIENT)
Dept: FAMILY MEDICINE CLINIC | Age: 80
End: 2020-03-17

## 2020-03-17 RX ORDER — TRAMADOL HYDROCHLORIDE 50 MG/1
50 TABLET ORAL EVERY 8 HOURS PRN
Qty: 20 TABLET | Refills: 0 | Status: SHIPPED | OUTPATIENT
Start: 2020-03-17 | End: 2020-05-08 | Stop reason: SDUPTHER

## 2020-03-17 NOTE — TELEPHONE ENCOUNTER
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 Went to see Dr. Cris Pino cardiologist and she is cleared for surgery. They will do this after all is settled with Coronavirus.      Rx refill request    Tramadol    [facility] Maintenance   Topic Date Due    Hepatitis B vaccine (1 of 3 - Risk 3-dose series) 09/18/1959    DTaP/Tdap/Td vaccine (1 - Tdap) 09/18/1961    Shingles Vaccine (1 of 2) 09/18/1992    Annual Wellness Visit (AWV)  07/29/2020    Potassium monitoring  12/21/2020    Creatinine monitoring  12/21/2020    DEXA (modify frequency per FRAX score)  Completed    Flu vaccine  Completed    Pneumococcal 65+ years Vaccine  Completed    Hepatitis A vaccine  Aged Out    Hib vaccine  Aged Out    Meningococcal (ACWY) vaccine  Aged Out             (applicable per patient's age: Cancer Screenings, Depression Screening, Fall Risk Screening, Immunizations)    Hemoglobin A1C (%)   Date Value   08/08/2019 5.6   11/08/2018 6.3   02/06/2018 6.4     LDL Calculated (mg/dL)   Date Value   08/13/2019 105     AST (U/L)   Date Value   12/21/2019 19     ALT (U/L)   Date Value   12/21/2019 16     BUN (mg/dL)   Date Value   12/21/2019 19      (goal A1C is < 7)   (goal LDL is <100) need 30-50% reduction from baseline     BP Readings from Last 3 Encounters:   01/23/20 136/70   12/21/19 (!) 173/69   11/15/19 (!) 150/60    (goal /80)      All Future Testing planned in [facility]:      Next Visit Date:  Future Appointments   Date Time Provider Brandi Arias   4/6/2020  2:30 PM Nubia Gatica MD [facility]            Patient Active Problem List:     Essential hypertension     Gastroesophageal reflux disease without esophagitis     Type 2 diabetes mellitus without complication (HCC)     Chronic pain of both shoulders     Anxiety     Atrial fibrillation with RVR (HCC)     Nonexudative age-related macular degeneration, bilateral, early dry stage

## 2020-05-08 RX ORDER — TRAMADOL HYDROCHLORIDE 50 MG/1
50 TABLET ORAL EVERY 8 HOURS PRN
Qty: 20 TABLET | Refills: 0 | Status: SHIPPED | OUTPATIENT
Start: 2020-05-08 | End: 2020-06-29 | Stop reason: SDUPTHER

## 2020-05-27 ENCOUNTER — HOSPITAL ENCOUNTER (EMERGENCY)
Age: 80
Discharge: ANOTHER HOSPITAL | End: 2020-05-28
Attending: FAMILY MEDICINE
Payer: MEDICARE

## 2020-05-27 ENCOUNTER — APPOINTMENT (OUTPATIENT)
Dept: GENERAL RADIOLOGY | Age: 80
End: 2020-05-27
Payer: MEDICARE

## 2020-05-27 LAB
-: NORMAL
ABSOLUTE EOS #: 0.1 K/UL (ref 0–0.4)
ABSOLUTE IMMATURE GRANULOCYTE: ABNORMAL K/UL (ref 0–0.3)
ABSOLUTE LYMPH #: 2.3 K/UL (ref 1–4.8)
ABSOLUTE MONO #: 0.7 K/UL (ref 0–1)
ALBUMIN SERPL-MCNC: 4.4 G/DL (ref 3.5–5.2)
ALBUMIN/GLOBULIN RATIO: ABNORMAL (ref 1–2.5)
ALP BLD-CCNC: 115 U/L (ref 35–104)
ALT SERPL-CCNC: 16 U/L (ref 5–33)
AMORPHOUS: NORMAL
ANION GAP SERPL CALCULATED.3IONS-SCNC: 13 MMOL/L (ref 9–17)
AST SERPL-CCNC: 22 U/L
BACTERIA: NORMAL
BASOPHILS # BLD: 0 % (ref 0–2)
BASOPHILS ABSOLUTE: 0 K/UL (ref 0–0.2)
BILIRUB SERPL-MCNC: 0.54 MG/DL (ref 0.3–1.2)
BILIRUBIN URINE: NEGATIVE
BUN BLDV-MCNC: 33 MG/DL (ref 8–23)
BUN/CREAT BLD: 56 (ref 9–20)
CALCIUM SERPL-MCNC: 9.9 MG/DL (ref 8.6–10.4)
CASTS UA: NORMAL /LPF
CHLORIDE BLD-SCNC: 98 MMOL/L (ref 98–107)
CO2: 23 MMOL/L (ref 20–31)
COLOR: YELLOW
COMMENT UA: ABNORMAL
CREAT SERPL-MCNC: 0.59 MG/DL (ref 0.5–0.9)
CRYSTALS, UA: NORMAL /HPF
DIFFERENTIAL TYPE: YES
EOSINOPHILS RELATIVE PERCENT: 1 % (ref 0–5)
EPITHELIAL CELLS UA: NORMAL /HPF
GFR AFRICAN AMERICAN: >60 ML/MIN
GFR NON-AFRICAN AMERICAN: >60 ML/MIN
GFR SERPL CREATININE-BSD FRML MDRD: ABNORMAL ML/MIN/{1.73_M2}
GFR SERPL CREATININE-BSD FRML MDRD: ABNORMAL ML/MIN/{1.73_M2}
GLUCOSE BLD-MCNC: 236 MG/DL (ref 70–99)
GLUCOSE URINE: NEGATIVE
HCT VFR BLD CALC: 41 % (ref 36–46)
HEMOGLOBIN: 14 G/DL (ref 12–16)
IMMATURE GRANULOCYTES: ABNORMAL %
KETONES, URINE: NEGATIVE
LACTIC ACID, WHOLE BLOOD: ABNORMAL MMOL/L (ref 0.7–2.1)
LACTIC ACID: 2.8 MMOL/L (ref 0.5–2.2)
LEUKOCYTE ESTERASE, URINE: ABNORMAL
LYMPHOCYTES # BLD: 22 % (ref 15–40)
MCH RBC QN AUTO: 32.1 PG (ref 26–34)
MCHC RBC AUTO-ENTMCNC: 34.1 G/DL (ref 31–37)
MCV RBC AUTO: 94.2 FL (ref 80–100)
MONOCYTES # BLD: 7 % (ref 4–8)
MUCUS: NORMAL
NITRITE, URINE: NEGATIVE
NRBC AUTOMATED: ABNORMAL PER 100 WBC
OTHER OBSERVATIONS UA: NORMAL
PDW BLD-RTO: 12.1 % (ref 12.1–15.2)
PH UA: 7 (ref 5–8)
PLATELET # BLD: 275 K/UL (ref 140–450)
PLATELET ESTIMATE: ABNORMAL
PMV BLD AUTO: ABNORMAL FL (ref 6–12)
POTASSIUM SERPL-SCNC: 4.4 MMOL/L (ref 3.7–5.3)
PROTEIN UA: ABNORMAL
RBC # BLD: 4.36 M/UL (ref 4–5.2)
RBC # BLD: ABNORMAL 10*6/UL
RBC UA: NORMAL /HPF (ref 0–2)
RENAL EPITHELIAL, UA: NORMAL /HPF
SEG NEUTROPHILS: 70 % (ref 47–75)
SEGMENTED NEUTROPHILS ABSOLUTE COUNT: 7.1 K/UL (ref 2.5–7)
SODIUM BLD-SCNC: 134 MMOL/L (ref 135–144)
SPECIFIC GRAVITY UA: 1.01 (ref 1–1.03)
TOTAL PROTEIN: 7.1 G/DL (ref 6.4–8.3)
TRICHOMONAS: NORMAL
TROPONIN INTERP: NORMAL
TROPONIN T: <0.03 NG/ML
TROPONIN, HIGH SENSITIVITY: NORMAL NG/L (ref 0–14)
TURBIDITY: CLEAR
URINE HGB: ABNORMAL
UROBILINOGEN, URINE: NORMAL
WBC # BLD: 10.2 K/UL (ref 3.5–11)
WBC # BLD: ABNORMAL 10*3/UL
WBC UA: NORMAL /HPF
YEAST: NORMAL

## 2020-05-27 PROCEDURE — 6370000000 HC RX 637 (ALT 250 FOR IP): Performed by: FAMILY MEDICINE

## 2020-05-27 PROCEDURE — 36415 COLL VENOUS BLD VENIPUNCTURE: CPT

## 2020-05-27 PROCEDURE — 71045 X-RAY EXAM CHEST 1 VIEW: CPT

## 2020-05-27 PROCEDURE — 83605 ASSAY OF LACTIC ACID: CPT

## 2020-05-27 PROCEDURE — 81001 URINALYSIS AUTO W/SCOPE: CPT

## 2020-05-27 PROCEDURE — U0002 COVID-19 LAB TEST NON-CDC: HCPCS

## 2020-05-27 PROCEDURE — 84484 ASSAY OF TROPONIN QUANT: CPT

## 2020-05-27 PROCEDURE — 99285 EMERGENCY DEPT VISIT HI MDM: CPT

## 2020-05-27 PROCEDURE — 93005 ELECTROCARDIOGRAM TRACING: CPT | Performed by: FAMILY MEDICINE

## 2020-05-27 PROCEDURE — 2580000003 HC RX 258: Performed by: FAMILY MEDICINE

## 2020-05-27 PROCEDURE — 87086 URINE CULTURE/COLONY COUNT: CPT

## 2020-05-27 PROCEDURE — 80053 COMPREHEN METABOLIC PANEL: CPT

## 2020-05-27 PROCEDURE — 6360000002 HC RX W HCPCS: Performed by: FAMILY MEDICINE

## 2020-05-27 PROCEDURE — 87040 BLOOD CULTURE FOR BACTERIA: CPT

## 2020-05-27 PROCEDURE — 96365 THER/PROPH/DIAG IV INF INIT: CPT

## 2020-05-27 PROCEDURE — 85025 COMPLETE CBC W/AUTO DIFF WBC: CPT

## 2020-05-27 RX ORDER — TRAMADOL HYDROCHLORIDE 50 MG/1
100 TABLET ORAL ONCE
Status: COMPLETED | OUTPATIENT
Start: 2020-05-27 | End: 2020-05-27

## 2020-05-27 RX ORDER — TRAMADOL HYDROCHLORIDE 50 MG/1
50 TABLET ORAL EVERY 8 HOURS PRN
Status: ON HOLD | COMMUNITY
End: 2020-05-28

## 2020-05-27 RX ADMIN — CEFTRIAXONE SODIUM 1 G: 1 INJECTION, POWDER, FOR SOLUTION INTRAMUSCULAR; INTRAVENOUS at 22:49

## 2020-05-27 RX ADMIN — TRAMADOL HYDROCHLORIDE 100 MG: 50 TABLET, FILM COATED ORAL at 22:56

## 2020-05-27 ASSESSMENT — PAIN SCALES - GENERAL: PAINLEVEL_OUTOF10: 0

## 2020-05-28 ENCOUNTER — HOSPITAL ENCOUNTER (INPATIENT)
Age: 80
LOS: 8 days | Discharge: NURSING, REHAB OR LONG-TERM CARE | DRG: 871 | End: 2020-06-05
Attending: INTERNAL MEDICINE | Admitting: INTERNAL MEDICINE
Payer: MEDICARE

## 2020-05-28 VITALS
OXYGEN SATURATION: 97 % | SYSTOLIC BLOOD PRESSURE: 176 MMHG | RESPIRATION RATE: 22 BRPM | TEMPERATURE: 100 F | HEIGHT: 68 IN | BODY MASS INDEX: 29.1 KG/M2 | WEIGHT: 192 LBS | HEART RATE: 81 BPM | DIASTOLIC BLOOD PRESSURE: 76 MMHG

## 2020-05-28 PROBLEM — R41.0 DELIRIUM: Status: ACTIVE | Noted: 2020-05-28

## 2020-05-28 LAB
-: NORMAL
AMORPHOUS: NORMAL
BACTERIA: NORMAL
BILIRUBIN URINE: NEGATIVE
CASTS UA: NORMAL /LPF
COLOR: YELLOW
COMMENT UA: ABNORMAL
CRYSTALS, UA: NORMAL /HPF
EKG ATRIAL RATE: 80 BPM
EKG P AXIS: 54 DEGREES
EKG P-R INTERVAL: 138 MS
EKG Q-T INTERVAL: 424 MS
EKG QRS DURATION: 108 MS
EKG QTC CALCULATION (BAZETT): 489 MS
EKG R AXIS: 48 DEGREES
EKG T AXIS: 71 DEGREES
EKG VENTRICULAR RATE: 80 BPM
EPITHELIAL CELLS UA: NORMAL /HPF
GLUCOSE BLD-MCNC: 124 MG/DL (ref 65–105)
GLUCOSE URINE: NEGATIVE
KETONES, URINE: NEGATIVE
LEUKOCYTE ESTERASE, URINE: NEGATIVE
MUCUS: NORMAL
NITRITE, URINE: NEGATIVE
OTHER OBSERVATIONS UA: NORMAL
PH UA: 7.5 (ref 5–8)
PROTEIN UA: NEGATIVE
RBC UA: NORMAL /HPF
RENAL EPITHELIAL, UA: NORMAL /HPF
SARS-COV-2, PCR: NORMAL
SARS-COV-2, RAPID: NOT DETECTED
SARS-COV-2: NORMAL
SOURCE: NORMAL
SPECIFIC GRAVITY UA: 1.01 (ref 1–1.03)
TRICHOMONAS: NORMAL
TURBIDITY: CLEAR
URINE HGB: ABNORMAL
UROBILINOGEN, URINE: NORMAL
WBC UA: NORMAL /HPF
YEAST: NORMAL

## 2020-05-28 PROCEDURE — 93010 ELECTROCARDIOGRAM REPORT: CPT | Performed by: INTERNAL MEDICINE

## 2020-05-28 PROCEDURE — 6360000002 HC RX W HCPCS: Performed by: INTERNAL MEDICINE

## 2020-05-28 PROCEDURE — 96375 TX/PRO/DX INJ NEW DRUG ADDON: CPT

## 2020-05-28 PROCEDURE — 51702 INSERT TEMP BLADDER CATH: CPT

## 2020-05-28 PROCEDURE — 99222 1ST HOSP IP/OBS MODERATE 55: CPT | Performed by: PSYCHIATRY & NEUROLOGY

## 2020-05-28 PROCEDURE — 81001 URINALYSIS AUTO W/SCOPE: CPT

## 2020-05-28 PROCEDURE — 2580000003 HC RX 258: Performed by: INTERNAL MEDICINE

## 2020-05-28 PROCEDURE — 6360000002 HC RX W HCPCS: Performed by: PHYSICIAN ASSISTANT

## 2020-05-28 PROCEDURE — 1200000000 HC SEMI PRIVATE

## 2020-05-28 PROCEDURE — 51798 US URINE CAPACITY MEASURE: CPT

## 2020-05-28 PROCEDURE — 6360000002 HC RX W HCPCS: Performed by: FAMILY MEDICINE

## 2020-05-28 PROCEDURE — 2580000003 HC RX 258

## 2020-05-28 PROCEDURE — 2580000003 HC RX 258: Performed by: PHYSICIAN ASSISTANT

## 2020-05-28 PROCEDURE — 99223 1ST HOSP IP/OBS HIGH 75: CPT | Performed by: INTERNAL MEDICINE

## 2020-05-28 PROCEDURE — 96372 THER/PROPH/DIAG INJ SC/IM: CPT

## 2020-05-28 PROCEDURE — 2500000003 HC RX 250 WO HCPCS: Performed by: FAMILY MEDICINE

## 2020-05-28 PROCEDURE — 82947 ASSAY GLUCOSE BLOOD QUANT: CPT

## 2020-05-28 PROCEDURE — G0378 HOSPITAL OBSERVATION PER HR: HCPCS

## 2020-05-28 PROCEDURE — G0379 DIRECT REFER HOSPITAL OBSERV: HCPCS

## 2020-05-28 PROCEDURE — 2580000003 HC RX 258: Performed by: FAMILY MEDICINE

## 2020-05-28 RX ORDER — LORAZEPAM 2 MG/ML
1 INJECTION INTRAMUSCULAR EVERY 4 HOURS PRN
Status: DISCONTINUED | OUTPATIENT
Start: 2020-05-28 | End: 2020-06-01

## 2020-05-28 RX ORDER — HALOPERIDOL 5 MG/ML
5 INJECTION INTRAMUSCULAR ONCE
Status: COMPLETED | OUTPATIENT
Start: 2020-05-28 | End: 2020-05-28

## 2020-05-28 RX ORDER — LISINOPRIL 10 MG/1
10 TABLET ORAL ONCE
Status: DISCONTINUED | OUTPATIENT
Start: 2020-05-28 | End: 2020-05-28

## 2020-05-28 RX ORDER — LORAZEPAM 2 MG/ML
1 INJECTION INTRAMUSCULAR EVERY 4 HOURS PRN
Status: DISCONTINUED | OUTPATIENT
Start: 2020-05-28 | End: 2020-05-28

## 2020-05-28 RX ORDER — DULOXETIN HYDROCHLORIDE 30 MG/1
30 CAPSULE, DELAYED RELEASE ORAL NIGHTLY
Status: DISCONTINUED | OUTPATIENT
Start: 2020-05-28 | End: 2020-06-05 | Stop reason: HOSPADM

## 2020-05-28 RX ORDER — 0.9 % SODIUM CHLORIDE 0.9 %
500 INTRAVENOUS SOLUTION INTRAVENOUS ONCE
Status: COMPLETED | OUTPATIENT
Start: 2020-05-28 | End: 2020-05-28

## 2020-05-28 RX ORDER — HALOPERIDOL 5 MG/ML
2 INJECTION INTRAMUSCULAR EVERY 6 HOURS PRN
Status: DISCONTINUED | OUTPATIENT
Start: 2020-05-28 | End: 2020-06-05 | Stop reason: HOSPADM

## 2020-05-28 RX ORDER — M-VIT,TX,IRON,MINS/CALC/FOLIC 27MG-0.4MG
1 TABLET ORAL DAILY
Status: DISCONTINUED | OUTPATIENT
Start: 2020-05-28 | End: 2020-06-05 | Stop reason: HOSPADM

## 2020-05-28 RX ORDER — FAMOTIDINE 20 MG/1
20 TABLET, FILM COATED ORAL NIGHTLY PRN
Status: DISCONTINUED | OUTPATIENT
Start: 2020-05-28 | End: 2020-06-05 | Stop reason: HOSPADM

## 2020-05-28 RX ORDER — PROMETHAZINE HYDROCHLORIDE 25 MG/1
12.5 TABLET ORAL EVERY 6 HOURS PRN
Status: DISCONTINUED | OUTPATIENT
Start: 2020-05-28 | End: 2020-06-05 | Stop reason: HOSPADM

## 2020-05-28 RX ORDER — SODIUM CHLORIDE 9 MG/ML
INJECTION, SOLUTION INTRAVENOUS CONTINUOUS
Status: DISCONTINUED | OUTPATIENT
Start: 2020-05-28 | End: 2020-06-01

## 2020-05-28 RX ORDER — DEXTROSE MONOHYDRATE 25 G/50ML
12.5 INJECTION, SOLUTION INTRAVENOUS PRN
Status: DISCONTINUED | OUTPATIENT
Start: 2020-05-28 | End: 2020-06-05 | Stop reason: HOSPADM

## 2020-05-28 RX ORDER — DEXTROSE MONOHYDRATE 50 MG/ML
100 INJECTION, SOLUTION INTRAVENOUS PRN
Status: DISCONTINUED | OUTPATIENT
Start: 2020-05-28 | End: 2020-06-05 | Stop reason: HOSPADM

## 2020-05-28 RX ORDER — ACETAMINOPHEN 325 MG/1
650 TABLET ORAL EVERY 6 HOURS PRN
Status: DISCONTINUED | OUTPATIENT
Start: 2020-05-28 | End: 2020-06-05 | Stop reason: HOSPADM

## 2020-05-28 RX ORDER — LORAZEPAM 2 MG/ML
0.5 INJECTION INTRAMUSCULAR
Status: DISCONTINUED | OUTPATIENT
Start: 2020-05-28 | End: 2020-05-29

## 2020-05-28 RX ORDER — LORAZEPAM 2 MG/ML
1 INJECTION INTRAMUSCULAR ONCE
Status: COMPLETED | OUTPATIENT
Start: 2020-05-28 | End: 2020-05-28

## 2020-05-28 RX ORDER — ATENOLOL 50 MG/1
50 TABLET ORAL 2 TIMES DAILY
Status: DISCONTINUED | OUTPATIENT
Start: 2020-05-28 | End: 2020-05-29

## 2020-05-28 RX ORDER — OLANZAPINE 10 MG/1
10 INJECTION, POWDER, LYOPHILIZED, FOR SOLUTION INTRAMUSCULAR
Status: COMPLETED | OUTPATIENT
Start: 2020-05-28 | End: 2020-05-28

## 2020-05-28 RX ORDER — ACETAMINOPHEN 650 MG/1
650 SUPPOSITORY RECTAL EVERY 6 HOURS PRN
Status: DISCONTINUED | OUTPATIENT
Start: 2020-05-28 | End: 2020-06-05 | Stop reason: HOSPADM

## 2020-05-28 RX ORDER — HYDRALAZINE HYDROCHLORIDE 20 MG/ML
5 INJECTION INTRAMUSCULAR; INTRAVENOUS ONCE
Status: COMPLETED | OUTPATIENT
Start: 2020-05-28 | End: 2020-05-28

## 2020-05-28 RX ORDER — HYDRALAZINE HYDROCHLORIDE 20 MG/ML
10 INJECTION INTRAMUSCULAR; INTRAVENOUS EVERY 6 HOURS PRN
Status: DISCONTINUED | OUTPATIENT
Start: 2020-05-28 | End: 2020-06-05 | Stop reason: HOSPADM

## 2020-05-28 RX ORDER — POLYETHYLENE GLYCOL 3350 17 G/17G
17 POWDER, FOR SOLUTION ORAL DAILY PRN
Status: DISCONTINUED | OUTPATIENT
Start: 2020-05-28 | End: 2020-06-05 | Stop reason: HOSPADM

## 2020-05-28 RX ORDER — SODIUM CHLORIDE 0.9 % (FLUSH) 0.9 %
10 SYRINGE (ML) INJECTION PRN
Status: DISCONTINUED | OUTPATIENT
Start: 2020-05-28 | End: 2020-06-05 | Stop reason: HOSPADM

## 2020-05-28 RX ORDER — ONDANSETRON 2 MG/ML
4 INJECTION INTRAMUSCULAR; INTRAVENOUS EVERY 6 HOURS PRN
Status: DISCONTINUED | OUTPATIENT
Start: 2020-05-28 | End: 2020-06-05 | Stop reason: HOSPADM

## 2020-05-28 RX ORDER — NICOTINE POLACRILEX 4 MG
15 LOZENGE BUCCAL PRN
Status: DISCONTINUED | OUTPATIENT
Start: 2020-05-28 | End: 2020-06-05 | Stop reason: HOSPADM

## 2020-05-28 RX ORDER — SODIUM CHLORIDE 0.9 % (FLUSH) 0.9 %
10 SYRINGE (ML) INJECTION EVERY 12 HOURS SCHEDULED
Status: DISCONTINUED | OUTPATIENT
Start: 2020-05-28 | End: 2020-06-05 | Stop reason: HOSPADM

## 2020-05-28 RX ADMIN — OLANZAPINE 10 MG: 10 INJECTION, POWDER, FOR SOLUTION INTRAMUSCULAR at 00:56

## 2020-05-28 RX ADMIN — HALOPERIDOL 5 MG: 5 INJECTION INTRAMUSCULAR at 03:52

## 2020-05-28 RX ADMIN — OLANZAPINE 10 MG: 10 INJECTION, POWDER, FOR SOLUTION INTRAMUSCULAR at 02:37

## 2020-05-28 RX ADMIN — WATER: 1 INJECTION INTRAMUSCULAR; INTRAVENOUS; SUBCUTANEOUS at 05:03

## 2020-05-28 RX ADMIN — HYDRALAZINE HYDROCHLORIDE 10 MG: 20 INJECTION, SOLUTION INTRAMUSCULAR; INTRAVENOUS at 20:46

## 2020-05-28 RX ADMIN — LORAZEPAM 0.5 MG: 2 INJECTION INTRAMUSCULAR; INTRAVENOUS at 21:44

## 2020-05-28 RX ADMIN — SODIUM CHLORIDE: 9 INJECTION, SOLUTION INTRAVENOUS at 22:38

## 2020-05-28 RX ADMIN — HALOPERIDOL 5 MG: 5 INJECTION INTRAMUSCULAR at 04:24

## 2020-05-28 RX ADMIN — SODIUM CHLORIDE 500 ML: 9 INJECTION, SOLUTION INTRAVENOUS at 00:26

## 2020-05-28 RX ADMIN — HALOPERIDOL LACTATE 2 MG: 5 INJECTION, SOLUTION INTRAMUSCULAR at 20:10

## 2020-05-28 RX ADMIN — LORAZEPAM 0.5 MG: 2 INJECTION INTRAMUSCULAR; INTRAVENOUS at 13:32

## 2020-05-28 RX ADMIN — WATER 10 ML: 1 INJECTION INTRAMUSCULAR; INTRAVENOUS; SUBCUTANEOUS at 00:56

## 2020-05-28 RX ADMIN — SODIUM CHLORIDE: 9 INJECTION, SOLUTION INTRAVENOUS at 13:32

## 2020-05-28 RX ADMIN — LORAZEPAM 0.5 MG: 2 INJECTION INTRAMUSCULAR; INTRAVENOUS at 11:30

## 2020-05-28 RX ADMIN — ENOXAPARIN SODIUM 30 MG: 30 INJECTION SUBCUTANEOUS at 21:44

## 2020-05-28 RX ADMIN — CEFTRIAXONE SODIUM 1 G: 1 INJECTION, POWDER, FOR SOLUTION INTRAMUSCULAR; INTRAVENOUS at 22:44

## 2020-05-28 RX ADMIN — LORAZEPAM 1 MG: 2 INJECTION, SOLUTION INTRAMUSCULAR; INTRAVENOUS at 00:25

## 2020-05-28 RX ADMIN — ENOXAPARIN SODIUM 30 MG: 30 INJECTION SUBCUTANEOUS at 15:02

## 2020-05-28 RX ADMIN — HYDRALAZINE HYDROCHLORIDE 5 MG: 20 INJECTION INTRAMUSCULAR; INTRAVENOUS at 00:56

## 2020-05-28 RX ADMIN — HALOPERIDOL LACTATE 2 MG: 5 INJECTION, SOLUTION INTRAMUSCULAR at 07:29

## 2020-05-28 RX ADMIN — LORAZEPAM 1 MG: 2 INJECTION INTRAMUSCULAR; INTRAVENOUS at 19:58

## 2020-05-28 RX ADMIN — LORAZEPAM 1 MG: 2 INJECTION INTRAMUSCULAR; INTRAVENOUS at 09:23

## 2020-05-28 RX ADMIN — LORAZEPAM 1 MG: 2 INJECTION INTRAMUSCULAR; INTRAVENOUS at 23:56

## 2020-05-28 NOTE — ED PROVIDER NOTES
eMERGENCY dEPARTMENT eNCOUnter        [address] [facility]    Chief Complaint   Patient presents with    Altered Mental Status     Patient arrives to ER today by [facility] with complaints of increased confusion that began at 1430 today. EMS report that patient has a hx of UTI which has caused AMS in the past. Pt arrives agitated oriented to self only and uncoooperative. HPI    Celi Fournier is a 70 y.o. female who presents by EMS because of increased confusion and agitation starting at 2:30 PM today. Her daughter is her caregiver. With this change she is hard to control at home and may have UTI according to the daughter. No apparent injury or trauma. No recent fever. History of dementia. Sudden changes noted today. No recent change in medication. Takes tramadol for pain. REVIEW OF SYSTEMS    All body systems reviewed. Pertinent positive and negative findings are mentioned in the HPI with her daughter being the main historian. PAST MEDICAL HISTORY    Past Medical History:   Diagnosis Date    Anemia     Chicken pox     Chronic back pain     Dementia ([address]     Diabetes mellitus ([address]     Gastroesophageal reflux disease without esophagitis 12/7/2017    Headache     Hypertension     Measles     Mumps     Osteoarthritis     Whooping cough        SURGICAL HISTORY    Past Surgical History:   Procedure Laterality Date    CARPAL TUNNEL RELEASE Left 2005    JOINT REPLACEMENT Right 2004    right knee    JOINT REPLACEMENT Left 2005    left knee    JOINT REPLACEMENT Right 2016    right knee    JOINT REPLACEMENT Right 2001    right hip    JOINT REPLACEMENT Left 2006    left hip    TOE SURGERY Left 2006    joint removed from 2nd toe       CURRENT MEDICATIONS    Current Outpatient Rx   Medication Sig Dispense Refill    traMADol (ULTRAM) 50 MG tablet Take 50 mg by mouth every 8 hours as needed for Pain.  Pain      metFORMIN (GLUCOPHAGE) 500 MG tablet Take 1 tablet by mouth 2 times daily (with Please dispense on that is covered by her insurance tests daily  DX E11.9 1 kit 0       ALLERGIES    Allergies   Allergen Reactions    Penicillins Hives    Sulfa Antibiotics Hives    Aspirin Other (See Comments)     Nose bleeds in high doses         FAMILY HISTORY    Family History   Problem Relation Age of Onset    High Blood Pressure Mother     Parkinsonism Mother     High Blood Pressure Father     Diabetes Father     Heart Attack Father     Diabetes Sister     High Blood Pressure Sister     Arrhythmia Sister     Diabetes Brother     High Blood Pressure Brother        SOCIAL HISTORY    Social History     Socioeconomic History    Marital status:      Spouse name: None    Number of children: None    Years of education: None    Highest education level: None   Occupational History    None   Social Needs    Financial resource strain: None    Food insecurity     Worry: None     Inability: None    Transportation needs     Medical: None     Non-medical: None   Tobacco Use    Smoking status: Never Smoker    Smokeless tobacco: Never Used   Substance and Sexual Activity    Alcohol use: No    Drug use: No    Sexual activity: None   Lifestyle    Physical activity     Days per week: None     Minutes per session: None    Stress: None   Relationships    Social connections     Talks on phone: None     Gets together: None     Attends Yarsani service: None     Active member of club or organization: None     Attends meetings of clubs or organizations: None     Relationship status: None    Intimate partner violence     Fear of current or ex partner: None     Emotionally abused: None     Physically abused: None     Forced sexual activity: None   Other Topics Concern    None   Social History Narrative    None       PHYSICAL EXAM    VITAL SIGNS: BP (!) 193/73   Pulse 81   Temp 100 °F (37.8 °C) (Axillary)   Resp 22   Ht 5' 8\" (1.727 m)   Wt 192 lb (87.1 kg)   SpO2 97%   BMI 29.19 kg/m²

## 2020-05-28 NOTE — CONSULTS
[facility]                       NEUROLOGY CONSULT NOTE    PATIENT NAME: German Rowland   PATIENT MRN: [id number]   PRIMARY CARE PHYSICIAN: Melquiades Lopez MD  CONSULT REQUESTED BY: Consults  DATE OF SERVICE: 5/28/2020    CHIEF COMPLAINT: No chief complaint on file. HPI: German Rowland is a 70year old WF who was admitted for delirium. Pt has baseline dementia but not sure how severe it is. Per sitter at bedside, pt has had at least 24 hours not slept. Pt just got ativan for zapata placement, then 750 cc urine got out. Pt currently quietly in sleep. Pt does have stroke risk factors, she had afib s/p cardioversion in 4/2018. She has been followed with cardiology, last visit was in 3/2020, per note at that time, pt had falls, so Eliquis was discontinued. Currently on aspirin only. UA yesterday 1+ leukocyte esterase, . BUN 33, creatinine 0.59.      No head imaging on this admission       PAST MEDICAL HISTORY:         Diagnosis Date    Anemia     Chicken pox     Chronic back pain     Dementia ([address]     Diabetes mellitus ([address]     Gastroesophageal reflux disease without esophagitis 12/7/2017    Headache     Hypertension     Measles     Mumps     Osteoarthritis     Whooping cough         PAST SURGICAL HISTORY:         Procedure Laterality Date    CARPAL TUNNEL RELEASE Left 2005    JOINT REPLACEMENT Right 2004    right knee    JOINT REPLACEMENT Left 2005    left knee    JOINT REPLACEMENT Right 2016    right knee    JOINT REPLACEMENT Right 2001    right hip    JOINT REPLACEMENT Left 2006    left hip    TOE SURGERY Left 2006    joint removed from 2nd toe        SOCAIL HISTORY:     Social History     Socioeconomic History    Marital status:      Spouse name: Not on file    Number of children: Not on file    Years of education: Not on file    Highest education level: Not on file   Occupational

## 2020-05-28 NOTE — H&P
Historical Provider, MD   Glucosamine-Chondroit-Vit C-Mn (GLUCOSAMINE 1500 COMPLEX PO) Take 1 capsule by mouth daily    Historical Provider, MD   docusate sodium (COLACE) 250 MG capsule Take 250 mg by mouth 2 times daily    Historical Provider, MD   Multiple Vitamins-Minerals (THERAPEUTIC MULTIVITAMIN-MINERALS) tablet Take 1 tablet by mouth daily    Historical Provider, MD        Allergies:     Penicillins; Sulfa antibiotics; and Aspirin    Social History:     Tobacco:    reports that she has never smoked. She has never used smokeless tobacco.  Alcohol:      reports no history of alcohol use. Drug Use:  reports no history of drug use. Family History:     Family History   Problem Relation Age of Onset    High Blood Pressure Mother     Parkinsonism Mother     High Blood Pressure Father     Diabetes Father     Heart Attack Father     Diabetes Sister     High Blood Pressure Sister     Arrhythmia Sister     Diabetes Brother     High Blood Pressure Brother        Review of Systems:     Positive and Negative as described in HPI. CONSTITUTIONAL:  negative for fevers, chills, sweats, fatigue, weight loss  HEENT:  negative for vision, hearing changes, runny nose, throat pain  RESPIRATORY:  negative for shortness of breath, cough, congestion, wheezing. CARDIOVASCULAR:  negative for chest pain, palpitations.   GASTROINTESTINAL:  negative for nausea, vomiting, diarrhea, constipation, change in bowel habits, abdominal pain   GENITOURINARY:  negative for difficulty of urination, burning with urination, frequency   INTEGUMENT:  negative for rash, skin lesions, easy bruising   HEMATOLOGIC/LYMPHATIC:  negative for swelling/edema   ALLERGIC/IMMUNOLOGIC:  negative for urticaria , itching  ENDOCRINE:  negative increase in drinking, increase in urination, hot or cold intolerance  MUSCULOSKELETAL:  negative joint pains, muscle aches, swelling of joints  NEUROLOGICAL:  negative for headaches, dizziness, lightheadedness, numbness, pain, tingling extremities  BEHAVIOR/PSYCH:  negative for depression, anxiety    Physical Exam:   BP (!) 151/70   Pulse 95   Temp 99.2 °F (37.3 °C) (Axillary)   Resp 23   Wt 242 lb 8.1 oz (110 kg)   SpO2 97%   BMI 36.87 kg/m²   Recent Labs     05/28/20  1111   POCGLU 124*       Mental status: Disoriented to time place and person agitated head:  normocephalic, atraumatic. Eye: no icterus, redness, pupils equal and reactive, extraocular eye movements intact, conjunctiva clear  Ear: normal external ear, no discharge, hearing intact  Nose:  no drainage noted  Mouth: mucous membranes moist  Neck: supple, no carotid bruits, thyroid not palpable  Lungs: Bilateral equal air entry, clear to ausculation, no wheezing, rales or rhonchi, normal effort  Cardiovascular: normal rate, regular rhythm, no murmur, gallop, rub.   Abdomen: Soft, nontender, nondistended, normal bowel sounds, no hepatomegaly or splenomegaly  Neurologic: There are no new focal motor or sensory deficits, normal muscle tone and bulk, no abnormal sensation, normal speech,  Skin: No gross lesions, rashes, bruising or bleeding on exposed skin area  Extremities:  peripheral pulses palpable, no pedal edema or calf pain with palpation  Psych agitated not alert oriented and delirium    Investigations:      Laboratory Testing:  Recent Results (from the past 24 hour(s))   CBC Auto Differential    Collection Time: 05/27/20  9:30 PM   Result Value Ref Range    WBC 10.2 3.5 - 11.0 k/uL    RBC 4.36 4.0 - 5.2 m/uL    Hemoglobin 14.0 12.0 - 16.0 g/dL    Hematocrit 41.0 36 - 46 %    MCV 94.2 80 - 100 fL    MCH 32.1 26 - 34 pg    MCHC 34.1 31 - 37 g/dL    RDW 12.1 12.1 - 15.2 %    Platelets 904 444 - 924 k/uL    MPV NOT REPORTED 6.0 - 12.0 fL    NRBC Automated NOT REPORTED per 100 WBC    Differential Type YES     Seg Neutrophils 70 47 - 75 %    Lymphocytes 22 15 - 40 %    Monocytes 7 4 - 8 %    Eosinophils % 1 0 - 5 %    Basophils 0 0 - 2 %    Immature Granulocytes NOT REPORTED 0 %    Segs Absolute 7.10 (H) 2.5 - 7.0 k/uL    Absolute Lymph # 2.30 1.0 - 4.8 k/uL    Absolute Mono # 0.70 0.0 - 1.0 k/uL    Absolute Eos # 0.10 0.0 - 0.4 k/uL    Basophils Absolute 0.00 0.0 - 0.2 k/uL    Absolute Immature Granulocyte NOT REPORTED 0.00 - 0.30 k/uL    WBC Morphology NOT REPORTED     RBC Morphology NOT REPORTED     Platelet Estimate NOT REPORTED    Comprehensive Metabolic Panel w/ Reflex to MG    Collection Time: 05/27/20  9:30 PM   Result Value Ref Range    Glucose 236 (H) 70 - 99 mg/dL    BUN 33 (H) 8 - 23 mg/dL    CREATININE 0.59 0.50 - 0.90 mg/dL    Bun/Cre Ratio 56 (H) 9 - 20    Calcium 9.9 8.6 - 10.4 mg/dL    Sodium 134 (L) 135 - 144 mmol/L    Potassium 4.4 3.7 - 5.3 mmol/L    Chloride 98 98 - 107 mmol/L    CO2 23 20 - 31 mmol/L    Anion Gap 13 9 - 17 mmol/L    Alkaline Phosphatase 115 (H) 35 - 104 U/L    ALT 16 5 - 33 U/L    AST 22 <32 U/L    Total Bilirubin 0.54 0.30 - 1.20 mg/dL    Total Protein 7.1 6.4 - 8.3 g/dL    Alb 4.4 3.5 - 5.2 g/dL    Albumin/Globulin Ratio NOT REPORTED 1.0 - 2.5    GFR Non-African American >60 >60 mL/min    GFR African American >60 >60 mL/min    GFR Comment          GFR Staging NOT REPORTED    Troponin    Collection Time: 05/27/20  9:30 PM   Result Value Ref Range    Troponin, High Sensitivity NOT REPORTED 0 - 14 ng/L    Troponin T <0.03 <0.03 ng/mL    Troponin Interp         Urinalysis, reflex to microscopic    Collection Time: 05/27/20  9:30 PM   Result Value Ref Range    Color, UA YELLOW YELLOW    Turbidity UA CLEAR CLEAR    Glucose, Ur NEGATIVE NEGATIVE    Bilirubin Urine NEGATIVE NEGATIVE    Ketones, Urine NEGATIVE NEGATIVE    Specific Gravity, UA 1.010 1.005 - 1.030    Urine Hgb TRACE (A) NEGATIVE    pH, UA 7.0 5.0 - 8.0    Protein, UA TRACE (A) NEGATIVE    Urobilinogen, Urine Normal Normal    Nitrite, Urine NEGATIVE NEGATIVE    Leukocyte Esterase, Urine 1+ (A) NEGATIVE    Urinalysis Comments         Microscopic Urinalysis BUN 33*  --    CREATININE 0.59  --    ANIONGAP 13  --    LABGLOM >60  --    GFRAA >60  --    CALCIUM 9.9  --    TROPONINT <0.03  --    LACTACIDWB  --  NOT REPORTED     Recent Labs     05/27/20 2130 05/28/20  1111   PROT 7.1  --    LABALBU 4.4  --    AST 22  --    ALT 16  --    ALKPHOS 115*  --    BILITOT 0.54  --    POCGLU  --  124*       Imaging/Diagnostics:       Xr Chest Portable    Result Date: 5/28/2020  EXAM:  XR CHEST PORTABLE CLINICAL HISTORY: 70years old Female presenting with agitation. TECHNIQUE:  1 view chest x-ray. COMPARISON: Chest x-ray of 12/21/2019. FINDINGS: No pneumothorax, pleural effusion or focal airspace consolidation. Heart is normal in size. Bony thorax is unremarkable. No acute cardiopulmonary process. Impressions :      1. Active Problems:    Delirium  Resolved Problems:    * No resolved hospital problems. *        2.  has a past medical history of Anemia, Chicken pox, Chronic back pain, Dementia ([facility] [address] Diabetes mellitus ([facility] Gastroesophageal reflux disease without esophagitis (12/7/2017), Headache, Hypertension, Measles, Mumps, Osteoarthritis, and Whooping cough.      Plans:     Acute delirium likely secondary to urinary tract infection  IV Rocephin  Bladder scan rule out retention  Dementia with agitation  Morbid obesity  We will do IV Ativan patient is very agitated uncooperative and delirium    Current Facility-Administered Medications   Medication Dose Route Frequency Provider Last Rate Last Dose    atenolol (TENORMIN) tablet 50 mg  50 mg Oral BID LIV Story        insulin lispro (HUMALOG) injection vial 0-12 Units  0-12 Units Subcutaneous TID  LIV Story        insulin lispro (HUMALOG) injection vial 0-6 Units  0-6 Units Subcutaneous Nightly LIV Story        glucose (GLUTOSE) 40 % oral gel 15 g  15 g Oral PRN LIV Story        dextrose 50 % IV solution  12.5 g Intravenous PRN LIV Story        glucagon (rDNA)

## 2020-05-28 NOTE — PROGRESS NOTES
Patient currently combative and unable to perform history. According to ER documentation and note patient is a direct admit from [facility]. 19-year-old female with increased confusion and agitation starting at 2:30 PM today daughter states that this may be due to UTI. EKG and chest x-ray were unremarkable at [facility] ER. Delirium:  Patient is combative upon arrival PRN Ativan, Haldol, and nonviolent restraints ordered.   Ordered urinalysis reflex culture  Continued Rocephin per [facility] ER  Lactic acid  CBC with differential  CMP    Diabetes:  Medium dose sliding scale

## 2020-05-28 NOTE — CARE COORDINATION
Case Management Initial Discharge Plan  Tyler Ford,             Met with:family member patient and daughter to discuss discharge plans. Information verified: address, contacts, phone number, , insurance Yes  Emergency Contact/Next of Kin name & number:   PCP: Chemo Juarez MD in [facility] Date of last visit: recently per daughter    Insurance Provider: Medicare     Discharge Planning    Living Arrangements:      Support Systems:       Home has 2 stories  3 stairs to climb to get into front door, 10 stairs to climb to reach second floor  Location of bedroom/bathroom in home main floor    Patient able to perform ADL's:Assisted    Current Services (outpatient & in home)   DME equipment: has transfer chair, shower chair, commode, and rolater  DME provider: [employer] mostly per daughter  Saleem Medina are through  [facility] in [facility]. Potential Assistance Needed:       Patient agreeable to home care: No  [place] of choice provided:  yes    Prior SNF/Rehab Placement and Facility: n/a  Agreeable to SNF/Rehab: Yes  [place] of choice provided: yes   Evaluation: yes    Expected Discharge date:     Patient expects to be discharged to: Follow Up Appointment: Best Day/ Time:      Transportation provider: elvira mackay most likely. Planning to look at snf next door to [facility] if they have beds. If not there is another snf in that city per family. Transportation arrangements needed for discharge: Yes    Readmission Risk              Risk of Unplanned Readmission:        0             Does patient have a readmission risk score greater than 14?: Yes or   If yes, follow-up appointment must be made within 7 days of discharge. Goals of Care: The Plan for Transition of Care is related to the following treatment goals:      The Patient and/or patient representative daughter was provided with a choice of provider and agrees   with the discharge

## 2020-05-28 NOTE — ED NOTES
Patient transferred to EMS cot. Patient continues to yell loudly and swing at EMS staff. EMS staff request another dose of haldol to safely transport patient. Order's placed.       José Luis Russell RN  05/28/20 7479

## 2020-05-28 NOTE — DISCHARGE INSTR - COC
Continuity of Care Form    Patient Name: Romulo Rees   :  1940  MRN:  [id number]    Admit date:  2020  Discharge date:  2020    Code Status Order: Full Code   Advance Directives:     Admitting Physician:  Dominick Rolle MD  PCP: Nancy Basilio MD    Discharging Nurse: SHAHIDA Bennett [facility] Unit/Room#: 2099/2099-01  Discharging Unit Phone Number: [phone]    Emergency Contact:   Extended Emergency Contact Information  Primary Emergency Contact: Sean Fernandes  Address: [address] Phone: [phone]  Mobile Phone: [phone]  Relation: Child    Past Surgical History:  Past Surgical History:   Procedure Laterality Date    CARPAL TUNNEL RELEASE Left 2005    JOINT REPLACEMENT Right     right knee    JOINT REPLACEMENT Left     left knee    JOINT REPLACEMENT Right 2016    right knee    JOINT REPLACEMENT Right     right hip    JOINT REPLACEMENT Left     left hip    TOE SURGERY Left     joint removed from 2nd toe       Immunization History:   Immunization History   Administered Date(s) Administered    Influenza Vaccine, unspecified formulation 10/10/2002, 2009, 2010, 10/06/2011, 2013, 2014, 2016    Influenza Virus Vaccine 10/15/2017    Influenza, Quadv, IM, PF (6 mo and older Fluzone, Flulaval, Fluarix, and 3 yrs and older Afluria) 10/26/2018, 2019    Pneumococcal Conjugate 13-valent ([id number]) 2015    Pneumococcal Polysaccharide ([id number]) 2013, 2016       Active Problems:  Patient Active Problem List   Diagnosis Code    Essential hypertension I10    Gastroesophageal reflux disease without esophagitis K21.9    Type 2 diabetes mellitus without complication ([facility]) Y37.4    Chronic pain of both shoulders M25.511, G89.29, M25.512    Anxiety F41.9    Atrial fibrillation with RVR ([facility]) I48.91    Nonexudative age-related macular degeneration, bilateral, early dry stage H35.3131    Delirium R41.0       Isolation/Infection:   Isolation          No Isolation        Patient Infection Status     Infection Onset Added Last Indicated Last Indicated By Review Planned Expiration Resolved Resolved By    None active    Resolved    COVID-19 Rule Out 05/27/20 05/27/20 05/27/20 COVID-19, PCR (Ordered)   05/28/20 Rule-Out Test Resulted          Nurse Assessment:  Last Vital Signs: BP (!) 151/70   Pulse 95   Temp 99.2 °F (37.3 °C) (Axillary)   Resp 23   Wt 242 lb 8.1 oz (110 kg)   SpO2 97%   BMI 36.87 kg/m²     Last documented pain score (0-10 scale):    Last Weight:   Wt Readings from Last 1 Encounters:   05/28/20 242 lb 8.1 oz (110 kg)     Mental Status:  disoriented, alert and confused    IV Access:  - None    Nursing Mobility/ADLs:  Walking   Dependent  Transfer  Dependent  Bathing  Dependent  Dressing  Dependent  Toileting  Dependent  Feeding  Dependent  Med Admin  Dependent  Med Delivery   whole    Wound Care Documentation and Therapy:        Elimination:  Continence:   · Bowel: No  · Bladder: No  Urinary Catheter: None   Colostomy/Ileostomy/Ileal Conduit: No       Date of Last BM: 6/4/2020    Intake/Output Summary (Last 24 hours) at 5/28/2020 1359  Last data filed at 5/28/2020 0850  Gross per 24 hour   Intake --   Output 350 ml   Net -350 ml     No intake/output data recorded. Safety Concerns: At Risk for Falls    Impairments/Disabilities:      None    Nutrition Therapy:  Current Nutrition Therapy:   - Oral Diet:  Carb Control 4 carbs/meal (1800kcals/day)    Routes of Feeding: Oral  Liquids: Thin Liquids  Daily Fluid Restriction: no  Last Modified Barium Swallow with Video (Video Swallowing Test): not done    Treatments at the Time of Hospital Discharge:   Respiratory Treatments: n/a  Oxygen Therapy:  is not on home oxygen therapy.   Ventilator:    - No ventilator support    Rehab Therapies: Physical Therapy and Occupational Therapy  Weight Bearing Status/Restrictions: No weight bearing restirctions  Other Medical Equipment (for information only, NOT a DME order):  hospital bed  Other Treatments: skilled nursing assessment per protocol medication education     Patient's personal belongings (please select all that are sent with patient):  Glasses    RN SIGNATURE:  Electronically signed by Antelmo Magdaleno RN on 6/5/20 at 9:21 AM EDT    CASE MANAGEMENT/SOCIAL WORK SECTION    Inpatient Status Date: 5/28/20    Readmission Risk Assessment Score:  Readmission Risk              Risk of Unplanned Readmission:        0           Discharging to Facility/ Agency   [facility] [address]  [address] [place] [address], [address], [address] [place] [address]  [phone]    / signature: Electronically signed by FELIPE Liu, ANYAW on 5/28/20 at 4:43 PM EDT  Electronically signed by ZINA Godwin on 6/4/2020 at 4:01 PM    PHYSICIAN SECTION    Prognosis: Fair    Condition at Discharge: Stable    Rehab Potential (if transferring to Rehab): Fair    Recommended Labs or Other Treatments After Discharge: N/A    Physician Certification: I certify the above information and transfer of Chuy Ellis  is necessary for the continuing treatment of the diagnosis listed and that she requires [facility] for greater 30 days.      Update Admission H&P: No change in H&P    PHYSICIAN SIGNATURE:  Electronically signed by Benji Silveira MD on 6/2/20 at 12:41 PM EDT

## 2020-05-29 LAB
ANION GAP SERPL CALCULATED.3IONS-SCNC: 16 MMOL/L (ref 9–17)
BUN BLDV-MCNC: 7 MG/DL (ref 8–23)
BUN/CREAT BLD: ABNORMAL (ref 9–20)
CALCIUM SERPL-MCNC: 9.4 MG/DL (ref 8.6–10.4)
CHLORIDE BLD-SCNC: 100 MMOL/L (ref 98–107)
CO2: 21 MMOL/L (ref 20–31)
CREAT SERPL-MCNC: <0.4 MG/DL (ref 0.5–0.9)
CULTURE: NORMAL
GFR AFRICAN AMERICAN: ABNORMAL ML/MIN
GFR NON-AFRICAN AMERICAN: ABNORMAL ML/MIN
GFR SERPL CREATININE-BSD FRML MDRD: ABNORMAL ML/MIN/{1.73_M2}
GFR SERPL CREATININE-BSD FRML MDRD: ABNORMAL ML/MIN/{1.73_M2}
GLUCOSE BLD-MCNC: 147 MG/DL (ref 65–105)
GLUCOSE BLD-MCNC: 163 MG/DL (ref 65–105)
GLUCOSE BLD-MCNC: 167 MG/DL (ref 65–105)
GLUCOSE BLD-MCNC: 168 MG/DL (ref 70–99)
GLUCOSE BLD-MCNC: 189 MG/DL (ref 65–105)
LACTIC ACID: 1 MMOL/L (ref 0.5–2.2)
Lab: NORMAL
MAGNESIUM: 1.5 MG/DL (ref 1.6–2.6)
POTASSIUM SERPL-SCNC: 3.9 MMOL/L (ref 3.7–5.3)
SODIUM BLD-SCNC: 137 MMOL/L (ref 135–144)
SPECIMEN DESCRIPTION: NORMAL

## 2020-05-29 PROCEDURE — 2580000003 HC RX 258: Performed by: PHYSICIAN ASSISTANT

## 2020-05-29 PROCEDURE — 99233 SBSQ HOSP IP/OBS HIGH 50: CPT | Performed by: INTERNAL MEDICINE

## 2020-05-29 PROCEDURE — 2580000003 HC RX 258: Performed by: INTERNAL MEDICINE

## 2020-05-29 PROCEDURE — 6360000002 HC RX W HCPCS: Performed by: PHYSICIAN ASSISTANT

## 2020-05-29 PROCEDURE — 2060000000 HC ICU INTERMEDIATE R&B

## 2020-05-29 PROCEDURE — 6370000000 HC RX 637 (ALT 250 FOR IP): Performed by: PHYSICIAN ASSISTANT

## 2020-05-29 PROCEDURE — 6360000002 HC RX W HCPCS: Performed by: INTERNAL MEDICINE

## 2020-05-29 PROCEDURE — 83735 ASSAY OF MAGNESIUM: CPT

## 2020-05-29 PROCEDURE — 99233 SBSQ HOSP IP/OBS HIGH 50: CPT | Performed by: PSYCHIATRY & NEUROLOGY

## 2020-05-29 PROCEDURE — 6360000002 HC RX W HCPCS: Performed by: NURSE PRACTITIONER

## 2020-05-29 PROCEDURE — 83605 ASSAY OF LACTIC ACID: CPT

## 2020-05-29 PROCEDURE — 82947 ASSAY GLUCOSE BLOOD QUANT: CPT

## 2020-05-29 PROCEDURE — 36415 COLL VENOUS BLD VENIPUNCTURE: CPT

## 2020-05-29 PROCEDURE — 6370000000 HC RX 637 (ALT 250 FOR IP): Performed by: INTERNAL MEDICINE

## 2020-05-29 PROCEDURE — 2500000003 HC RX 250 WO HCPCS: Performed by: NURSE PRACTITIONER

## 2020-05-29 PROCEDURE — 2500000003 HC RX 250 WO HCPCS

## 2020-05-29 PROCEDURE — 87040 BLOOD CULTURE FOR BACTERIA: CPT

## 2020-05-29 PROCEDURE — 80048 BASIC METABOLIC PNL TOTAL CA: CPT

## 2020-05-29 RX ORDER — MAGNESIUM SULFATE 1 G/100ML
1 INJECTION INTRAVENOUS PRN
Status: DISCONTINUED | OUTPATIENT
Start: 2020-05-29 | End: 2020-06-05 | Stop reason: HOSPADM

## 2020-05-29 RX ORDER — DILTIAZEM HYDROCHLORIDE 5 MG/ML
10 INJECTION INTRAVENOUS ONCE
Status: COMPLETED | OUTPATIENT
Start: 2020-05-29 | End: 2020-05-29

## 2020-05-29 RX ORDER — METOPROLOL TARTRATE 5 MG/5ML
5 INJECTION INTRAVENOUS EVERY 6 HOURS PRN
Status: DISCONTINUED | OUTPATIENT
Start: 2020-05-29 | End: 2020-06-05 | Stop reason: HOSPADM

## 2020-05-29 RX ORDER — METOPROLOL TARTRATE 5 MG/5ML
INJECTION INTRAVENOUS
Status: COMPLETED
Start: 2020-05-29 | End: 2020-05-29

## 2020-05-29 RX ADMIN — METOPROLOL TARTRATE 5 MG: 1 INJECTION, SOLUTION INTRAVENOUS at 15:40

## 2020-05-29 RX ADMIN — MAGNESIUM SULFATE HEPTAHYDRATE 1 G: 1 INJECTION, SOLUTION INTRAVENOUS at 19:30

## 2020-05-29 RX ADMIN — ENOXAPARIN SODIUM 30 MG: 30 INJECTION SUBCUTANEOUS at 20:10

## 2020-05-29 RX ADMIN — LORAZEPAM 1 MG: 2 INJECTION INTRAMUSCULAR; INTRAVENOUS at 22:32

## 2020-05-29 RX ADMIN — DILTIAZEM HYDROCHLORIDE 10 MG: 5 INJECTION INTRAVENOUS at 12:02

## 2020-05-29 RX ADMIN — INSULIN LISPRO 1 UNITS: 100 INJECTION, SOLUTION INTRAVENOUS; SUBCUTANEOUS at 20:17

## 2020-05-29 RX ADMIN — LORAZEPAM: 2 INJECTION INTRAMUSCULAR; INTRAVENOUS at 10:13

## 2020-05-29 RX ADMIN — CEFEPIME 2 G: 2 INJECTION, POWDER, FOR SOLUTION INTRAVENOUS at 15:40

## 2020-05-29 RX ADMIN — METOPROLOL TARTRATE 5 MG: 1 INJECTION, SOLUTION INTRAVENOUS at 21:37

## 2020-05-29 RX ADMIN — VANCOMYCIN HYDROCHLORIDE 2500 MG: 1 INJECTION, POWDER, LYOPHILIZED, FOR SOLUTION INTRAVENOUS at 20:43

## 2020-05-29 RX ADMIN — DILTIAZEM HYDROCHLORIDE 5 MG/HR: 5 INJECTION INTRAVENOUS at 12:49

## 2020-05-29 RX ADMIN — SODIUM CHLORIDE: 9 INJECTION, SOLUTION INTRAVENOUS at 21:36

## 2020-05-29 RX ADMIN — LORAZEPAM 1 MG: 2 INJECTION INTRAMUSCULAR; INTRAVENOUS at 04:14

## 2020-05-29 RX ADMIN — DILTIAZEM HYDROCHLORIDE 15 MG/HR: 5 INJECTION INTRAVENOUS at 21:36

## 2020-05-29 RX ADMIN — DULOXETINE 30 MG: 30 CAPSULE, DELAYED RELEASE ORAL at 20:11

## 2020-05-29 RX ADMIN — ACETAMINOPHEN 650 MG: 650 SUPPOSITORY RECTAL at 12:59

## 2020-05-29 RX ADMIN — ENOXAPARIN SODIUM 30 MG: 30 INJECTION SUBCUTANEOUS at 10:12

## 2020-05-29 RX ADMIN — Medication 10 ML: at 20:10

## 2020-05-29 RX ADMIN — MAGNESIUM SULFATE HEPTAHYDRATE 1 G: 1 INJECTION, SOLUTION INTRAVENOUS at 18:03

## 2020-05-29 ASSESSMENT — PAIN SCALES - GENERAL
PAINLEVEL_OUTOF10: 0

## 2020-05-29 NOTE — CARE COORDINATION
Social work; Called family 2nd choice in [place] [facility] phone: [phone] and their fax is [phone].  answered the phone and he is willing to take admissions over the weekend if they can handle the patient. Faxing referral now. Will still need COVID test and they are still visitor restricted there. Will advise family of same.   Albert hayes

## 2020-05-29 NOTE — CARE COORDINATION
Social work; spoke to first choice of snf by family Dinah Macias at [facility]. Due to telesitter they will not consider this pt at this time. They also do require the COVID test within 48 hours of discharge at most snf's including this one. They suggested 2 places with memory units in their area. [facility]. Will get family permission to fax those two facilities if family still wants placement as it will be a distance for them. Will need jessica and Rx at discharge. Usually telesitter will need to be off 24 hours if not a memory unit. However due to [address] now for snf admissions it is doubtful if any snf can be secured until a weekday. . Plus it appears pt only has Medicare.   Will ask Shelia Murphy to discuss with daughter if pt might be eligible for medicaid from the help program.  Tresa rosales

## 2020-05-29 NOTE — PROGRESS NOTES
hospital problems. *        2.  has a past medical history of Anemia, Chicken pox, Chronic back pain, Dementia ([facility] [address] Diabetes mellitus ([facility] [address] Gastroesophageal reflux disease without esophagitis (12/7/2017), Headache, Hypertension, Measles, Mumps, Osteoarthritis, and Whooping cough.      Plans:     Acute delirium likely secondary to urinary tract infection  IV Rocephin  Bladder scan rule out retention  Dementia with agitation  Morbid obesity  We will do IV Ativan patient is very agitated uncooperative and delirium  May 29  Sepsis likely secondary to urinary tract infection will change antibiotics to Zosyn and vancomycin discontinue Rocephin A. fib with RVR supportive care check lactic acid blood cultures repeat transfer to stepdown ICU      Current Facility-Administered Medications   Medication Dose Route Frequency Provider Last Rate Last Dose    dilTIAZem 125 mg in dextrose 5 % 125 mL infusion  5 mg/hr Intravenous Continuous Kevin Matos, APRN - CNP 5 mL/hr at 05/29/20 1249 5 mg/hr at 05/29/20 1249    cefepime (MAXIPIME) 2 g IVPB minibag  2 g Intravenous Q12H Eulalio Barragan MD        atenolol (TENORMIN) tablet 50 mg  50 mg Oral BID LIV Mayes        insulin lispro (HUMALOG) injection vial 0-12 Units  0-12 Units Subcutaneous TID WC LIV Mayes        insulin lispro (HUMALOG) injection vial 0-6 Units  0-6 Units Subcutaneous Nightly LIV Mayes        glucose (GLUTOSE) 40 % oral gel 15 g  15 g Oral PRN LIV Mayes        dextrose 50 % IV solution  12.5 g Intravenous PRN LIV Mayes        glucagon (rDNA) injection 1 mg  1 mg Intramuscular PRN LIV Mayes        dextrose 5 % solution  100 mL/hr Intravenous PRN LIV Mayes        sodium chloride flush 0.9 % injection 10 mL  10 mL Intravenous 2 times per day Vasu Mayes        sodium chloride flush 0.9 % injection 10 mL  10 mL Intravenous PRN LIV Mayes        acetaminophen

## 2020-05-29 NOTE — FLOWSHEET NOTE
Robin Denzel did not respond she seemed to having Delirium, I just offered words if peace in prayer.      05/29/20 1103   Encounter Summary   Services provided to: Patient   Referral/Consult From: Rounding   Support System Unknown   Continue Visiting   (5/29/2020)   Complexity of Encounter Moderate   Length of Encounter 15 minutes   Routine   Type Initial   Assessment Anxious;Grieving   Intervention Prayer   Outcome Venting emotion

## 2020-05-29 NOTE — PROGRESS NOTES
Pt transferred to ICU bed 2015. Central telemetry monitoring and continuous pulse ox initiated. Belongings placed in pt cabinet. Bed in lowest position. Bed wheels locked. Side rails up x2. Bed alarm on.

## 2020-05-29 NOTE — CONSULTS
· Eyes: No visual changes or diplopia. No scleral icterus. · ENT: No Headaches, hearing loss or vertigo. No mouth sores or sore throat. · Cardiovascular: see above  · Respiratory: see above  · Gastrointestinal: No abdominal pain, appetite loss, blood in stools. · Genitourinary: No dysuria, trouble voiding, or hematuria. · Musculoskeletal:  No gait disturbance, No weakness or joint complaints. · Integumentary: No rash or pruritis. · Neurological: No headache or diplopia. No tingling  · Psychiatric: No anxiety, or depression. · Endocrine: No temperature intolerance. · Hematologic/Lymphatic: No abnormal bruising or bleeding, blood clots or swollen lymph nodes. · Allergic/Immunologic: No nasal congestion or hives. PHYSICAL EXAM:      BP (!) 114/47   Pulse 122   Temp 101 °F (38.3 °C) (Axillary)   Resp 20   Ht 5' 10\" (1.778 m)   Wt 246 lb 4.1 oz (111.7 kg)   SpO2 95%   BMI 35.33 kg/m²    Constitutional and General Appearance: alert, uncooperative to exam, yelling at me and refusing some parts of assessment. Disoriented  HEENT: PERRL, no cervical lymphadenopathy. No masses palpable. Normal oral mucosa  Respiratory:  · Normal excursion and expansion without use of accessory muscles  · Resp Auscultation: Good respiratory effort. No for increased work of breathing. On auscultation: clear to auscultation bilaterally anteriorly - would not allow me to assess posteriorly at this time. Cardiovascular:  · Heart tones are crisp Irregular - Afib RVR  · Jugular venous pulsation Normal  ·   Abdomen:   · soft  · Bowel sounds present  Extremities:  ·  No edema  Neurological:  · Alert and oriented. DATA:    Diagnostics:    EKG: normal EKG, normal sinus rhythm, Afib RVR,  ECHO: obtained and reviewed. Ejection fraction: 60%  Stress Test: not obtained. Cardiac Angiography: not obtained. Echo 2/2018  Summary  Left ventricle is normal in size. Moderate left ventricular hypertrophy.   Global left

## 2020-05-29 NOTE — FLOWSHEET NOTE
05/29/20 1110   Provider Notification   Reason for Communication Evaluate   Provider Name Dr Gris Brown   Provider Notification Physician   Method of Communication Call   Response See orders   Patient heart rhythm afib RVR. Orders received.

## 2020-05-29 NOTE — H&P
05/28/20  1111 05/29/20  0752 05/29/20  1122   POCGLU 124* 147* 163*       Mental status: Disoriented to time place and person agitated head:  normocephalic, atraumatic. Eye: no icterus, redness, pupils equal and reactive, extraocular eye movements intact, conjunctiva clear  Ear: normal external ear, no discharge, hearing intact  Nose:  no drainage noted  Mouth: mucous membranes moist  Neck: supple, no carotid bruits, thyroid not palpable  Lungs: Bilateral equal air entry, clear to ausculation, no wheezing, rales or rhonchi, normal effort  Cardiovascular: normal rate, regular rhythm, no murmur, gallop, rub.   Abdomen: Soft, nontender, nondistended, normal bowel sounds, no hepatomegaly or splenomegaly  Neurologic: There are no new focal motor or sensory deficits, normal muscle tone and bulk, no abnormal sensation, normal speech,  Skin: No gross lesions, rashes, bruising or bleeding on exposed skin area  Extremities:  peripheral pulses palpable, no pedal edema or calf pain with palpation  Psych agitated not alert oriented and delirium    Investigations:      Laboratory Testing:  Recent Results (from the past 24 hour(s))   POC Glucose Fingerstick    Collection Time: 05/29/20  7:52 AM   Result Value Ref Range    POC Glucose 147 (H) 65 - 105 mg/dL   POC Glucose Fingerstick    Collection Time: 05/29/20 11:22 AM   Result Value Ref Range    POC Glucose 163 (H) 65 - 105 mg/dL       Recent Labs     05/27/20 2130   HGB 14.0   HCT 41.0   WBC 10.2   MCV 94.2   *   K 4.4   CL 98   CO2 23   BUN 33*   CREATININE 0.59   GLUCOSE 236*   AST 22   ALT 16   LABALBU 4.4       Hematology:  Recent Labs     05/27/20 2130   WBC 10.2   RBC 4.36   HGB 14.0   HCT 41.0   MCV 94.2   MCH 32.1   MCHC 34.1   RDW 12.1      MPV NOT REPORTED     Chemistry:  Recent Labs     05/27/20 2130 05/27/20 2139   *  --    K 4.4  --    CL 98  --    CO2 23  --    GLUCOSE 236*  --    BUN 33*  --    CREATININE 0.59  --    ANIONGAP 13  --

## 2020-05-29 NOTE — PLAN OF CARE
Problem: Restraint Use - Nonviolent/Non-Self-Destructive Behavior:  Goal: Absence of restraint indications  Description: Absence of restraint indications  5/29/2020 0329 by Raenette Cushing, RN  Outcome: Ongoing     Problem: Restraint Use - Nonviolent/Non-Self-Destructive Behavior:  Goal: Absence of restraint-related injury  Description: Absence of restraint-related injury  5/29/2020 0329 by Raenette Cushing, RN  Outcome: Ongoing  Note: Q1hr checks beginning at the time of first restraint initiation. Restraints and tele sitter still needed at this time. Will continue to monitor for discontinuation of restraint usage. Skin underneath remains intact per hourly checks. Problem: Confusion - Acute:  Goal: Absence of continued neurological deterioration signs and symptoms  Description: Absence of continued neurological deterioration signs and symptoms  Outcome: Ongoing     Problem: Confusion - Acute:  Goal: Mental status will be restored to baseline  Description: Mental status will be restored to baseline  Outcome: Ongoing     Problem: Discharge Planning:  Goal: Ability to perform activities of daily living will improve  Description: Ability to perform activities of daily living will improve  Outcome: Ongoing     Problem: Discharge Planning:  Goal: Participates in care planning  Description: Participates in care planning  Outcome: Ongoing     Problem: Injury - Risk of, Physical Injury:  Goal: Will remain free from falls  Description: Will remain free from falls  Outcome: Ongoing  Note: Pt remains free from falls at this time. Increased frequency rounding. Bed in lowest position. Tele sitter monitoring.       Problem: Mood - Altered:  Goal: Verbalizations of feeling emotionally comfortable while being cared for will increase  Description: Verbalizations of feeling emotionally comfortable while being cared for will increase  Outcome: Ongoing     Problem: Sensory Perception - Impaired:  Goal: Demonstrations of improved sensory functioning will increase  Description: Demonstrations of improved sensory functioning will increase  Outcome: Ongoing     Problem: Sensory Perception - Impaired:  Goal: Decrease in sensory misperception frequency  Description: Decrease in sensory misperception frequency  Outcome: Ongoing     Problem: Sensory Perception - Impaired:  Goal: Able to distinguish between reality-based and nonreality-based thinking  Description: Able to distinguish between reality-based and nonreality-based thinking  Outcome: Ongoing     Problem: Sensory Perception - Impaired:  Goal: Able to interrupt nonreality-based thinking  Description: Able to interrupt nonreality-based thinking  Outcome: Ongoing     Problem: Sleep Pattern Disturbance:  Goal: Appears well-rested  Description: Appears well-rested  Outcome: Ongoing     Problem: Falls - Risk of:  Goal: Absence of physical injury  Description: Absence of physical injury  Outcome: Ongoing     Problem: Falls - Risk of:  Goal: Will remain free from falls  Description: Will remain free from falls  Outcome: Ongoing  Note: Pt remains free from falls at this time. Increased frequency rounding. Bed in lowest position. Tele sitter monitoring. Problem: Skin Integrity:  Goal: Absence of new skin breakdown  Description: Absence of new skin breakdown  Outcome: Ongoing  Note: No new skin breakdown at this time. Q1hr checks for skin breakdown under soft restraints. Will continue to monitor.

## 2020-05-29 NOTE — PROGRESS NOTES
Tristin Reynolds NP who is covering for Dr. Ida Lozano. Ad Suggs NP called back and spoke with RN regarding consult for patient's A-fib RVR.

## 2020-05-29 NOTE — PROGRESS NOTES
[facility]                     NEUROLOGY PROGRESS NOTE    PATIENT NAME: Aby Garner   PATIENT MRN: [id number]   PRIMARY CARE PHYSICIAN: Rondel Aase, MD  DATE OF SERVICE: 5/29/2020    CHIEF COMPLAINT: \"Fine\"     SUBJECTIVE:    Pt was awake, alert, answers some questions, oriented to self and hospital, mental status much improved since yesterday. Per nursing staff, pt was very tachycardiac with heart rate to 160s' ( cardiology was consulted), also had temp, so she was transferred to ICU, Bx was sent for suspected sepsis.        PAST MEDICAL HISTORY:         Diagnosis Date    Anemia     Chicken pox     Chronic back pain     Dementia ([facility] [address]     Diabetes mellitus (H[facility])     Gastroesophageal reflux disease without esophagitis 12/7/2017    Headache     Hypertension     Measles     Mumps     Osteoarthritis     Whooping cough        MEDICATIONS:     Current Facility-Administered Medications   Medication Dose Route Frequency Provider Last Rate Last Dose    dilTIAZem 125 mg in dextrose 5 % 125 mL infusion  5 mg/hr Intravenous Continuous Sarah Causey APRN - CNP 15 mL/hr at 05/29/20 1517 15 mg/hr at 05/29/20 1517    cefepime (MAXIPIME) 2 g IVPB minibag  2 g Intravenous Q12H Rachel Ghosh MD   Stopped at 05/29/20 1610    vancomycin (VANCOCIN) intermittent dosing (placeholder)   Other RX Placeholder Chema Gulshan Lim MD        vancomycin (VANCOCIN) 2,500 mg in dextrose 5 % 500 mL IVPB  2,500 mg Intravenous Once Rachel Ghosh MD        [START ON 5/30/2020] vancomycin (VANCOCIN) 1,500 mg in dextrose 5 % 250 mL IVPB (ADDAVIAL)  1,500 mg Intravenous Q12H Rachel Ghosh MD        metoprolol (LOPRESSOR) injection 5 mg  5 mg Intravenous Q6H PRN RISSA Vasquez - CNP   5 mg at 05/29/20 1540    magnesium sulfate 1 g in dextrose 5% 100 mL IVPB  1 g Intravenous PRN Sarah Causey APRN - CNP       Liset Dickey

## 2020-05-29 NOTE — PROGRESS NOTES
Bilateral wrist restraints applied. No signs of injury noted, RN tried to explain the reason for the restraints, No evidence of learning. Bruising scattered present on bilateral arms present on admission,.

## 2020-05-29 NOTE — PLAN OF CARE
Pt assessed as a fall risk this shift. Remains free from falls and accidental injury at this time. Fall precautions in place, including falling star sign and fall risk band on pt. Floor free from obstacles, and bed is locked and in lowest position. Adequate lighting provided. Pt encouraged to call before getting OOB for any need. Bed alarm activated. Will continue to monitor needs during hourly rounding, and reinforce education on use of call light. Skin assessment complete. Waffle mattress in place. Coccyx reddened. S[employer]are applied PRN. Turned and repositioned every two hours. Area kept free from moisture. Proper nourishment and fluids encouraged, as appropriate. Will continue to monitor for additional needs and changes in skin breakdown.

## 2020-05-29 NOTE — CARE COORDINATION
Social work: have been keeping in touch with daughter's 2nd choice snf [facility] in [address]. Phone and address are on the jessica. Informed them that pt was not likely to discharge over the weekend per RN. Elena haywood. Daughter calling floor for an update also. She was informed about her mom's finances and what are the limits for medicaid.   Emiliano hayes

## 2020-05-30 ENCOUNTER — APPOINTMENT (OUTPATIENT)
Dept: GENERAL RADIOLOGY | Age: 80
DRG: 871 | End: 2020-05-30
Attending: INTERNAL MEDICINE
Payer: MEDICARE

## 2020-05-30 LAB
C-REACTIVE PROTEIN: 78.8 MG/L (ref 0–5)
CREAT SERPL-MCNC: <0.4 MG/DL (ref 0.5–0.9)
GFR AFRICAN AMERICAN: ABNORMAL ML/MIN
GFR NON-AFRICAN AMERICAN: ABNORMAL ML/MIN
GFR SERPL CREATININE-BSD FRML MDRD: ABNORMAL ML/MIN/{1.73_M2}
GFR SERPL CREATININE-BSD FRML MDRD: ABNORMAL ML/MIN/{1.73_M2}
GLUCOSE BLD-MCNC: 120 MG/DL (ref 65–105)
GLUCOSE BLD-MCNC: 140 MG/DL (ref 65–105)
GLUCOSE BLD-MCNC: 152 MG/DL (ref 65–105)
GLUCOSE BLD-MCNC: 152 MG/DL (ref 65–105)
LACTIC ACID: 0.7 MMOL/L (ref 0.5–2.2)
PROCALCITONIN: 0.11 NG/ML

## 2020-05-30 PROCEDURE — 99233 SBSQ HOSP IP/OBS HIGH 50: CPT | Performed by: PSYCHIATRY & NEUROLOGY

## 2020-05-30 PROCEDURE — 86140 C-REACTIVE PROTEIN: CPT

## 2020-05-30 PROCEDURE — 84145 PROCALCITONIN (PCT): CPT

## 2020-05-30 PROCEDURE — 99233 SBSQ HOSP IP/OBS HIGH 50: CPT | Performed by: INTERNAL MEDICINE

## 2020-05-30 PROCEDURE — 6360000002 HC RX W HCPCS: Performed by: PHYSICIAN ASSISTANT

## 2020-05-30 PROCEDURE — 6360000002 HC RX W HCPCS: Performed by: STUDENT IN AN ORGANIZED HEALTH CARE EDUCATION/TRAINING PROGRAM

## 2020-05-30 PROCEDURE — 36415 COLL VENOUS BLD VENIPUNCTURE: CPT

## 2020-05-30 PROCEDURE — 2500000003 HC RX 250 WO HCPCS: Performed by: NURSE PRACTITIONER

## 2020-05-30 PROCEDURE — 6370000000 HC RX 637 (ALT 250 FOR IP): Performed by: PHYSICIAN ASSISTANT

## 2020-05-30 PROCEDURE — 6370000000 HC RX 637 (ALT 250 FOR IP): Performed by: INTERNAL MEDICINE

## 2020-05-30 PROCEDURE — 2580000003 HC RX 258: Performed by: INTERNAL MEDICINE

## 2020-05-30 PROCEDURE — 2060000000 HC ICU INTERMEDIATE R&B

## 2020-05-30 PROCEDURE — 82565 ASSAY OF CREATININE: CPT

## 2020-05-30 PROCEDURE — 2580000003 HC RX 258: Performed by: PHYSICIAN ASSISTANT

## 2020-05-30 PROCEDURE — 83605 ASSAY OF LACTIC ACID: CPT

## 2020-05-30 PROCEDURE — 82947 ASSAY GLUCOSE BLOOD QUANT: CPT

## 2020-05-30 PROCEDURE — 6360000002 HC RX W HCPCS: Performed by: INTERNAL MEDICINE

## 2020-05-30 PROCEDURE — 71045 X-RAY EXAM CHEST 1 VIEW: CPT

## 2020-05-30 PROCEDURE — 99222 1ST HOSP IP/OBS MODERATE 55: CPT | Performed by: INTERNAL MEDICINE

## 2020-05-30 RX ORDER — LORAZEPAM 2 MG/ML
2 INJECTION INTRAMUSCULAR ONCE
Status: COMPLETED | OUTPATIENT
Start: 2020-05-30 | End: 2020-06-01

## 2020-05-30 RX ORDER — LEVOFLOXACIN 5 MG/ML
500 INJECTION, SOLUTION INTRAVENOUS EVERY 24 HOURS
Status: DISCONTINUED | OUTPATIENT
Start: 2020-05-30 | End: 2020-05-30

## 2020-05-30 RX ORDER — LEVOFLOXACIN 500 MG/1
500 TABLET, FILM COATED ORAL DAILY
Status: DISCONTINUED | OUTPATIENT
Start: 2020-05-31 | End: 2020-05-31

## 2020-05-30 RX ADMIN — LORAZEPAM 1 MG: 2 INJECTION INTRAMUSCULAR; INTRAVENOUS at 04:43

## 2020-05-30 RX ADMIN — SODIUM CHLORIDE: 9 INJECTION, SOLUTION INTRAVENOUS at 15:02

## 2020-05-30 RX ADMIN — ENOXAPARIN SODIUM 30 MG: 30 INJECTION SUBCUTANEOUS at 08:20

## 2020-05-30 RX ADMIN — LEVOFLOXACIN 500 MG: 5 INJECTION, SOLUTION INTRAVENOUS at 13:40

## 2020-05-30 RX ADMIN — MULTIPLE VITAMINS W/ MINERALS TAB 1 TABLET: TAB at 08:20

## 2020-05-30 RX ADMIN — LORAZEPAM 1 MG: 2 INJECTION INTRAMUSCULAR; INTRAVENOUS at 09:02

## 2020-05-30 RX ADMIN — SODIUM CHLORIDE: 9 INJECTION, SOLUTION INTRAVENOUS at 22:25

## 2020-05-30 RX ADMIN — INSULIN LISPRO 1 UNITS: 100 INJECTION, SOLUTION INTRAVENOUS; SUBCUTANEOUS at 21:10

## 2020-05-30 RX ADMIN — SODIUM CHLORIDE: 9 INJECTION, SOLUTION INTRAVENOUS at 05:42

## 2020-05-30 RX ADMIN — Medication 10 ML: at 08:38

## 2020-05-30 RX ADMIN — DILTIAZEM HYDROCHLORIDE 12.5 MG/ML: 5 INJECTION INTRAVENOUS at 05:42

## 2020-05-30 RX ADMIN — DULOXETINE 30 MG: 30 CAPSULE, DELAYED RELEASE ORAL at 21:10

## 2020-05-30 RX ADMIN — VANCOMYCIN HYDROCHLORIDE 1500 MG: 1.5 INJECTION, POWDER, LYOPHILIZED, FOR SOLUTION INTRAVENOUS at 08:50

## 2020-05-30 RX ADMIN — HALOPERIDOL LACTATE 2 MG: 5 INJECTION, SOLUTION INTRAMUSCULAR at 04:43

## 2020-05-30 RX ADMIN — METOPROLOL TARTRATE 5 MG: 1 INJECTION, SOLUTION INTRAVENOUS at 05:42

## 2020-05-30 RX ADMIN — INSULIN LISPRO 2 UNITS: 100 INJECTION, SOLUTION INTRAVENOUS; SUBCUTANEOUS at 12:09

## 2020-05-30 RX ADMIN — CEFEPIME 2 G: 2 INJECTION, POWDER, FOR SOLUTION INTRAVENOUS at 01:45

## 2020-05-30 RX ADMIN — INSULIN LISPRO 2 UNITS: 100 INJECTION, SOLUTION INTRAVENOUS; SUBCUTANEOUS at 08:21

## 2020-05-30 RX ADMIN — ENOXAPARIN SODIUM 30 MG: 30 INJECTION SUBCUTANEOUS at 21:10

## 2020-05-30 RX ADMIN — DILTIAZEM HYDROCHLORIDE 5 MG/HR: 5 INJECTION INTRAVENOUS at 21:16

## 2020-05-30 ASSESSMENT — ENCOUNTER SYMPTOMS
ABDOMINAL PAIN: 0
SHORTNESS OF BREATH: 0
NAUSEA: 0

## 2020-05-30 NOTE — PLAN OF CARE
Problem: Injury - Risk of, Physical Injury:  Goal: Absence of physical injury  Description: Absence of physical injury  5/30/2020 1509 by Zohaib Alvares RN  Outcome: Met This Shift  Note: 1:1 at bedside this shift for safety   5/30/2020 0504 by Pia Hawkins RN  Outcome: Ongoing  Goal: Will remain free from falls  Description: Will remain free from falls  5/30/2020 1509 by Zohaib Alvares RN  Outcome: Met This Shift  5/30/2020 0504 by Pia Hakwins RN  Outcome: Ongoing     Problem: Mood - Altered:  Goal: Absence of abusive behavior  Description: Absence of abusive behavior  5/30/2020 1509 by Zohaib Alvares RN  Outcome: Met This Shift  5/30/2020 0504 by Pia Hawkins RN  Outcome: Ongoing  Goal: Verbalizations of feeling emotionally comfortable while being cared for will increase  Description: Verbalizations of feeling emotionally comfortable while being cared for will increase  5/30/2020 1509 by Zohaib Alvares RN  Outcome: Met This Shift  5/30/2020 0504 by Pia Hawkins RN  Outcome: Ongoing     Problem: Falls - Risk of:  Goal: Absence of physical injury  Description: Absence of physical injury  5/30/2020 1509 by Zohaib Alvares RN  Outcome: Met This Shift  Note: 1:1 at bedside this shift for safety   5/30/2020 0504 by Pia Hawkins RN  Outcome: Ongoing  Goal: Will remain free from falls  Description: Will remain free from falls  5/30/2020 1509 by Zohaib Alvares RN  Outcome: Met This Shift  5/30/2020 0504 by Pia Hawkins RN  Outcome: Ongoing

## 2020-05-30 NOTE — CONSULTS
[facility] -   Infectious diseases evaluation  admission date 5/28/2020      Impression :   Current:  · Encephalopathy  ·  UTI  · Fever  · Atrial fibrillation with rapid ventricular response  · Diabetes mellitus  · Hypertension  · GERD    · Penicillin and sulfa allergy    Recommendations   ·  Oral Levaquin  · Chest x-ray  · CBC, procalcitonin level, lactic acid  · She remains on IV Cardizem  · Follow blood cultures  · Follow CBC renal function       History of Present Illness:   Initial history:  Emilia Ac is a 70y.o.-year-old female was transferred from [facility] for reported agitation , acute delirium. She did have urinary retention, Gunderson catheter was placed with reported 750 cc of urine output  COVID-19 test was negative  Initial urinalysis on 5/2720 showed 5-10 WBC, +1 leukocyte esterase  Initial labs showed lactic acid of 2.8 then normalized, WBC normal, renal function normal  She was treated with IV antibiotics initially  Blood and urine culture on admission no growth to date and her antibiotics was changed to oral Levaquin. History of atrial fibrillation status post cardioversion 2018  Interval changes  5/30/2020   The patient is pleasantly confused, and unable to provide history, most of the history obtained from chart review and nursing staff  Temperature max was 101 on 5/29  Afebrile today        I have personally reviewed the past medical history, past surgical history, medications, social history, and family history, and I haveupdated the database accordingly.   Past Medical History:     Past Medical History:   Diagnosis Date    Anemia     Chicken pox     Chronic back pain     Dementia ([address]     Diabetes mellitus ([address]     Gastroesophageal reflux disease without esophagitis 12/7/2017    Headache     Hypertension     Measles     Mumps     Osteoarthritis     Whooping cough        Past Surgical  History:     Past Surgical History: Procedure Laterality Date    CARPAL TUNNEL RELEASE Left 2005    JOINT REPLACEMENT Right 2004    right knee    JOINT REPLACEMENT Left 2005    left knee    JOINT REPLACEMENT Right 2016    right knee    JOINT REPLACEMENT Right 2001    right hip    JOINT REPLACEMENT Left 2006    left hip    TOE SURGERY Left 2006    joint removed from 2nd toe       Medications:      [START ON 5/31/2020] levoFLOXacin  500 mg Oral Daily    insulin lispro  0-12 Units Subcutaneous TID WC    insulin lispro  0-6 Units Subcutaneous Nightly    sodium chloride flush  10 mL Intravenous 2 times per day    enoxaparin  30 mg Subcutaneous BID    DULoxetine  30 mg Oral Nightly    therapeutic multivitamin-minerals  1 tablet Oral Daily       Social History:     Social History     Socioeconomic History    Marital status:      Spouse name: Not on file    Number of children: Not on file    Years of education: Not on file    Highest education level: Not on file   Occupational History    Not on file   Social Needs    Financial resource strain: Not on file    Food insecurity     Worry: Not on file     Inability: Not on file    Transportation needs     Medical: Not on file     Non-medical: Not on file   Tobacco Use    Smoking status: Never Smoker    Smokeless tobacco: Never Used   Substance and Sexual Activity    Alcohol use: No    Drug use: No    Sexual activity: Not on file   Lifestyle    Physical activity     Days per week: Not on file     Minutes per session: Not on file    Stress: Not on file   Relationships    Social connections     Talks on phone: Not on file     Gets together: Not on file     Attends Shinto service: Not on file     Active member of club or organization: Not on file     Attends meetings of clubs or organizations: Not on file     Relationship status: Not on file    Intimate partner violence     Fear of current or ex partner: Not on file     Emotionally abused: Not on file     Physically abused:

## 2020-05-30 NOTE — PLAN OF CARE
of improved sensory functioning will increase  Outcome: Ongoing  Goal: Decrease in sensory misperception frequency  Description: Decrease in sensory misperception frequency  Outcome: Ongoing  Goal: Able to refrain from responding to false sensory perceptions  Description: Able to refrain from responding to false sensory perceptions  Outcome: Ongoing  Goal: Demonstrates accurate environmental perceptions  Description: Demonstrates accurate environmental perceptions  Outcome: Ongoing  Goal: Able to distinguish between reality-based and nonreality-based thinking  Description: Able to distinguish between reality-based and nonreality-based thinking  Outcome: Ongoing  Goal: Able to interrupt nonreality-based thinking  Description: Able to interrupt nonreality-based thinking  Outcome: Ongoing     Problem: Sleep Pattern Disturbance:  Goal: Appears well-rested  Description: Appears well-rested  Outcome: Ongoing     Problem: Falls - Risk of:  Goal: Absence of physical injury  Description: Absence of physical injury  Outcome: Ongoing  Goal: Will remain free from falls  Description: Will remain free from falls  5/30/2020 0504 by Iggy Connor RN  Outcome: Ongoing  5/29/2020 1742 by Gian Valladares RN  Outcome: Ongoing     Problem: Skin Integrity:  Goal: Will show no infection signs and symptoms  Description: Will show no infection signs and symptoms  5/30/2020 0504 by Iggy Connor RN  Outcome: Ongoing  5/29/2020 1742 by Gian Valladares RN  Outcome: Ongoing  Goal: Absence of new skin breakdown  Description: Absence of new skin breakdown  Outcome: Ongoing

## 2020-05-30 NOTE — CARE COORDINATION
ONGOING DISCHARGE PLAN:    LSW following for rehab to [facility] in [place]. They require covid test within 48 hours of discharge    Hx of dementia    Remains on Iv Vanco, IV fluids, Cardizem gtt, IV cefepime. Terrell Header - 16%    Will continue to follow for additional discharge needs.     Electronically signed by Yuly Cronin RN on 5/30/2020 at 10:34 AM

## 2020-05-30 NOTE — PROGRESS NOTES
Pharmacy Note  Vancomycin Consult       Vancomycin Day: 2  Dose = 1500 mg every 12 hours. Plan:  Trough tomorrow at 2000 hrs. Patient's labs, cultures, vitals, and vancomycin regimen reviewed. No changes today. Estella Godfrey. Ph.  5/30/2020  9:09 AM

## 2020-05-30 NOTE — PROGRESS NOTES
[address] [facility].    PROGRESS NOTE             5/30/2020    10:28 AM    Name:   Mervin Bolanos  MRN:     [id number]     [place]:      [de-identified]   Room:   2015/2015-01  IP Day:  2  Admit Date:  5/28/2020  6:27 AM    PCP:  Raejean Bernheim, MD  Code Status:  Full Code    Subjective:     C/C: No chief complaint on file. Interval History Status: improved. Patient was seen and examined at bedside. Per RN, no acute events overnight. Patient did not have any complaints today AM. Patient was awake, alert however only oriented to self. Per RN, patient did not sleep at all overnight. Review of Systems:     Review of Systems   Constitutional: Negative for chills and fever. Respiratory: Negative for shortness of breath. Cardiovascular: Negative for chest pain. Gastrointestinal: Negative for abdominal pain and nausea. Genitourinary: Negative for dysuria. Medications: Allergies:     Allergies   Allergen Reactions    Penicillins Hives    Sulfa Antibiotics Hives    Aspirin Other (See Comments)     Nose bleeds in high doses         Current Meds:   Scheduled Meds:    cefepime  2 g Intravenous Q12H    vancomycin (VANCOCIN) intermittent dosing (placeholder)   Other RX Placeholder    vancomycin  1,500 mg Intravenous Q12H    insulin lispro  0-12 Units Subcutaneous TID WC    insulin lispro  0-6 Units Subcutaneous Nightly    sodium chloride flush  10 mL Intravenous 2 times per day    enoxaparin  30 mg Subcutaneous BID    DULoxetine  30 mg Oral Nightly    therapeutic multivitamin-minerals  1 tablet Oral Daily     Continuous Infusions:    dilTIAZem (CARDIZEM) 125 mg in dextrose 5% 125 mL infusion 7.5 mg/hr (05/30/20 0953)    dextrose      sodium chloride 125 mL/hr at 05/30/20 0542     PRN Meds: metoprolol, magnesium sulfate, glucose, dextrose, glucagon (rDNA), dextrose, sodium chloride flush, acetaminophen **OR** acetaminophen, Component Value Date     05/29/2020    K 3.9 05/29/2020     05/29/2020    CO2 21 05/29/2020    BUN 7 05/29/2020    LABALBU 4.4 05/27/2020    CREATININE <0.40 05/30/2020    CREATININE 0.7 09/08/2017    CALCIUM 9.4 05/29/2020    GFRAA CANNOT BE CALCULATED 05/30/2020    LABGLOM CANNOT BE CALCULATED 05/30/2020    GLUCOSE 168 05/29/2020       Lab Results   Component Value Date/Time    SPECIAL R AC 6 PURPLE 3 RED 05/29/2020 01:00 PM     Lab Results   Component Value Date/Time    CULTURE NO GROWTH 12 HOURS 05/29/2020 01:00 PM         Radiology:    Xr Chest Portable    Result Date: 5/28/2020  EXAM:  XR CHEST PORTABLE CLINICAL HISTORY: 70years old Female presenting with agitation. TECHNIQUE:  1 view chest x-ray. COMPARISON: Chest x-ray of 12/21/2019. FINDINGS: No pneumothorax, pleural effusion or focal airspace consolidation. Heart is normal in size. Bony thorax is unremarkable. No acute cardiopulmonary process. Physical Examination:        Physical Exam  HENT:      Head: Normocephalic and atraumatic. Cardiovascular:      Rate and Rhythm: Normal rate and regular rhythm. Pulses: Normal pulses. Heart sounds: Normal heart sounds. Pulmonary:      Effort: Pulmonary effort is normal. No respiratory distress. Breath sounds: Normal breath sounds. No wheezing. Abdominal:      Palpations: Abdomen is soft. Tenderness: There is no abdominal tenderness. There is no guarding. Skin:     General: Skin is warm and dry. Neurological:      General: No focal deficit present. Mental Status: She is alert.       Comments: Oriented to self only           Assessment:        Primary Problem  Delirium    Active Hospital Problems    Diagnosis Date Noted    Delirium [R41.0] 05/28/2020    Atrial fibrillation with RVR (HCC) [I48.91] 02/06/2018    Essential hypertension [I10] 12/07/2017    Gastroesophageal reflux disease without esophagitis [K21.9] 12/07/2017    Type 2 diabetes mellitus

## 2020-05-30 NOTE — PROGRESS NOTES
[facility]   Progress Note                   Date:   5/30/2020  Patient name: Reese Ayala  Date of admission:  5/28/2020  6:27 AM  MRN:   [id number]  YOB: 1940  PCP: Regina Horne MD    Reason for Admission: Atrial fibrillation    Subjective:       Clinical Changes / Abnormalities: Sleepy. Not able to arouse. Medications:   Scheduled Meds:   cefepime  2 g Intravenous Q12H    vancomycin (VANCOCIN) intermittent dosing (placeholder)   Other RX Placeholder    vancomycin  1,500 mg Intravenous Q12H    insulin lispro  0-12 Units Subcutaneous TID WC    insulin lispro  0-6 Units Subcutaneous Nightly    sodium chloride flush  10 mL Intravenous 2 times per day    enoxaparin  30 mg Subcutaneous BID    DULoxetine  30 mg Oral Nightly    therapeutic multivitamin-minerals  1 tablet Oral Daily     Continuous Infusions:   dilTIAZem (CARDIZEM) 125 mg in dextrose 5% 125 mL infusion 10 mg/hr (05/30/20 0630)    dextrose      sodium chloride 125 mL/hr at 05/30/20 0542     CBC:   Recent Labs     05/27/20 2130   WBC 10.2   HGB 14.0        BMP:    Recent Labs     05/27/20  2130 05/29/20  1259 05/30/20  0737   * 137  --    K 4.4 3.9  --    CL 98 100  --    CO2 23 21  --    BUN 33* 7*  --    CREATININE 0.59 <0.40* <0.40*   GLUCOSE 236* 168*  --      Hepatic:   Recent Labs     05/27/20 2130   AST 22   ALT 16   BILITOT 0.54   ALKPHOS 115*     Troponin: No results for input(s): TROPONINI in the last 72 hours. BNP: No results for input(s): BNP in the last 72 hours. Lipids: No results for input(s): CHOL, HDL in the last 72 hours. Invalid input(s): LDLCALCU  INR: No results for input(s): INR in the last 72 hours.     Objective:   Vitals: BP (!) 140/64   Pulse 99   Temp 98.2 °F (36.8 °C) (Oral)   Resp 20   Ht 5' 10\" (1.778 m)   Wt 246 lb 4.1 oz (111.7 kg)   SpO2 94%   BMI 35.33 kg/m²   General appearance: alert and cooperative with exam  HEENT: Head: Normocephalic, no lesions, without obvious abnormality. Neck: no adenopathy, no carotid bruit, no JVD, supple, symmetrical, trachea midline and thyroid not enlarged, symmetric, no tenderness/mass/nodules  Lungs: clear to auscultation bilaterally  Heart: Irregular S! And S2, no murmur  Abdomen: soft, non-tender; bowel sounds normal; no masses,  no organomegaly  Extremities: extremities normal, atraumatic, no cyanosis or edema      Echo 2/2018  Summary  Left ventricle is normal in size. Moderate left ventricular hypertrophy. Global left ventricular systolic function is normal with an estimated  ejection fraction of 60 % . Left atrium is severely dilated. Right atrium is moderately dilated . Mildly dilated right ventricular cavity. Aortic valve leaflets are mildly thickened. No aortic stenosis. Mitral annular calcification is seen. Calcification of the mitral valve: Moderate calcification of the posterior  leaflet . Mild to moderate mitral regurgitation.     In summary, she has normal LV function with EF 60%. MV calcification, but no MS. Moderate MR. Right sided chambers mildly enlarged  LA severely enlarged, which is probably the cause of her atrial  fibrillation.           Assessment / Acute Cardiac Problems/Plan   1. PAF. Currently rate controlled on IV Cardizem. Will start PO medications when possible. No anticoagulation due to fall risk. 2. UTI. On antibiotics  3. Delirium. Followed by neurology.            Tyrell Travis MD  [facility]  [phone]

## 2020-05-31 LAB
GLUCOSE BLD-MCNC: 119 MG/DL (ref 65–105)
GLUCOSE BLD-MCNC: 145 MG/DL (ref 65–105)
GLUCOSE BLD-MCNC: 147 MG/DL (ref 65–105)
GLUCOSE BLD-MCNC: 177 MG/DL (ref 65–105)

## 2020-05-31 PROCEDURE — 99233 SBSQ HOSP IP/OBS HIGH 50: CPT | Performed by: PSYCHIATRY & NEUROLOGY

## 2020-05-31 PROCEDURE — 6360000002 HC RX W HCPCS: Performed by: STUDENT IN AN ORGANIZED HEALTH CARE EDUCATION/TRAINING PROGRAM

## 2020-05-31 PROCEDURE — 6360000002 HC RX W HCPCS: Performed by: PHYSICIAN ASSISTANT

## 2020-05-31 PROCEDURE — 6360000002 HC RX W HCPCS: Performed by: INTERNAL MEDICINE

## 2020-05-31 PROCEDURE — 99232 SBSQ HOSP IP/OBS MODERATE 35: CPT | Performed by: INTERNAL MEDICINE

## 2020-05-31 PROCEDURE — 6370000000 HC RX 637 (ALT 250 FOR IP): Performed by: INTERNAL MEDICINE

## 2020-05-31 PROCEDURE — 82947 ASSAY GLUCOSE BLOOD QUANT: CPT

## 2020-05-31 PROCEDURE — 2060000000 HC ICU INTERMEDIATE R&B

## 2020-05-31 PROCEDURE — 2580000003 HC RX 258: Performed by: NURSE PRACTITIONER

## 2020-05-31 PROCEDURE — 2500000003 HC RX 250 WO HCPCS: Performed by: NURSE PRACTITIONER

## 2020-05-31 PROCEDURE — 2580000003 HC RX 258: Performed by: INTERNAL MEDICINE

## 2020-05-31 PROCEDURE — 6370000000 HC RX 637 (ALT 250 FOR IP): Performed by: PHYSICIAN ASSISTANT

## 2020-05-31 PROCEDURE — 2580000003 HC RX 258: Performed by: PHYSICIAN ASSISTANT

## 2020-05-31 PROCEDURE — 2580000003 HC RX 258: Performed by: STUDENT IN AN ORGANIZED HEALTH CARE EDUCATION/TRAINING PROGRAM

## 2020-05-31 PROCEDURE — 99231 SBSQ HOSP IP/OBS SF/LOW 25: CPT | Performed by: INTERNAL MEDICINE

## 2020-05-31 RX ORDER — DILTIAZEM HYDROCHLORIDE 120 MG/1
120 CAPSULE, COATED, EXTENDED RELEASE ORAL DAILY
Status: DISCONTINUED | OUTPATIENT
Start: 2020-05-31 | End: 2020-06-01

## 2020-05-31 RX ORDER — AMIODARONE HYDROCHLORIDE 200 MG/1
200 TABLET ORAL 2 TIMES DAILY
Status: DISCONTINUED | OUTPATIENT
Start: 2020-05-31 | End: 2020-06-05 | Stop reason: HOSPADM

## 2020-05-31 RX ADMIN — METOPROLOL TARTRATE 5 MG: 1 INJECTION, SOLUTION INTRAVENOUS at 21:15

## 2020-05-31 RX ADMIN — DILTIAZEM HYDROCHLORIDE 120 MG: 120 CAPSULE, COATED, EXTENDED RELEASE ORAL at 09:41

## 2020-05-31 RX ADMIN — MULTIPLE VITAMINS W/ MINERALS TAB 1 TABLET: TAB at 09:41

## 2020-05-31 RX ADMIN — Medication 10 ML: at 20:59

## 2020-05-31 RX ADMIN — CEFTRIAXONE SODIUM 1 G: 1 INJECTION, POWDER, FOR SOLUTION INTRAMUSCULAR; INTRAVENOUS at 15:10

## 2020-05-31 RX ADMIN — DILTIAZEM HYDROCHLORIDE 2.5 MG/HR: 5 INJECTION INTRAVENOUS at 06:12

## 2020-05-31 RX ADMIN — AMIODARONE HYDROCHLORIDE 200 MG: 200 TABLET ORAL at 20:59

## 2020-05-31 RX ADMIN — INSULIN LISPRO 2 UNITS: 100 INJECTION, SOLUTION INTRAVENOUS; SUBCUTANEOUS at 12:13

## 2020-05-31 RX ADMIN — ENOXAPARIN SODIUM 30 MG: 30 INJECTION SUBCUTANEOUS at 20:59

## 2020-05-31 RX ADMIN — ACETAMINOPHEN 650 MG: 325 TABLET, FILM COATED ORAL at 16:09

## 2020-05-31 RX ADMIN — DULOXETINE 30 MG: 30 CAPSULE, DELAYED RELEASE ORAL at 20:59

## 2020-05-31 RX ADMIN — ENOXAPARIN SODIUM 30 MG: 30 INJECTION SUBCUTANEOUS at 09:41

## 2020-05-31 RX ADMIN — INSULIN LISPRO 2 UNITS: 100 INJECTION, SOLUTION INTRAVENOUS; SUBCUTANEOUS at 17:49

## 2020-05-31 RX ADMIN — SODIUM CHLORIDE: 9 INJECTION, SOLUTION INTRAVENOUS at 06:12

## 2020-05-31 RX ADMIN — INSULIN LISPRO 2 UNITS: 100 INJECTION, SOLUTION INTRAVENOUS; SUBCUTANEOUS at 09:41

## 2020-05-31 RX ADMIN — POLYETHYLENE GLYCOL 3350 17 G: 17 POWDER, FOR SOLUTION ORAL at 14:19

## 2020-05-31 RX ADMIN — SODIUM CHLORIDE: 9 INJECTION, SOLUTION INTRAVENOUS at 22:46

## 2020-05-31 RX ADMIN — LORAZEPAM 1 MG: 2 INJECTION INTRAMUSCULAR; INTRAVENOUS at 13:46

## 2020-05-31 RX ADMIN — SODIUM CHLORIDE: 9 INJECTION, SOLUTION INTRAVENOUS at 13:47

## 2020-05-31 RX ADMIN — AMIODARONE HYDROCHLORIDE 200 MG: 200 TABLET ORAL at 09:41

## 2020-05-31 ASSESSMENT — ENCOUNTER SYMPTOMS
NAUSEA: 0
SHORTNESS OF BREATH: 0
VOMITING: 0
ABDOMINAL PAIN: 0

## 2020-05-31 ASSESSMENT — PAIN SCALES - GENERAL
PAINLEVEL_OUTOF10: 6
PAINLEVEL_OUTOF10: 0

## 2020-05-31 NOTE — PROGRESS NOTES
CALCULATED 05/30/2020    GLUCOSE 168 05/29/2020    PROT 7.1 05/27/2020    LABALBU 4.4 05/27/2020    CALCIUM 9.4 05/29/2020    BILITOT 0.54 05/27/2020    ALKPHOS 115 05/27/2020    AST 22 05/27/2020    ALT 16 05/27/2020       Lab Results   Component Value Date/Time    SPECIAL R AC 6 PURPLE 3 RED 05/29/2020 01:00 PM     Lab Results   Component Value Date/Time    CULTURE NO GROWTH 2 DAYS 05/29/2020 01:00 PM         Radiology:    Xr Chest Portable    Result Date: 5/30/2020  EXAMINATION: ONE XRAY VIEW OF THE CHEST 5/30/2020 3:37 pm COMPARISON: December 21, 2019 HISTORY: ORDERING SYSTEM PROVIDED HISTORY: desat'ed on RA to 85% TECHNOLOGIST PROVIDED HISTORY: desat'ed on RA to 85% Reason for Exam: PT low stats AMS Acuity: Acute Type of Exam: Initial FINDINGS: Elevation of the right hemidiaphragm. Cardiac silhouette is enlarged. No pneumothorax. No pleural effusion. No consolidation. No acute bony abnormality. No focal consolidation. No significant change in appearance of the chest.     Xr Chest Portable    Result Date: 5/28/2020  EXAM:  XR CHEST PORTABLE CLINICAL HISTORY: 70years old Female presenting with agitation. TECHNIQUE:  1 view chest x-ray. COMPARISON: Chest x-ray of 12/21/2019. FINDINGS: No pneumothorax, pleural effusion or focal airspace consolidation. Heart is normal in size. Bony thorax is unremarkable. No acute cardiopulmonary process. Physical Examination:        Physical Exam  HENT:      Head: Normocephalic and atraumatic. Cardiovascular:      Rate and Rhythm: Normal rate. Pulses: Normal pulses. Heart sounds: Normal heart sounds. Pulmonary:      Effort: Pulmonary effort is normal. No respiratory distress. Breath sounds: Normal breath sounds. No wheezing. Abdominal:      Palpations: Abdomen is soft. Tenderness: There is no abdominal tenderness. There is no guarding. Skin:     General: Skin is warm and dry. Neurological:      Mental Status: She is alert.

## 2020-05-31 NOTE — PROGRESS NOTES
enlarged, symmetric, no tenderness/mass/nodules  Lungs: clear to auscultation bilaterally  Heart: regular S! And S2, no murmur  Abdomen: soft, non-tender; bowel sounds normal; no masses,  no organomegaly  Extremities: extremities normal, atraumatic, no cyanosis or edema      Echo 2/2018  Summary  Left ventricle is normal in size. Moderate left ventricular hypertrophy. Global left ventricular systolic function is normal with an estimated  ejection fraction of 60 % . Left atrium is severely dilated. Right atrium is moderately dilated . Mildly dilated right ventricular cavity. Aortic valve leaflets are mildly thickened. No aortic stenosis. Mitral annular calcification is seen. Calcification of the mitral valve: Moderate calcification of the posterior  leaflet . Mild to moderate mitral regurgitation.     In summary, she has normal LV function with EF 60%. MV calcification, but no MS. Moderate MR. Right sided chambers mildly enlarged  LA severely enlarged, which is probably the cause of her atrial  fibrillation.           Assessment / Acute Cardiac Problems/Plan   1. PAF. Back to NSR. Will start PO amiodarone 200 mg po BID and Cardizem 120  mg po daily. No anticoagulation due to fall risk. D/C Cardizem drip. 2. UTI. On antibiotics  3. Delirium. Followed by neurology. Can follow with her Cardiologist as outpatient.          Christy Gibbs MD  [facility]  [phone]

## 2020-05-31 NOTE — PROGRESS NOTES
[facility]                     NEUROLOGY PROGRESS NOTE    PATIENT NAME: Lyndon Talavera   PATIENT MRN: [id number]   PRIMARY CARE PHYSICIAN: Ricardo Marion MD  DATE OF SERVICE: 5/31/2020    CHIEF COMPLAINT: None     SUBJECTIVE:    Pt still confused, oriented to self and hospital, telles only. Per sitter, in the morning, patient was agitated, tried to pull out zapata. HPI  Lyndon Talavera is a 70year old WF who was admitted for delirium.      Pt has baseline dementia but not sure how severe it is.      Pt does have stroke risk factors, she had afib s/p cardioversion in 4/2018. She has been followed with cardiology, last visit was in 3/2020, per note at that time, pt had falls, so Eliquis was discontinued. Currently on aspirin only. Cardiology consulted for afib with RVR, rate controlled with IV cardizem.  No head imagine was done on this admission.      UA on this admission 1+ leukocyte esterase,  On antibiotics.      MRI brain pending    PAST MEDICAL HISTORY:         Diagnosis Date    Anemia     Chicken pox     Chronic back pain     Dementia (HCC)     Diabetes mellitus (HCC)     Gastroesophageal reflux disease without esophagitis 12/7/2017    Headache     Hypertension     Measles     Mumps     Osteoarthritis     Whooping cough        MEDICATIONS:     Current Facility-Administered Medications   Medication Dose Route Frequency Provider Last Rate Last Dose    amiodarone (CORDARONE) tablet 200 mg  200 mg Oral BID Jesica Smith MD   200 mg at 05/31/20 0941    dilTIAZem (CARDIZEM CD) extended release capsule 120 mg  120 mg Oral Daily Jesica Smith MD   120 mg at 05/31/20 0941    cefTRIAXone (ROCEPHIN) 1 g IVPB in 50 mL D5W minibag  1 g Intravenous Q24H David Luna  mL/hr at 05/31/20 1510 1 g at 05/31/20 1510    LORazepam (ATIVAN) injection 2 mg  2 mg Intravenous Once Gretta García MD        metoprolol (LOPRESSOR) injection 5 mg  5 mg Intravenous Q6H PRN Anne Merlin, APRMATILDA - CNP   5 mg at 05/30/20 0542    magnesium sulfate 1 g in dextrose 5% 100 mL IVPB  1 g Intravenous PRN Anne Merlin, APRN - CNP   Stopped at 05/29/20 2030    insulin lispro (HUMALOG) injection vial 0-12 Units  0-12 Units Subcutaneous TID WC Darryl Tapiaing, PA   2 Units at 05/31/20 1213    insulin lispro (HUMALOG) injection vial 0-6 Units  0-6 Units Subcutaneous Nightly Darryl [place], PA   1 Units at 05/30/20 2110    glucose (GLUTOSE) 40 % oral gel 15 g  15 g Oral PRN Darryl [place], PA        dextrose 50 % IV solution  12.5 g Intravenous PRN Darryl [place], PA        glucagon (rDNA) injection 1 mg  1 mg Intramuscular PRN Augie [place], PA        dextrose 5 % solution  100 mL/hr Intravenous PRN Darryl [place], PA        sodium chloride flush 0.9 % injection 10 mL  10 mL Intravenous 2 times per day Darryl [place], PA   10 mL at 05/30/20 4889    sodium chloride flush 0.9 % injection 10 mL  10 mL Intravenous PRN Darryl [place], PA        acetaminophen (TYLENOL) tablet 650 mg  650 mg Oral Q6H PRN Darryl [place], PA        Or    acetaminophen (TYLENOL) suppository 650 mg  650 mg Rectal Q6H PRN Darryl [place], PA   650 mg at 05/29/20 1259    polyethylene glycol (GLYCOLAX) packet 17 g  17 g Oral Daily PRN Darryl [place], PA   17 g at 05/31/20 1419    promethazine (PHENERGAN) tablet 12.5 mg  12.5 mg Oral Q6H PRN Darryl [place], PA        Or    ondansetron [facility]) injection 4 mg  4 mg Intravenous Q6H PRN Darryl Tapiaing, PA        0.9 % sodium chloride infusion   Intravenous Continuous Blair Smith  mL/hr at 05/31/20 1347      haloperidol lactate (HALDOL) injection 2 mg  2 mg Intramuscular Q6H PRN Darryl [place], PA   2 mg at 05/30/20 0443    enoxaparin (LOVENOX) injection 30 mg  30 mg Subcutaneous BID Darryl [place], PA   30 mg at 05/31/20 0941    LORazepam (ATIVAN) injection 1 mg  1 mg

## 2020-05-31 NOTE — PROGRESS NOTES
50 % IV solution  12.5 g Intravenous PRN LIV Ware        glucagon (rDNA) injection 1 mg  1 mg Intramuscular PRN LIV Ware        dextrose 5 % solution  100 mL/hr Intravenous PRN LIV Ware        sodium chloride flush 0.9 % injection 10 mL  10 mL Intravenous 2 times per day LIV Ware   10 mL at 05/30/20 5190    sodium chloride flush 0.9 % injection 10 mL  10 mL Intravenous PRN LIV Ware        acetaminophen (TYLENOL) tablet 650 mg  650 mg Oral Q6H PRN LIV Ware        Or    acetaminophen (TYLENOL) suppository 650 mg  650 mg Rectal Q6H PRN LIV Ware   650 mg at 05/29/20 1259    polyethylene glycol (GLYCOLAX) packet 17 g  17 g Oral Daily PRN LIV Ware        promethazine (PHENERGAN) tablet 12.5 mg  12.5 mg Oral Q6H PRN LIV Ware        Or    ondansetron [facility]) injection 4 mg  4 mg Intravenous Q6H PRN LIV Ware        0.9 % sodium chloride infusion   Intravenous Continuous Deb Kowalski  mL/hr at 05/30/20 1502      haloperidol lactate (HALDOL) injection 2 mg  2 mg Intramuscular Q6H PRN LIV Ware   2 mg at 05/30/20 0443    enoxaparin (LOVENOX) injection 30 mg  30 mg Subcutaneous BID LIV Ware   30 mg at 05/30/20 0820    LORazepam (ATIVAN) injection 1 mg  1 mg Intravenous Q4H PRN Deb Kowalski MD   1 mg at 05/30/20 0902    DULoxetine (CYMBALTA) extended release capsule 30 mg  30 mg Oral Nightly Deb Kowalski MD   30 mg at 05/29/20 2011    famotidine (PEPCID) tablet 20 mg  20 mg Oral Nightly PRN Deb Kowalski MD        therapeutic multivitamin-minerals 1 tablet  1 tablet Oral Daily Deb Kowalski MD   1 tablet at 05/30/20 0820    hydrALAZINE (APRESOLINE) injection 10 mg  10 mg Intravenous Q6H PRN LIV Ware   10 mg at 05/28/20 2046        ALLERGIES:     Allergies   Allergen Reactions    Penicillins Hives    Sulfa Antibiotics Hives    Aspirin Other

## 2020-05-31 NOTE — PLAN OF CARE
injury  Description: Absence of physical injury  5/31/2020 0325 by Eddie Jennings RN  Outcome: Ongoing  Note: One-on-one at bedside for safety, due to pt's confusion. Bed remains in lowest position, call light within reach. Patient remains free of falls at this time. RN will continue to monitor. Problem: Falls - Risk of:  Goal: Will remain free from falls  Description: Will remain free from falls  5/31/2020 0325 by Eddie Jennings RN  Outcome: Ongoing     Problem: Skin Integrity:  Goal: Will show no infection signs and symptoms  Description: Will show no infection signs and symptoms  5/31/2020 0325 by Eddie Jennings RN  Outcome: Ongoing  Note: Patient turned and repositioned every 2 hours and as needed for comfort. Skin remains dry and intact. No new skin breakdown noted.       Problem: Skin Integrity:  Goal: Absence of new skin breakdown  Description: Absence of new skin breakdown  5/31/2020 0325 by Eddie Jennings RN  Outcome: Ongoing

## 2020-05-31 NOTE — PROGRESS NOTES
of clubs or organizations: Not on file     Relationship status: Not on file    Intimate partner violence     Fear of current or ex partner: Not on file     Emotionally abused: Not on file     Physically abused: Not on file     Forced sexual activity: Not on file   Other Topics Concern    Not on file   Social History Narrative    Not on file       Family History:     Family History   Problem Relation Age of Onset    High Blood Pressure Mother     Parkinsonism Mother     High Blood Pressure Father     Diabetes Father     Heart Attack Father     Diabetes Sister     High Blood Pressure Sister     Arrhythmia Sister     Diabetes Brother     High Blood Pressure Brother         Allergies:   Penicillins; Sulfa antibiotics; and Aspirin     Review of Systems:     Confused, unable to provide    Physical Examination :     Patient Vitals for the past 8 hrs:   BP Temp Temp src Pulse Resp SpO2   05/31/20 1315 (!) 149/70 97.9 °F (36.6 °C) Axillary 89 19 98 %       Physical Exam  Constitutional:       General: She is not in acute distress. Comments: Confused   Eyes:      Conjunctiva/sclera: Conjunctivae normal.   Neck:      Musculoskeletal: No neck rigidity. Cardiovascular:      Heart sounds: Normal heart sounds. No murmur. Pulmonary:      Effort: Pulmonary effort is normal. No respiratory distress. Breath sounds: No wheezing or rales. Abdominal:      General: Abdomen is flat. Palpations: Abdomen is soft. Tenderness: There is no abdominal tenderness. Musculoskeletal:      Right lower leg: No edema. Left lower leg: No edema. Skin:     Coloration: Skin is not jaundiced. Findings: No rash. Neurological:      Mental Status: She is alert.       Comments: Oriented to person, disoriented to place and time           Medical Decision Making:   I have independently reviewed/ordered the following labs:    CBC with Differential:   No results for input(s): WBC, HGB, HCT, PLT, SEGSPCT, BANDSPCT,

## 2020-06-01 ENCOUNTER — APPOINTMENT (OUTPATIENT)
Dept: MRI IMAGING | Age: 80
DRG: 871 | End: 2020-06-01
Attending: INTERNAL MEDICINE
Payer: MEDICARE

## 2020-06-01 LAB
AMMONIA: 39 UMOL/L (ref 11–51)
GLUCOSE BLD-MCNC: 139 MG/DL (ref 65–105)
GLUCOSE BLD-MCNC: 157 MG/DL (ref 65–105)
GLUCOSE BLD-MCNC: 173 MG/DL (ref 65–105)
GLUCOSE BLD-MCNC: 205 MG/DL (ref 65–105)
TSH SERPL DL<=0.05 MIU/L-ACNC: 2.3 MIU/L (ref 0.3–5)

## 2020-06-01 PROCEDURE — 2580000003 HC RX 258: Performed by: STUDENT IN AN ORGANIZED HEALTH CARE EDUCATION/TRAINING PROGRAM

## 2020-06-01 PROCEDURE — 36415 COLL VENOUS BLD VENIPUNCTURE: CPT

## 2020-06-01 PROCEDURE — 2500000003 HC RX 250 WO HCPCS: Performed by: NURSE PRACTITIONER

## 2020-06-01 PROCEDURE — 99232 SBSQ HOSP IP/OBS MODERATE 35: CPT | Performed by: PSYCHIATRY & NEUROLOGY

## 2020-06-01 PROCEDURE — 6360000002 HC RX W HCPCS: Performed by: STUDENT IN AN ORGANIZED HEALTH CARE EDUCATION/TRAINING PROGRAM

## 2020-06-01 PROCEDURE — 82140 ASSAY OF AMMONIA: CPT

## 2020-06-01 PROCEDURE — 95819 EEG AWAKE AND ASLEEP: CPT

## 2020-06-01 PROCEDURE — 6370000000 HC RX 637 (ALT 250 FOR IP): Performed by: INTERNAL MEDICINE

## 2020-06-01 PROCEDURE — 99233 SBSQ HOSP IP/OBS HIGH 50: CPT | Performed by: INTERNAL MEDICINE

## 2020-06-01 PROCEDURE — 2580000003 HC RX 258: Performed by: INTERNAL MEDICINE

## 2020-06-01 PROCEDURE — 84443 ASSAY THYROID STIM HORMONE: CPT

## 2020-06-01 PROCEDURE — 6370000000 HC RX 637 (ALT 250 FOR IP): Performed by: PSYCHIATRY & NEUROLOGY

## 2020-06-01 PROCEDURE — 6360000002 HC RX W HCPCS: Performed by: PHYSICIAN ASSISTANT

## 2020-06-01 PROCEDURE — 2580000003 HC RX 258: Performed by: PHYSICIAN ASSISTANT

## 2020-06-01 PROCEDURE — 99232 SBSQ HOSP IP/OBS MODERATE 35: CPT | Performed by: INTERNAL MEDICINE

## 2020-06-01 PROCEDURE — 95819 EEG AWAKE AND ASLEEP: CPT | Performed by: PSYCHIATRY & NEUROLOGY

## 2020-06-01 PROCEDURE — 2060000000 HC ICU INTERMEDIATE R&B

## 2020-06-01 PROCEDURE — 6370000000 HC RX 637 (ALT 250 FOR IP): Performed by: STUDENT IN AN ORGANIZED HEALTH CARE EDUCATION/TRAINING PROGRAM

## 2020-06-01 PROCEDURE — 82947 ASSAY GLUCOSE BLOOD QUANT: CPT

## 2020-06-01 PROCEDURE — 6370000000 HC RX 637 (ALT 250 FOR IP): Performed by: PHYSICIAN ASSISTANT

## 2020-06-01 PROCEDURE — 6360000002 HC RX W HCPCS: Performed by: PSYCHIATRY & NEUROLOGY

## 2020-06-01 RX ORDER — LORAZEPAM 2 MG/ML
1 INJECTION INTRAMUSCULAR ONCE
Status: DISCONTINUED | OUTPATIENT
Start: 2020-06-01 | End: 2020-06-01

## 2020-06-01 RX ORDER — RISPERIDONE 1 MG/1
0.5 TABLET, FILM COATED ORAL 2 TIMES DAILY
Status: DISCONTINUED | OUTPATIENT
Start: 2020-06-01 | End: 2020-06-02

## 2020-06-01 RX ORDER — ZIPRASIDONE MESYLATE 20 MG/ML
20 INJECTION, POWDER, LYOPHILIZED, FOR SOLUTION INTRAMUSCULAR ONCE
Status: COMPLETED | OUTPATIENT
Start: 2020-06-01 | End: 2020-06-01

## 2020-06-01 RX ORDER — DOCUSATE SODIUM 100 MG/1
100 CAPSULE, LIQUID FILLED ORAL DAILY
Status: DISCONTINUED | OUTPATIENT
Start: 2020-06-02 | End: 2020-06-05 | Stop reason: HOSPADM

## 2020-06-01 RX ORDER — LOSARTAN POTASSIUM 25 MG/1
25 TABLET ORAL DAILY
Status: DISCONTINUED | OUTPATIENT
Start: 2020-06-01 | End: 2020-06-02

## 2020-06-01 RX ORDER — DILTIAZEM HYDROCHLORIDE 180 MG/1
180 CAPSULE, COATED, EXTENDED RELEASE ORAL DAILY
Status: DISCONTINUED | OUTPATIENT
Start: 2020-06-02 | End: 2020-06-05 | Stop reason: HOSPADM

## 2020-06-01 RX ADMIN — DULOXETINE 30 MG: 30 CAPSULE, DELAYED RELEASE ORAL at 20:14

## 2020-06-01 RX ADMIN — DILTIAZEM HYDROCHLORIDE 120 MG: 120 CAPSULE, COATED, EXTENDED RELEASE ORAL at 08:57

## 2020-06-01 RX ADMIN — INSULIN LISPRO 2 UNITS: 100 INJECTION, SOLUTION INTRAVENOUS; SUBCUTANEOUS at 17:18

## 2020-06-01 RX ADMIN — MULTIPLE VITAMINS W/ MINERALS TAB 1 TABLET: TAB at 08:57

## 2020-06-01 RX ADMIN — ENOXAPARIN SODIUM 30 MG: 30 INJECTION SUBCUTANEOUS at 08:58

## 2020-06-01 RX ADMIN — RISPERIDONE 0.5 MG: 1 TABLET ORAL at 15:37

## 2020-06-01 RX ADMIN — INSULIN LISPRO 2 UNITS: 100 INJECTION, SOLUTION INTRAVENOUS; SUBCUTANEOUS at 09:03

## 2020-06-01 RX ADMIN — ACETAMINOPHEN 650 MG: 325 TABLET, FILM COATED ORAL at 02:37

## 2020-06-01 RX ADMIN — AMIODARONE HYDROCHLORIDE 200 MG: 200 TABLET ORAL at 20:14

## 2020-06-01 RX ADMIN — LOSARTAN POTASSIUM 25 MG: 25 TABLET, FILM COATED ORAL at 12:54

## 2020-06-01 RX ADMIN — RISPERIDONE 0.5 MG: 1 TABLET ORAL at 20:14

## 2020-06-01 RX ADMIN — ZIPRASIDONE MESYLATE 20 MG: 20 INJECTION, POWDER, LYOPHILIZED, FOR SOLUTION INTRAMUSCULAR at 18:56

## 2020-06-01 RX ADMIN — POLYETHYLENE GLYCOL 3350 17 G: 17 POWDER, FOR SOLUTION ORAL at 14:38

## 2020-06-01 RX ADMIN — SODIUM CHLORIDE: 9 INJECTION, SOLUTION INTRAVENOUS at 16:17

## 2020-06-01 RX ADMIN — HALOPERIDOL LACTATE 2 MG: 5 INJECTION, SOLUTION INTRAMUSCULAR at 15:42

## 2020-06-01 RX ADMIN — WATER 1.2 ML: 1 INJECTION INTRAMUSCULAR; INTRAVENOUS; SUBCUTANEOUS at 18:55

## 2020-06-01 RX ADMIN — INSULIN LISPRO 4 UNITS: 100 INJECTION, SOLUTION INTRAVENOUS; SUBCUTANEOUS at 12:54

## 2020-06-01 RX ADMIN — SODIUM CHLORIDE: 9 INJECTION, SOLUTION INTRAVENOUS at 06:41

## 2020-06-01 RX ADMIN — METOPROLOL TARTRATE 5 MG: 1 INJECTION, SOLUTION INTRAVENOUS at 23:22

## 2020-06-01 RX ADMIN — AMIODARONE HYDROCHLORIDE 200 MG: 200 TABLET ORAL at 08:57

## 2020-06-01 RX ADMIN — CEFTRIAXONE SODIUM 1 G: 1 INJECTION, POWDER, FOR SOLUTION INTRAMUSCULAR; INTRAVENOUS at 14:29

## 2020-06-01 RX ADMIN — ENOXAPARIN SODIUM 30 MG: 30 INJECTION SUBCUTANEOUS at 20:14

## 2020-06-01 RX ADMIN — ACETAMINOPHEN 650 MG: 325 TABLET, FILM COATED ORAL at 18:07

## 2020-06-01 RX ADMIN — LORAZEPAM 2 MG: 2 INJECTION INTRAMUSCULAR; INTRAVENOUS at 11:00

## 2020-06-01 RX ADMIN — HYDRALAZINE HYDROCHLORIDE 10 MG: 20 INJECTION, SOLUTION INTRAMUSCULAR; INTRAVENOUS at 02:37

## 2020-06-01 RX ADMIN — Medication 10 ML: at 20:21

## 2020-06-01 ASSESSMENT — PAIN SCALES - GENERAL
PAINLEVEL_OUTOF10: 2
PAINLEVEL_OUTOF10: 2
PAINLEVEL_OUTOF10: 4
PAINLEVEL_OUTOF10: 7

## 2020-06-01 ASSESSMENT — ENCOUNTER SYMPTOMS
VOMITING: 0
CONSTIPATION: 1
SHORTNESS OF BREATH: 0
NAUSEA: 0
ABDOMINAL PAIN: 0

## 2020-06-01 NOTE — PLAN OF CARE
Problem: Confusion - Acute:  Goal: Absence of continued neurological deterioration signs and symptoms  Description: Absence of continued neurological deterioration signs and symptoms  6/1/2020 1648 by Armida Palmer RN  Outcome: Ongoing     Problem: Confusion - Acute:  Goal: Mental status will be restored to baseline  Description: Mental status will be restored to baseline  Outcome: Ongoing     Problem: Discharge Planning:  Goal: Ability to perform activities of daily living will improve  Description: Ability to perform activities of daily living will improve  Outcome: Ongoing     Problem: Discharge Planning:  Goal: Participates in care planning  Description: Participates in care planning  Outcome: Ongoing     Problem: Injury - Risk of, Physical Injury:  Goal: Absence of physical injury  Description: Absence of physical injury  6/1/2020 1648 by Armida Palmer RN  Outcome: Ongoing     Problem: Injury - Risk of, Physical Injury:  Goal: Will remain free from falls  Description: Will remain free from falls  Outcome: Ongoing     Problem: Mood - Altered:  Goal: Mood stable  Description: Mood stable  6/1/2020 1648 by Armida Palmer RN  Outcome: Ongoing     Problem: Mood - Altered:  Goal: Absence of abusive behavior  Description: Absence of abusive behavior  Outcome: Ongoing     Problem: Mood - Altered:  Goal: Verbalizations of feeling emotionally comfortable while being cared for will increase  Description: Verbalizations of feeling emotionally comfortable while being cared for will increase  Outcome: Ongoing     Problem: Psychomotor Activity - Altered:  Goal: Absence of psychomotor disturbance signs and symptoms  Description: Absence of psychomotor disturbance signs and symptoms  Outcome: Ongoing     Problem: Sensory Perception - Impaired:  Goal: Demonstrations of improved sensory functioning will increase  Description: Demonstrations of improved sensory functioning will increase  Outcome: Ongoing

## 2020-06-01 NOTE — PROGRESS NOTES
Pt's BP was 203/91. RN gave 10mg hydralazine. RN rechecked BP 30 minutes later and BP dropped to 175/65.

## 2020-06-01 NOTE — PROGRESS NOTES
[facility] -   Infectious diseases evaluation  admission date 5/28/2020      Impression :   Current:  · Encephalopathy improved  ·  UTI  · Fever  · Atrial fibrillation with rapid ventricular response  · Diabetes mellitus  · Hypertension  · GERD    · Penicillin and sulfa allergy    Recommendations   ·   Levaquin discontinued due to potential QT prolongation started on IV ceftriaxone, may change to oral Keflex on discharge until 6/7/2020  · Chest x-ray no infiltrate  · Procalcitonin level 0.11 on 5/30/2020,   · Oral amiodarone and Cardizem  · Follow cultures  · Follow CBC renal function  · MRI of the brain could not be done due to agitation  · Discussed with internal medicine resident and daughter at bedside. History of Present Illness:   Initial history:  Marci Gonzalez is a 70y.o.-year-old female was transferred from [facility] for reported agitation , acute delirium. She did have urinary retention, Gunderson catheter was placed with reported 750 cc of urine output  COVID-19 test was negative  Initial urinalysis on 5/2720 showed 5-10 WBC, +1 leukocyte esterase  Initial labs showed lactic acid of 2.8 then normalized, WBC normal, renal function normal  She was treated with IV antibiotics initially  Blood and urine culture on admission no growth to date and her antibiotics was changed to oral Levaquin. History of atrial fibrillation status post cardioversion 2018  Interval changes  6/1/2020   She is less confused today, oriented to person, place and time, complaining of neck pain that is chronic according to her daughter at the bedside, denied any pain, no reported vomiting or diarrhea. No reported fever. I have personally reviewed the past medical history, past surgical history, medications, social history, and family history, and I haveupdated the database accordingly.   Past Medical History:     Past Medical History:   Diagnosis Date    Anemia  Chicken pox     Chronic back pain     Dementia (HCC)     Diabetes mellitus ([facility] [address]     Gastroesophageal reflux disease without esophagitis 12/7/2017    Headache     Hypertension     Measles     Mumps     Osteoarthritis     Whooping cough        Past Surgical  History:     Past Surgical History:   Procedure Laterality Date    CARPAL TUNNEL RELEASE Left 2005    JOINT REPLACEMENT Right 2004    right knee    JOINT REPLACEMENT Left 2005    left knee    JOINT REPLACEMENT Right 2016    right knee    JOINT REPLACEMENT Right 2001    right hip    JOINT REPLACEMENT Left 2006    left hip    TOE SURGERY Left 2006    joint removed from 2nd toe       Medications:      [START ON 6/2/2020] dilTIAZem  180 mg Oral Daily    losartan  25 mg Oral Daily    amiodarone  200 mg Oral BID    cefTRIAXone (ROCEPHIN) IV  1 g Intravenous Q24H    insulin lispro  0-12 Units Subcutaneous TID WC    insulin lispro  0-6 Units Subcutaneous Nightly    sodium chloride flush  10 mL Intravenous 2 times per day    enoxaparin  30 mg Subcutaneous BID    DULoxetine  30 mg Oral Nightly    therapeutic multivitamin-minerals  1 tablet Oral Daily       Social History:     Social History     Socioeconomic History    Marital status:      Spouse name: Not on file    Number of children: Not on file    Years of education: Not on file    Highest education level: Not on file   Occupational History    Not on file   Social Needs    Financial resource strain: Not on file    Food insecurity     Worry: Not on file     Inability: Not on file    Transportation needs     Medical: Not on file     Non-medical: Not on file   Tobacco Use    Smoking status: Never Smoker    Smokeless tobacco: Never Used   Substance and Sexual Activity    Alcohol use: No    Drug use: No    Sexual activity: Not on file   Lifestyle    Physical activity     Days per week: Not on file     Minutes per session: Not on file    Stress: Not on file   Relationships  Social connections     Talks on phone: Not on file     Gets together: Not on file     Attends Judaism service: Not on file     Active member of club or organization: Not on file     Attends meetings of clubs or organizations: Not on file     Relationship status: Not on file    Intimate partner violence     Fear of current or ex partner: Not on file     Emotionally abused: Not on file     Physically abused: Not on file     Forced sexual activity: Not on file   Other Topics Concern    Not on file   Social History Narrative    Not on file       Family History:     Family History   Problem Relation Age of Onset    High Blood Pressure Mother     Parkinsonism Mother     High Blood Pressure Father     Diabetes Father     Heart Attack Father     Diabetes Sister     High Blood Pressure Sister     Arrhythmia Sister     Diabetes Brother     High Blood Pressure Brother         Allergies:   Penicillins; Sulfa antibiotics; and Aspirin     Review of Systems:     As per history of present illness other than above 12 systems reviewed were negative    Physical Examination :     Patient Vitals for the past 8 hrs:   BP Temp Temp src Pulse Resp SpO2   06/01/20 1238 (!) 180/89 97.8 °F (36.6 °C) Oral 101 18 98 %   06/01/20 1106 (!) 168/73 -- -- 91 -- --   06/01/20 0715 (!) 163/86 98.3 °F (36.8 °C) Oral 108 20 100 %       Physical Exam  Constitutional:       General: She is not in acute distress. Comments: Confused   Eyes:      Conjunctiva/sclera: Conjunctivae normal.   Neck:      Musculoskeletal: No neck rigidity. Cardiovascular:      Heart sounds: Normal heart sounds. No murmur. Pulmonary:      Effort: Pulmonary effort is normal. No respiratory distress. Breath sounds: No wheezing or rales. Abdominal:      General: Abdomen is flat. Palpations: Abdomen is soft. Tenderness: There is no abdominal tenderness. Musculoskeletal:      Right lower leg: No edema. Left lower leg: No edema.

## 2020-06-01 NOTE — PROGRESS NOTES
RN agrees with [facility] RNs charting.   Electronically signed by Ayleen Brooks RN on 6/1/2020 at 6:07 AM

## 2020-06-01 NOTE — PROGRESS NOTES
2020] dilTIAZem  180 mg Oral Daily    losartan  25 mg Oral Daily    amiodarone  200 mg Oral BID    cefTRIAXone (ROCEPHIN) IV  1 g Intravenous Q24H    insulin lispro  0-12 Units Subcutaneous TID WC    insulin lispro  0-6 Units Subcutaneous Nightly    sodium chloride flush  10 mL Intravenous 2 times per day    enoxaparin  30 mg Subcutaneous BID    DULoxetine  30 mg Oral Nightly    therapeutic multivitamin-minerals  1 tablet Oral Daily     Continuous Infusions:   dextrose      sodium chloride 125 mL/hr at 20 0641     PRN Meds:.metoprolol, magnesium sulfate, glucose, dextrose, glucagon (rDNA), dextrose, sodium chloride flush, acetaminophen **OR** acetaminophen, polyethylene glycol, promethazine **OR** ondansetron, haloperidol lactate, LORazepam, famotidine, hydrALAZINE    CONSTITUTIONAL: AOx4, no apparent distress, appears stated age   HEAD: normocephalic, atraumatic   EYES: PERRLA, EOMI   ENT: moist mucous membranes, uvula midline   NECK: symmetric, no midline tenderness to palpation   LUNGS: clear to auscultation bilaterally   CARDIOVASCULAR: regular rate and rhythm, no murmurs, rubs or gallops   ABDOMEN: Soft, non-tender, non-distended with normal active bowel sounds   SKIN: no rash       EK20 NSR      Echo 2018  Summary  Left ventricle is normal in size. Moderate left ventricular hypertrophy. Global left ventricular systolic function is normal with an estimated  ejection fraction of 60 % . Left atrium is severely dilated. Right atrium is moderately dilated . Mildly dilated right ventricular cavity. Aortic valve leaflets are mildly thickened. No aortic stenosis. Mitral annular calcification is seen. Calcification of the mitral valve: Moderate calcification of the posterior  leaflet . Mild to moderate mitral regurgitation.     In summary, she has normal LV function with EF 60%. MV calcification, but no MS. Moderate MR.   Right sided chambers mildly enlarged  LA severely enlarged, which is probably the cause of her atrial  fibrillation.       Stress Test: not obtained. Cardiac Angiography: not obtained.             Assessment / Acute Cardiac Problems:       Patient Active Problem List:     Essential hypertension     Gastroesophageal reflux disease without esophagitis     Type 2 diabetes mellitus without complication (HCC)     Chronic pain of both shoulders     Anxiety     Atrial fibrillation with RVR (HCC)     Nonexudative age-related macular degeneration, bilateral, early dry stage     Delirium     Encephalopathy, unspecified      Plan of Treatment:   PAF has been in normal sinus rhythm continue calcium channel blockers  Not on anticoagulation due to his frequent fall  Hypertension on the higher side will increase Cardizem 280 daily with parameters  Delirium followed by neurology    Yadira Vera [facility] Cardiology  [phone]

## 2020-06-02 LAB
ABSOLUTE EOS #: 0.1 K/UL (ref 0–0.4)
ABSOLUTE IMMATURE GRANULOCYTE: ABNORMAL K/UL (ref 0–0.3)
ABSOLUTE LYMPH #: 1 K/UL (ref 1–4.8)
ABSOLUTE MONO #: 0.8 K/UL (ref 0.1–1.3)
ANION GAP SERPL CALCULATED.3IONS-SCNC: 13 MMOL/L (ref 9–17)
BASOPHILS # BLD: 0 % (ref 0–2)
BASOPHILS ABSOLUTE: 0 K/UL (ref 0–0.2)
BUN BLDV-MCNC: 6 MG/DL (ref 8–23)
BUN/CREAT BLD: ABNORMAL (ref 9–20)
CALCIUM SERPL-MCNC: 9.1 MG/DL (ref 8.6–10.4)
CHLORIDE BLD-SCNC: 98 MMOL/L (ref 98–107)
CO2: 26 MMOL/L (ref 20–31)
CREAT SERPL-MCNC: <0.4 MG/DL (ref 0.5–0.9)
CULTURE: NORMAL
DIFFERENTIAL TYPE: ABNORMAL
EOSINOPHILS RELATIVE PERCENT: 1 % (ref 0–4)
GFR AFRICAN AMERICAN: ABNORMAL ML/MIN
GFR NON-AFRICAN AMERICAN: ABNORMAL ML/MIN
GFR SERPL CREATININE-BSD FRML MDRD: ABNORMAL ML/MIN/{1.73_M2}
GFR SERPL CREATININE-BSD FRML MDRD: ABNORMAL ML/MIN/{1.73_M2}
GLUCOSE BLD-MCNC: 169 MG/DL (ref 65–105)
GLUCOSE BLD-MCNC: 174 MG/DL (ref 65–105)
GLUCOSE BLD-MCNC: 185 MG/DL (ref 65–105)
GLUCOSE BLD-MCNC: 230 MG/DL (ref 65–105)
GLUCOSE BLD-MCNC: 247 MG/DL (ref 70–99)
HCT VFR BLD CALC: 38.6 % (ref 36–46)
HEMOGLOBIN: 13.2 G/DL (ref 12–16)
IMMATURE GRANULOCYTES: ABNORMAL %
LYMPHOCYTES # BLD: 15 % (ref 24–44)
Lab: NORMAL
MCH RBC QN AUTO: 32.5 PG (ref 26–34)
MCHC RBC AUTO-ENTMCNC: 34.2 G/DL (ref 31–37)
MCV RBC AUTO: 95 FL (ref 80–100)
MONOCYTES # BLD: 12 % (ref 1–7)
NRBC AUTOMATED: ABNORMAL PER 100 WBC
PDW BLD-RTO: 12.1 % (ref 11.5–14.9)
PLATELET # BLD: 232 K/UL (ref 150–450)
PLATELET ESTIMATE: ABNORMAL
PMV BLD AUTO: 8 FL (ref 6–12)
POTASSIUM SERPL-SCNC: 3.8 MMOL/L (ref 3.7–5.3)
RBC # BLD: 4.06 M/UL (ref 4–5.2)
RBC # BLD: ABNORMAL 10*6/UL
SEG NEUTROPHILS: 72 % (ref 36–66)
SEGMENTED NEUTROPHILS ABSOLUTE COUNT: 5 K/UL (ref 1.3–9.1)
SODIUM BLD-SCNC: 137 MMOL/L (ref 135–144)
SPECIMEN DESCRIPTION: NORMAL
WBC # BLD: 7 K/UL (ref 3.5–11)
WBC # BLD: ABNORMAL 10*3/UL

## 2020-06-02 PROCEDURE — 93005 ELECTROCARDIOGRAM TRACING: CPT | Performed by: STUDENT IN AN ORGANIZED HEALTH CARE EDUCATION/TRAINING PROGRAM

## 2020-06-02 PROCEDURE — 6370000000 HC RX 637 (ALT 250 FOR IP): Performed by: INTERNAL MEDICINE

## 2020-06-02 PROCEDURE — 99232 SBSQ HOSP IP/OBS MODERATE 35: CPT | Performed by: PSYCHIATRY & NEUROLOGY

## 2020-06-02 PROCEDURE — 82947 ASSAY GLUCOSE BLOOD QUANT: CPT

## 2020-06-02 PROCEDURE — 99233 SBSQ HOSP IP/OBS HIGH 50: CPT | Performed by: INTERNAL MEDICINE

## 2020-06-02 PROCEDURE — 6370000000 HC RX 637 (ALT 250 FOR IP): Performed by: PHYSICIAN ASSISTANT

## 2020-06-02 PROCEDURE — 6370000000 HC RX 637 (ALT 250 FOR IP): Performed by: STUDENT IN AN ORGANIZED HEALTH CARE EDUCATION/TRAINING PROGRAM

## 2020-06-02 PROCEDURE — 85025 COMPLETE CBC W/AUTO DIFF WBC: CPT

## 2020-06-02 PROCEDURE — 2500000003 HC RX 250 WO HCPCS: Performed by: NURSE PRACTITIONER

## 2020-06-02 PROCEDURE — 36415 COLL VENOUS BLD VENIPUNCTURE: CPT

## 2020-06-02 PROCEDURE — 2580000003 HC RX 258: Performed by: PHYSICIAN ASSISTANT

## 2020-06-02 PROCEDURE — 80048 BASIC METABOLIC PNL TOTAL CA: CPT

## 2020-06-02 PROCEDURE — 6360000002 HC RX W HCPCS: Performed by: PHYSICIAN ASSISTANT

## 2020-06-02 PROCEDURE — 6370000000 HC RX 637 (ALT 250 FOR IP): Performed by: PSYCHIATRY & NEUROLOGY

## 2020-06-02 PROCEDURE — 99232 SBSQ HOSP IP/OBS MODERATE 35: CPT | Performed by: INTERNAL MEDICINE

## 2020-06-02 PROCEDURE — 2060000000 HC ICU INTERMEDIATE R&B

## 2020-06-02 RX ORDER — CEPHALEXIN 250 MG/1
250 CAPSULE ORAL EVERY 6 HOURS SCHEDULED
Status: COMPLETED | OUTPATIENT
Start: 2020-06-02 | End: 2020-06-05

## 2020-06-02 RX ORDER — METOPROLOL SUCCINATE 25 MG/1
25 TABLET, EXTENDED RELEASE ORAL DAILY
Status: DISCONTINUED | OUTPATIENT
Start: 2020-06-02 | End: 2020-06-05 | Stop reason: HOSPADM

## 2020-06-02 RX ORDER — LOSARTAN POTASSIUM 50 MG/1
50 TABLET ORAL DAILY
Status: DISCONTINUED | OUTPATIENT
Start: 2020-06-03 | End: 2020-06-05 | Stop reason: HOSPADM

## 2020-06-02 RX ORDER — QUETIAPINE FUMARATE 25 MG/1
12.5 TABLET, FILM COATED ORAL DAILY
Status: DISCONTINUED | OUTPATIENT
Start: 2020-06-02 | End: 2020-06-05 | Stop reason: HOSPADM

## 2020-06-02 RX ORDER — QUETIAPINE FUMARATE 50 MG/1
25 TABLET, FILM COATED ORAL NIGHTLY
Status: DISCONTINUED | OUTPATIENT
Start: 2020-06-02 | End: 2020-06-05 | Stop reason: HOSPADM

## 2020-06-02 RX ORDER — OXYBUTYNIN CHLORIDE 5 MG/1
5 TABLET, EXTENDED RELEASE ORAL NIGHTLY
Status: DISCONTINUED | OUTPATIENT
Start: 2020-06-02 | End: 2020-06-05 | Stop reason: HOSPADM

## 2020-06-02 RX ADMIN — METOPROLOL TARTRATE 5 MG: 1 INJECTION, SOLUTION INTRAVENOUS at 06:17

## 2020-06-02 RX ADMIN — Medication 10 ML: at 21:01

## 2020-06-02 RX ADMIN — INSULIN LISPRO 2 UNITS: 100 INJECTION, SOLUTION INTRAVENOUS; SUBCUTANEOUS at 17:36

## 2020-06-02 RX ADMIN — HALOPERIDOL LACTATE 2 MG: 5 INJECTION, SOLUTION INTRAMUSCULAR at 02:35

## 2020-06-02 RX ADMIN — LOSARTAN POTASSIUM 25 MG: 25 TABLET, FILM COATED ORAL at 09:16

## 2020-06-02 RX ADMIN — OXYBUTYNIN CHLORIDE 5 MG: 5 TABLET, EXTENDED RELEASE ORAL at 21:02

## 2020-06-02 RX ADMIN — QUETIAPINE FUMARATE 25 MG: 50 TABLET ORAL at 21:01

## 2020-06-02 RX ADMIN — CEPHALEXIN 250 MG: 250 CAPSULE ORAL at 23:50

## 2020-06-02 RX ADMIN — AMIODARONE HYDROCHLORIDE 200 MG: 200 TABLET ORAL at 09:16

## 2020-06-02 RX ADMIN — DULOXETINE 30 MG: 30 CAPSULE, DELAYED RELEASE ORAL at 21:02

## 2020-06-02 RX ADMIN — INSULIN LISPRO 2 UNITS: 100 INJECTION, SOLUTION INTRAVENOUS; SUBCUTANEOUS at 11:34

## 2020-06-02 RX ADMIN — AMIODARONE HYDROCHLORIDE 200 MG: 200 TABLET ORAL at 21:02

## 2020-06-02 RX ADMIN — METOPROLOL SUCCINATE 25 MG: 25 TABLET, EXTENDED RELEASE ORAL at 14:35

## 2020-06-02 RX ADMIN — INSULIN LISPRO 4 UNITS: 100 INJECTION, SOLUTION INTRAVENOUS; SUBCUTANEOUS at 09:16

## 2020-06-02 RX ADMIN — HYDRALAZINE HYDROCHLORIDE 10 MG: 20 INJECTION, SOLUTION INTRAMUSCULAR; INTRAVENOUS at 02:46

## 2020-06-02 RX ADMIN — Medication 10 ML: at 09:22

## 2020-06-02 RX ADMIN — CEPHALEXIN 250 MG: 250 CAPSULE ORAL at 12:22

## 2020-06-02 RX ADMIN — MULTIPLE VITAMINS W/ MINERALS TAB 1 TABLET: TAB at 09:25

## 2020-06-02 RX ADMIN — DOCUSATE SODIUM 100 MG: 100 CAPSULE, LIQUID FILLED ORAL at 09:16

## 2020-06-02 RX ADMIN — RISPERIDONE 0.5 MG: 1 TABLET ORAL at 09:16

## 2020-06-02 RX ADMIN — ENOXAPARIN SODIUM 30 MG: 30 INJECTION SUBCUTANEOUS at 09:16

## 2020-06-02 RX ADMIN — ENOXAPARIN SODIUM 30 MG: 30 INJECTION SUBCUTANEOUS at 21:00

## 2020-06-02 RX ADMIN — CEPHALEXIN 250 MG: 250 CAPSULE ORAL at 17:36

## 2020-06-02 RX ADMIN — INSULIN LISPRO 1 UNITS: 100 INJECTION, SOLUTION INTRAVENOUS; SUBCUTANEOUS at 21:00

## 2020-06-02 RX ADMIN — DILTIAZEM HYDROCHLORIDE 180 MG: 180 CAPSULE, COATED, EXTENDED RELEASE ORAL at 09:16

## 2020-06-02 ASSESSMENT — ENCOUNTER SYMPTOMS
ABDOMINAL PAIN: 0
NAUSEA: 0
VOMITING: 0
DIARRHEA: 0
SHORTNESS OF BREATH: 0

## 2020-06-02 NOTE — CARE COORDINATION
SUZANNA spoke with admissions at [facility] in [address] [facility]. Both facilities will accept patient's with dementia and [facility] reported having a dementia unit. SUZANNA received request for more information and SUZANNA did fax the full referral to both of these facilities. [facility]'s fax number is [phone] and the best fax number for [facility] of [address] [facility] is [phone]. SUZANNA will hear back likely tomorrow morning from both of these facilities. SUZANNA will continue to follow.

## 2020-06-02 NOTE — PROGRESS NOTES
[facility]    PROGRESS NOTE             6/2/2020    2:52 PM    Name:   Reese Ayala  MRN:     [id number]     [place]:      [de-identified]   Room:   [address] [place] [address] Day:  5  Admit Date:  5/28/2020  6:27 AM    PCP:  Regina Horne MD  Code Status:  Full Code    Subjective:     C/C: No chief complaint on file. Interval History Status: not changed. Patient was seen and examined at bedside. Patient was apparently very agitated and anxious overnight. This morning, patient is resting comfortably in bed, is pleasant. Patient had soft restraints on as she was trying to pull her zapata out. Patient did not have any complaints. Review of Systems:     Review of Systems   Constitutional: Negative for chills and fever. Respiratory: Negative for shortness of breath. Cardiovascular: Negative for chest pain. Gastrointestinal: Negative for abdominal pain, diarrhea, nausea and vomiting. Medications: Allergies:     Allergies   Allergen Reactions    Penicillins Hives    Sulfa Antibiotics Hives    Aspirin Other (See Comments)     Nose bleeds in high doses         Current Meds:   Scheduled Meds:    [START ON 6/3/2020] losartan  50 mg Oral Daily    oxybutynin  5 mg Oral Nightly    cephALEXin  250 mg Oral 4 times per day    metoprolol succinate  25 mg Oral Daily    QUEtiapine  25 mg Oral Nightly    QUEtiapine  12.5 mg Oral Daily    dilTIAZem  180 mg Oral Daily    docusate sodium  100 mg Oral Daily    amiodarone  200 mg Oral BID    insulin lispro  0-12 Units Subcutaneous TID WC    insulin lispro  0-6 Units Subcutaneous Nightly    sodium chloride flush  10 mL Intravenous 2 times per day    enoxaparin  30 mg Subcutaneous BID    DULoxetine  30 mg Oral Nightly    therapeutic multivitamin-minerals  1 tablet Oral Daily     Continuous Infusions:    dextrose       PRN Meds: metoprolol, magnesium sulfate, glucose, dextrose, glucagon (rDNA), dextrose, sodium chloride flush, acetaminophen **OR** acetaminophen, polyethylene glycol, promethazine **OR** ondansetron, haloperidol lactate, famotidine, hydrALAZINE    Data:     Past Medical History:   has a past medical history of Anemia, Chicken pox, Chronic back pain, Dementia ([facility] [address] Diabetes mellitus ([facility] [address] Gastroesophageal reflux disease without esophagitis, Headache, Hypertension, Measles, Mumps, Osteoarthritis, and Whooping cough. Social History:   reports that she has never smoked. She has never used smokeless tobacco. She reports that she does not drink alcohol or use drugs. Family History:   Family History   Problem Relation Age of Onset    High Blood Pressure Mother     Parkinsonism Mother     High Blood Pressure Father     Diabetes Father     Heart Attack Father     Diabetes Sister     High Blood Pressure Sister     Arrhythmia Sister     Diabetes Brother     High Blood Pressure Brother        Vitals:  BP (!) 145/68   Pulse 101   Temp 98.1 °F (36.7 °C) (Oral)   Resp 20   Ht 5' 10\" (1.778 m)   Wt 246 lb 4.1 oz (111.7 kg)   SpO2 99%   BMI 35.33 kg/m²   Temp (24hrs), Av.9 °F (37.2 °C), Min:98.1 °F (36.7 °C), Max:99.4 °F (37.4 °C)    Recent Labs     20  1613 20  2017 20  0733 20  1058   POCGLU 173* 139* 230* 169*       I/O(24Hr):     Intake/Output Summary (Last 24 hours) at 2020 1452  Last data filed at 2020 0931  Gross per 24 hour   Intake 1250 ml   Output 5375 ml   Net -4125 ml       Labs:    CBC with Differential:    Lab Results   Component Value Date    WBC 7.0 2020    RBC 4.06 2020    HGB 13.2 2020    HCT 38.6 2020     2020    MCV 95.0 2020    MCH 32.5 2020    MCHC 34.2 2020    RDW 12.1 2020    LYMPHOPCT 15 2020    MONOPCT 12 2020    BASOPCT 0 2020    MONOSABS 0.80 2020    LYMPHSABS 1.00 2020    EOSABS 0.10 2020    BASOSABS 0.00 2020 Abdominal:      Palpations: Abdomen is soft. Tenderness: There is no abdominal tenderness. There is no guarding. Skin:     General: Skin is warm and dry. Neurological:      Mental Status: She is alert. Comments: Oriented to self       Assessment:        Primary Problem  Delirium    Active Hospital Problems    Diagnosis Date Noted    Encephalopathy, unspecified [G93.40]     Delirium [R41.0] 05/28/2020    [facility] [I48.91] 02/06/2018    Essential hypertension [I10] 12/07/2017    Gastroesophageal reflux disease without esophagitis [K21.9] 12/07/2017    Type 2 diabetes mellitus without complication ([facility] [address] [A99.8] 12/07/2017       Plan:        Delirium possibly 2/2 to UTI with underlying dementia - improved  - neurology onboard  - ID onboard, continue iv rocephin. Can change to PO keflex on d/c until 6/7/2020  - Urine cx NGTD  - blood cultures NGTD x 2  - UA: 1+ LE, 5-10 WBC  - haldol 2 mg IV q6H PRN  - ativan 1 mg Q4H PRN  - patient unable to complete MRI  - EEG wnl  - per neuro add seroquel 25 mg nightly, 12.5 mg qAM  - formal dementia workup outpatient     Sepsis 2/2 to likely UTI - resolved  - ID onboard  - continue abx x 7 days  - Urine cx NGTD  - blood culture NGTD  -  cc/hr  - last LA 1.0     A. Fib with RVR  - cardiology onboard  - PO cardizem 180 daily and amiodarone 200 mg PO BID  - per cardiology, no AC 2/2 fall risk     Hypertension  - increased losartan 50 mg PO daily  - PO cardizem 180 daily  - hydralazine PRN  - metoprolo PRN  - continue monitoring    Urinary retention  - had zapata catheter, will remove today and monitor UOP  - if patient continues to retain urine, will consider urology consult     GI ppx: Pepcid 20 mg nightly PRN  DVT ppx: lovenox 30 mg sq BID     IVF  cc/hr     Dispo: plan for SNF with dementia unit, SW following.     Madelaine Li MD  6/2/2020  2:52 PM

## 2020-06-02 NOTE — CARE COORDINATION
DISCHARGE PLANNING NOTE:    Plan is for this patient to go to SNF with dementia unit. LSW is following for this and working with daughter - [facility]ices are [facility] in [address] [place] [address] in [facility]. [facility] - 17%    Gunderson discontinued and restraints off.      Remains 1:1    Electronically signed by Anoop Rehman, RN on 6/2/2020 at 2:13 PM

## 2020-06-02 NOTE — PROGRESS NOTES
Patient has been progressively agitated despite redirection and other non pharm measures. Pt is pulling at lines and tubes. Pt is a safety risk to herself. Received new orders from April Chapman NP for soft risk restraints. Security called and placed restraints. Will continue to monitor.

## 2020-06-02 NOTE — PLAN OF CARE
Problem: Restraint Use - Nonviolent/Non-Self-Destructive Behavior:  Goal: Absence of restraint indications  Description: Absence of restraint indications  Outcome: Met This Shift     Problem: Restraint Use - Nonviolent/Non-Self-Destructive Behavior:  Goal: Absence of restraint-related injury  Description: Absence of restraint-related injury  Outcome: Met This Shift  Note: Restraints removed. Pt cooperative at this time. No agitation noted. Problem: Injury - Risk of, Physical Injury:  Goal: Will remain free from falls  Description: Will remain free from falls  Outcome: Ongoing  Note: Pt remains free from falls this shift. Pt has bed wheels locked, call light within reach, and fall sign posted. Pt continues to be educated about fall risks. Will continue to monitor. Problem: Confusion - Acute:  Goal: Absence of continued neurological deterioration signs and symptoms  Description: Absence of continued neurological deterioration signs and symptoms  Outcome: Ongoing     Problem: Confusion - Acute:  Goal: Mental status will be restored to baseline  Description: Mental status will be restored to baseline  Outcome: Ongoing     Problem: Discharge Planning:  Goal: Ability to perform activities of daily living will improve  Description: Ability to perform activities of daily living will improve  Outcome: Ongoing     Problem: Discharge Planning:  Goal: Participates in care planning  Description: Participates in care planning  Outcome: Ongoing     Problem: Injury - Risk of, Physical Injury:  Goal: Absence of physical injury  Description: Absence of physical injury  Outcome: Ongoing     Problem: Mood - Altered:  Goal: Mood stable  Description: Mood stable  Outcome: Ongoing  Note: Pt calm and cooperative at this time. Mood improving. Problem: Mood - Altered:  Goal: Absence of abusive behavior  Description: Absence of abusive behavior  Outcome: Ongoing  Note: Pt not exhibiting aggressive behavior this shift.  Will continue to monitor for improvements or decline. Problem: Mood - Altered:  Goal: Verbalizations of feeling emotionally comfortable while being cared for will increase  Description: Verbalizations of feeling emotionally comfortable while being cared for will increase  Outcome: Ongoing     Problem: Psychomotor Activity - Altered:  Goal: Absence of psychomotor disturbance signs and symptoms  Description: Absence of psychomotor disturbance signs and symptoms  Outcome: Ongoing     Problem: Sensory Perception - Impaired:  Goal: Demonstrations of improved sensory functioning will increase  Description: Demonstrations of improved sensory functioning will increase  Outcome: Ongoing     Problem: Sensory Perception - Impaired:  Goal: Decrease in sensory misperception frequency  Description: Decrease in sensory misperception frequency  Outcome: Ongoing     Problem: Sensory Perception - Impaired:  Goal: Able to refrain from responding to false sensory perceptions  Description: Able to refrain from responding to false sensory perceptions  Outcome: Ongoing     Problem: Sensory Perception - Impaired:  Goal: Demonstrates accurate environmental perceptions  Description: Demonstrates accurate environmental perceptions  Outcome: Ongoing     Problem: Sensory Perception - Impaired:  Goal: Able to distinguish between reality-based and nonreality-based thinking  Description: Able to distinguish between reality-based and nonreality-based thinking  Outcome: Ongoing  Note: Pt needs frequent reorientation to surroundings and present time.      Problem: Sensory Perception - Impaired:  Goal: Able to interrupt nonreality-based thinking  Description: Able to interrupt nonreality-based thinking  Outcome: Ongoing     Problem: Sleep Pattern Disturbance:  Goal: Appears well-rested  Description: Appears well-rested  Outcome: Ongoing     Problem: Falls - Risk of:  Goal: Absence of physical injury  Description: Absence of physical injury  Outcome: Ongoing     Problem: Falls - Risk of:  Goal: Will remain free from falls  Description: Will remain free from falls  Outcome: Ongoing  Note: Pt remains free from falls this shift. Pt has bed wheels locked, call light within reach, and fall sign posted. Pt continues to be educated about fall risks. Will continue to monitor. Problem: Skin Integrity:  Goal: Will show no infection signs and symptoms  Description: Will show no infection signs and symptoms  Outcome: Ongoing     Problem: Skin Integrity:  Goal: Absence of new skin breakdown  Description: Absence of new skin breakdown  Outcome: Ongoing  Note: Pt has no new signs of skin breakdown. Pt continues to be repositioned Q2hr prn. Problem: Cardiac:  Goal: Ability to maintain an adequate cardiac output will improve  Description: Ability to maintain an adequate cardiac output will improve  Outcome: Ongoing  Note: Pt showing adequate cardiac output at this time. See MAR for medications.

## 2020-06-02 NOTE — PROGRESS NOTES
[facility] -   Infectious diseases evaluation  admission date 5/28/2020      Impression :   Current:  · Encephalopathy improved  ·  UTI  · Fever  · Atrial fibrillation with rapid ventricular response  · Diabetes mellitus  · Hypertension  · GERD    · Penicillin and sulfa allergy    Recommendations   ·    IV ceftriaxone, may change to oral Keflex on discharge until 6/7/2020  · Chest x-ray no infiltrate  · Procalcitonin level 0.11 on 5/30/2020,   · Follow cultures  · Follow CBC renal function  · MRI of the brain could not be done due to agitation  · Discussed with nursing staff       History of Present Illness:   Initial history:  Blanco Beck is a 70y.o.-year-old female was transferred from [facility] for reported agitation , acute delirium. She did have urinary retention, Gunderson catheter was placed with reported 750 cc of urine output  COVID-19 test was negative  Initial urinalysis on 5/2720 showed 5-10 WBC, +1 leukocyte esterase  Initial labs showed lactic acid of 2.8 then normalized, WBC normal, renal function normal  She was treated with IV antibiotics initially  Blood and urine culture on admission no growth to date and her antibiotics was changed to oral Levaquin. History of atrial fibrillation status post cardioversion 2018  Interval changes  6/2/2020   She is more confused today than yesterday, denied any pain, no reported vomiting or diarrhea, Gunderson catheter was removed. I have personally reviewed the past medical history, past surgical history, medications, social history, and family history, and I haveupdated the database accordingly.   Past Medical History:     Past Medical History:   Diagnosis Date    Anemia     Chicken pox     Chronic back pain     Dementia ([address]     Diabetes mellitus [address] [address]     Gastroesophageal reflux disease without esophagitis 12/7/2017    Headache     Hypertension     Measles     Mumps     Osteoarthritis  Whooping cough        Past Surgical  History:     Past Surgical History:   Procedure Laterality Date    CARPAL TUNNEL RELEASE Left 2005    JOINT REPLACEMENT Right 2004    right knee    JOINT REPLACEMENT Left 2005    left knee    JOINT REPLACEMENT Right 2016    right knee    JOINT REPLACEMENT Right 2001    right hip    JOINT REPLACEMENT Left 2006    left hip    TOE SURGERY Left 2006    joint removed from 2nd toe       Medications:      [START ON 6/3/2020] losartan  50 mg Oral Daily    oxybutynin  5 mg Oral Nightly    cephALEXin  250 mg Oral 4 times per day    metoprolol succinate  25 mg Oral Daily    QUEtiapine  25 mg Oral Nightly    QUEtiapine  12.5 mg Oral Daily    dilTIAZem  180 mg Oral Daily    docusate sodium  100 mg Oral Daily    amiodarone  200 mg Oral BID    insulin lispro  0-12 Units Subcutaneous TID WC    insulin lispro  0-6 Units Subcutaneous Nightly    sodium chloride flush  10 mL Intravenous 2 times per day    enoxaparin  30 mg Subcutaneous BID    DULoxetine  30 mg Oral Nightly    therapeutic multivitamin-minerals  1 tablet Oral Daily       Social History:     Social History     Socioeconomic History    Marital status:      Spouse name: Not on file    Number of children: Not on file    Years of education: Not on file    Highest education level: Not on file   Occupational History    Not on file   Social Needs    Financial resource strain: Not on file    Food insecurity     Worry: Not on file     Inability: Not on file    Transportation needs     Medical: Not on file     Non-medical: Not on file   Tobacco Use    Smoking status: Never Smoker    Smokeless tobacco: Never Used   Substance and Sexual Activity    Alcohol use: No    Drug use: No    Sexual activity: Not on file   Lifestyle    Physical activity     Days per week: Not on file     Minutes per session: Not on file    Stress: Not on file   Relationships    Social connections     Talks on phone: Not on Left lower leg: No edema. Skin:     Coloration: Skin is not jaundiced. Findings: No rash. Neurological:      Mental Status: She is alert. Comments: Oriented to person, disoriented to place and time           Medical Decision Making:   I have independently reviewed/ordered the following labs:    CBC with Differential:   Recent Labs     06/02/20  0753   WBC 7.0   HGB 13.2   HCT 38.6      LYMPHOPCT 15*   MONOPCT 12*     BMP:  Recent Labs     06/02/20  0753      K 3.8   CL 98   CO2 26   BUN 6*   CREATININE <0.40*       Lab Results   Component Value Date    CREATININE <0.40 06/02/2020    CREATININE 0.7 09/08/2017    GLUCOSE 247 06/02/2020       Detailed results: Thank you for allowing us to participate in the care of this patient. Please call with questions. This note is created with the assistance of a speech recognition program.  While intending to generate adocument that actually reflects the content of the visit, the document can still have some errors including those of syntax and sound a like substitutions which may escape proof reading. It such instances, actual meaningcan be extrapolated by contextual diversion.     Isabel Mora MD  Office: [phone]  Perfect serve / office [phone]

## 2020-06-02 NOTE — PROGRESS NOTES
[facility]   IN-PATIENT SERVICE      NEUROLOGY PROGRESS  NOTE                Interval History:     Agitated last night and early this am.   Received Haldol, Geodon. Finally took Risperdal this morning. Currently calm in bed, confused. History of Present Illness: The patient is a 70 y.o. female who presents with altered mental status, described as increasing agitation and confusion. She stays at home, her daughter is her caregiver. Takes tramadol, Cymbalta for pain. Lactic acid initially elevated but has normalized. The patient was seen and examined and the chart was reviewed. Patient has baseline dementia although unclear baseline. On initial exam noted to be AO x2 with dysarthria. Physical Exam:   BP (!) 158/80   Pulse 114   Temp 98.5 °F (36.9 °C) (Oral)   Resp 20   Ht 5' 10\" (1.778 m)   Wt 246 lb 4.1 oz (111.7 kg)   SpO2 98%   BMI 35.33 kg/m²   Temp (24hrs), Av.8 °F (37.1 °C), Min:97.8 °F (36.6 °C), Max:99.4 °F (37.4 °C)      Neurological examination:    Mental status    Alert and oriented x 2 to self, year. Confusing family members at bedside; following all commands; speech is fluent, no dysarthria, aphasia.  Requires repetitive prompting.       Cranial nerves    II - visual fields intact to confrontation; pupils reactive  III, IV, VI - extraocular muscles intact; no TRICE; no nystagmus; no ptosis   V - normal facial sensation                                                               VII - normal facial symmetry                                                             VIII - intact hearing                                                                             IX, X - symmetrical palate elevation                                               XI - symmetrical shoulder shrug                                                       XII - midline tongue without atrophy or fasciculation      Motor function  Strength: 5/5 RUE, 3/5 RLE, 5/5 LUE, 3/5  LLE  Normal bulk and tone.   No tremors                       Sensory function Intact to touch, pin, vibration, throughout      Cerebellar Intact finger-nose-finger testing. Reflex function 1/4 symmetric throughout . Gait                  Not assessed                 Diagnostics:      Laboratory Testing:  CBC:   Recent Labs     06/02/20  0753   WBC 7.0   HGB 13.2        BMP:    Recent Labs     06/02/20  0753      K 3.8   CL 98   CO2 26   BUN 6*   CREATININE <0.40*   GLUCOSE 247*         Lab Results   Component Value Date    CHOL 192 08/13/2019    HDL 65 08/13/2019    TRIG 109 08/13/2019    ALT 16 05/27/2020    AST 22 05/27/2020    TSH 2.30 06/01/2020    INR 0.9 12/21/2019    LABA1C 5.6 08/08/2019    [id number] >2000 (H) 03/15/2019         Impression:      1. Acute delirium/worsening of underlying dementia with behavioral disturbances  2.  Diffuse cerebral atrophy, chronic ischemic white matter changes, chronic lacunar infarcts    Plan:      Discontinue Risperdal   Add Seroquel 25 mg at night, 12.5 mg every morning   Follow-up with neurology as outpatient for formal dementia work-up   We will follow      Electronically signed by Tomi Webster DO on 6/2/2020 at 11:53 AM      [facility]  Neurology

## 2020-06-02 NOTE — PROGRESS NOTES
Pt doing well without restraints. No combative or aggressive behavior. Pt calm and cooperative but still confused. Will continue to monitor for increased agitation.  Electronically signed by Santiago Love RN on 6/2/2020 at 6:38 PM

## 2020-06-03 LAB
ABSOLUTE EOS #: 0.2 K/UL (ref 0–0.4)
ABSOLUTE IMMATURE GRANULOCYTE: ABNORMAL K/UL (ref 0–0.3)
ABSOLUTE LYMPH #: 1.7 K/UL (ref 1–4.8)
ABSOLUTE MONO #: 0.6 K/UL (ref 0.1–1.3)
ANION GAP SERPL CALCULATED.3IONS-SCNC: 11 MMOL/L (ref 9–17)
BASOPHILS # BLD: 1 % (ref 0–2)
BASOPHILS ABSOLUTE: 0 K/UL (ref 0–0.2)
BUN BLDV-MCNC: 11 MG/DL (ref 8–23)
BUN/CREAT BLD: ABNORMAL (ref 9–20)
CALCIUM SERPL-MCNC: 9.5 MG/DL (ref 8.6–10.4)
CHLORIDE BLD-SCNC: 96 MMOL/L (ref 98–107)
CO2: 29 MMOL/L (ref 20–31)
CREAT SERPL-MCNC: <0.4 MG/DL (ref 0.5–0.9)
DIFFERENTIAL TYPE: ABNORMAL
EKG ATRIAL RATE: 103 BPM
EKG P AXIS: 0 DEGREES
EKG P-R INTERVAL: 142 MS
EKG Q-T INTERVAL: 394 MS
EKG QRS DURATION: 98 MS
EKG QTC CALCULATION (BAZETT): 516 MS
EKG R AXIS: 31 DEGREES
EKG T AXIS: 86 DEGREES
EKG VENTRICULAR RATE: 103 BPM
EOSINOPHILS RELATIVE PERCENT: 4 % (ref 0–4)
GFR AFRICAN AMERICAN: ABNORMAL ML/MIN
GFR NON-AFRICAN AMERICAN: ABNORMAL ML/MIN
GFR SERPL CREATININE-BSD FRML MDRD: ABNORMAL ML/MIN/{1.73_M2}
GFR SERPL CREATININE-BSD FRML MDRD: ABNORMAL ML/MIN/{1.73_M2}
GLUCOSE BLD-MCNC: 121 MG/DL (ref 65–105)
GLUCOSE BLD-MCNC: 149 MG/DL (ref 65–105)
GLUCOSE BLD-MCNC: 154 MG/DL (ref 70–99)
GLUCOSE BLD-MCNC: 179 MG/DL (ref 65–105)
GLUCOSE BLD-MCNC: 221 MG/DL (ref 65–105)
HCT VFR BLD CALC: 40.5 % (ref 36–46)
HEMOGLOBIN: 13.9 G/DL (ref 12–16)
IMMATURE GRANULOCYTES: ABNORMAL %
LYMPHOCYTES # BLD: 29 % (ref 24–44)
MAGNESIUM: 1.9 MG/DL (ref 1.6–2.6)
MCH RBC QN AUTO: 32.3 PG (ref 26–34)
MCHC RBC AUTO-ENTMCNC: 34.2 G/DL (ref 31–37)
MCV RBC AUTO: 94.5 FL (ref 80–100)
MONOCYTES # BLD: 11 % (ref 1–7)
NRBC AUTOMATED: ABNORMAL PER 100 WBC
PDW BLD-RTO: 12.1 % (ref 11.5–14.9)
PLATELET # BLD: 237 K/UL (ref 150–450)
PLATELET ESTIMATE: ABNORMAL
PMV BLD AUTO: 8.3 FL (ref 6–12)
POTASSIUM SERPL-SCNC: 3.5 MMOL/L (ref 3.7–5.3)
RBC # BLD: 4.28 M/UL (ref 4–5.2)
RBC # BLD: ABNORMAL 10*6/UL
SEG NEUTROPHILS: 55 % (ref 36–66)
SEGMENTED NEUTROPHILS ABSOLUTE COUNT: 3.1 K/UL (ref 1.3–9.1)
SODIUM BLD-SCNC: 136 MMOL/L (ref 135–144)
WBC # BLD: 5.7 K/UL (ref 3.5–11)
WBC # BLD: ABNORMAL 10*3/UL

## 2020-06-03 PROCEDURE — 6370000000 HC RX 637 (ALT 250 FOR IP): Performed by: PSYCHIATRY & NEUROLOGY

## 2020-06-03 PROCEDURE — 6370000000 HC RX 637 (ALT 250 FOR IP): Performed by: STUDENT IN AN ORGANIZED HEALTH CARE EDUCATION/TRAINING PROGRAM

## 2020-06-03 PROCEDURE — 83735 ASSAY OF MAGNESIUM: CPT

## 2020-06-03 PROCEDURE — 6370000000 HC RX 637 (ALT 250 FOR IP): Performed by: INTERNAL MEDICINE

## 2020-06-03 PROCEDURE — 99231 SBSQ HOSP IP/OBS SF/LOW 25: CPT | Performed by: PSYCHIATRY & NEUROLOGY

## 2020-06-03 PROCEDURE — 6370000000 HC RX 637 (ALT 250 FOR IP): Performed by: NURSE PRACTITIONER

## 2020-06-03 PROCEDURE — 93010 ELECTROCARDIOGRAM REPORT: CPT | Performed by: INTERNAL MEDICINE

## 2020-06-03 PROCEDURE — 6360000002 HC RX W HCPCS: Performed by: PHYSICIAN ASSISTANT

## 2020-06-03 PROCEDURE — 99231 SBSQ HOSP IP/OBS SF/LOW 25: CPT | Performed by: INTERNAL MEDICINE

## 2020-06-03 PROCEDURE — 80048 BASIC METABOLIC PNL TOTAL CA: CPT

## 2020-06-03 PROCEDURE — 99239 HOSP IP/OBS DSCHRG MGMT >30: CPT | Performed by: INTERNAL MEDICINE

## 2020-06-03 PROCEDURE — 85025 COMPLETE CBC W/AUTO DIFF WBC: CPT

## 2020-06-03 PROCEDURE — 82947 ASSAY GLUCOSE BLOOD QUANT: CPT

## 2020-06-03 PROCEDURE — 36415 COLL VENOUS BLD VENIPUNCTURE: CPT

## 2020-06-03 PROCEDURE — 6370000000 HC RX 637 (ALT 250 FOR IP): Performed by: PHYSICIAN ASSISTANT

## 2020-06-03 PROCEDURE — 2060000000 HC ICU INTERMEDIATE R&B

## 2020-06-03 RX ORDER — POTASSIUM CHLORIDE 7.45 MG/ML
10 INJECTION INTRAVENOUS PRN
Status: DISCONTINUED | OUTPATIENT
Start: 2020-06-03 | End: 2020-06-05 | Stop reason: HOSPADM

## 2020-06-03 RX ORDER — POTASSIUM CHLORIDE 20 MEQ/1
40 TABLET, EXTENDED RELEASE ORAL PRN
Status: DISCONTINUED | OUTPATIENT
Start: 2020-06-03 | End: 2020-06-05 | Stop reason: HOSPADM

## 2020-06-03 RX ADMIN — DOCUSATE SODIUM 100 MG: 100 CAPSULE, LIQUID FILLED ORAL at 08:33

## 2020-06-03 RX ADMIN — POTASSIUM BICARBONATE 40 MEQ: 782 TABLET, EFFERVESCENT ORAL at 12:26

## 2020-06-03 RX ADMIN — AMIODARONE HYDROCHLORIDE 200 MG: 200 TABLET ORAL at 08:33

## 2020-06-03 RX ADMIN — METOPROLOL SUCCINATE 25 MG: 25 TABLET, EXTENDED RELEASE ORAL at 08:33

## 2020-06-03 RX ADMIN — QUETIAPINE FUMARATE 25 MG: 50 TABLET ORAL at 20:25

## 2020-06-03 RX ADMIN — INSULIN LISPRO 1 UNITS: 100 INJECTION, SOLUTION INTRAVENOUS; SUBCUTANEOUS at 20:26

## 2020-06-03 RX ADMIN — LOSARTAN POTASSIUM 50 MG: 50 TABLET ORAL at 08:33

## 2020-06-03 RX ADMIN — CEPHALEXIN 250 MG: 250 CAPSULE ORAL at 11:42

## 2020-06-03 RX ADMIN — CEPHALEXIN 250 MG: 250 CAPSULE ORAL at 17:59

## 2020-06-03 RX ADMIN — CEPHALEXIN 250 MG: 250 CAPSULE ORAL at 05:07

## 2020-06-03 RX ADMIN — DILTIAZEM HYDROCHLORIDE 180 MG: 180 CAPSULE, COATED, EXTENDED RELEASE ORAL at 08:33

## 2020-06-03 RX ADMIN — MULTIPLE VITAMINS W/ MINERALS TAB 1 TABLET: TAB at 08:33

## 2020-06-03 RX ADMIN — QUETIAPINE FUMARATE 12.5 MG: 25 TABLET ORAL at 08:35

## 2020-06-03 RX ADMIN — INSULIN LISPRO 4 UNITS: 100 INJECTION, SOLUTION INTRAVENOUS; SUBCUTANEOUS at 11:42

## 2020-06-03 RX ADMIN — INSULIN LISPRO 2 UNITS: 100 INJECTION, SOLUTION INTRAVENOUS; SUBCUTANEOUS at 08:34

## 2020-06-03 RX ADMIN — ENOXAPARIN SODIUM 30 MG: 30 INJECTION SUBCUTANEOUS at 20:25

## 2020-06-03 RX ADMIN — ACETAMINOPHEN 650 MG: 325 TABLET, FILM COATED ORAL at 08:44

## 2020-06-03 RX ADMIN — OXYBUTYNIN CHLORIDE 5 MG: 5 TABLET, EXTENDED RELEASE ORAL at 20:26

## 2020-06-03 RX ADMIN — AMIODARONE HYDROCHLORIDE 200 MG: 200 TABLET ORAL at 20:26

## 2020-06-03 RX ADMIN — DULOXETINE 30 MG: 30 CAPSULE, DELAYED RELEASE ORAL at 20:25

## 2020-06-03 RX ADMIN — ENOXAPARIN SODIUM 30 MG: 30 INJECTION SUBCUTANEOUS at 08:34

## 2020-06-03 ASSESSMENT — PAIN SCALES - GENERAL: PAINLEVEL_OUTOF10: 7

## 2020-06-03 ASSESSMENT — ENCOUNTER SYMPTOMS
VOMITING: 0
SHORTNESS OF BREATH: 0
NAUSEA: 0
DIARRHEA: 0
ABDOMINAL PAIN: 0
BACK PAIN: 1

## 2020-06-03 NOTE — CARE COORDINATION
DISCHARGE PLANNING NOTE:    Plan is for this patient to go to SNF. LSW is following and we have options from daughter. Waiting to hear which of the 3 facilities will accept this patient. Patient cooperative. Remains 1:1     Orange header - 18%    Will continue to follow along.      Electronically signed by Beatris Tomlin RN on 6/3/2020 at 12:12 PM

## 2020-06-03 NOTE — PROGRESS NOTES
Left 2005    left knee    JOINT REPLACEMENT Right 2016    right knee    JOINT REPLACEMENT Right 2001    right hip    JOINT REPLACEMENT Left 2006    left hip    TOE SURGERY Left 2006    joint removed from 2nd toe       Medications:      losartan  50 mg Oral Daily    oxybutynin  5 mg Oral Nightly    cephALEXin  250 mg Oral 4 times per day    metoprolol succinate  25 mg Oral Daily    QUEtiapine  25 mg Oral Nightly    QUEtiapine  12.5 mg Oral Daily    dilTIAZem  180 mg Oral Daily    docusate sodium  100 mg Oral Daily    amiodarone  200 mg Oral BID    insulin lispro  0-12 Units Subcutaneous TID WC    insulin lispro  0-6 Units Subcutaneous Nightly    sodium chloride flush  10 mL Intravenous 2 times per day    enoxaparin  30 mg Subcutaneous BID    DULoxetine  30 mg Oral Nightly    therapeutic multivitamin-minerals  1 tablet Oral Daily       Social History:     Social History     Socioeconomic History    Marital status:      Spouse name: Not on file    Number of children: Not on file    Years of education: Not on file    Highest education level: Not on file   Occupational History    Not on file   Social Needs    Financial resource strain: Not on file    Food insecurity     Worry: Not on file     Inability: Not on file    Transportation needs     Medical: Not on file     Non-medical: Not on file   Tobacco Use    Smoking status: Never Smoker    Smokeless tobacco: Never Used   Substance and Sexual Activity    Alcohol use: No    Drug use: No    Sexual activity: Not on file   Lifestyle    Physical activity     Days per week: Not on file     Minutes per session: Not on file    Stress: Not on file   Relationships    Social connections     Talks on phone: Not on file     Gets together: Not on file     Attends [employer] service: Not on file     Active member of club or organization: Not on file     Attends meetings of clubs or organizations: Not on file     Relationship status: Not on file LYMPHOPCT 15* 29   MONOPCT 12* 11*     BMP:  Recent Labs     06/02/20  0753 06/03/20  0455    136   K 3.8 3.5*   CL 98 96*   CO2 26 29   BUN 6* 11   CREATININE <0.40* <0.40*   MG  --  1.9       Lab Results   Component Value Date    CREATININE <0.40 06/03/2020    CREATININE 0.7 09/08/2017    GLUCOSE 154 06/03/2020       Detailed results: Thank you for allowing us to participate in the care of this patient. Please call with questions. This note is created with the assistance of a speech recognition program.  While intending to generate adocument that actually reflects the content of the visit, the document can still have some errors including those of syntax and sound a like substitutions which may escape proof reading. It such instances, actual meaningcan be extrapolated by contextual diversion.     Cynthia Manuel MD  Office: [phone]  Perfect serve / office [phone]

## 2020-06-03 NOTE — PROGRESS NOTES
[facility]   IN-PATIENT SERVICE      NEUROLOGY PROGRESS  NOTE                Interval History:     Improved today. Sitting up in bed comfortably. No agitation noted. Tolerating Seroquel well. History of Present Illness: The patient is a 77 y. o. female who presents with altered mental status, described as increasing agitation and confusion.  She stays at home, her daughter is her caregiver. Jovana Loge tramadol, Cymbalta for pain.  Lactic acid initially elevated but has normalized.  The patient was seen and examined and the chart was reviewed.  Patient has baseline dementia although unclear baseline.  On initial exam noted to be AO x2 with dysarthria. Physical Exam:   /69   Pulse 86   Temp 98.1 °F (36.7 °C) (Axillary)   Resp 20   Ht 5' 10\" (1.778 m)   Wt 246 lb 4.1 oz (111.7 kg)   SpO2 100%   BMI 35.33 kg/m²   Temp (24hrs), Av.4 °F (36.9 °C), Min:98.1 °F (36.7 °C), Max:98.8 °F (37.1 °C)      Neurological examination:    Mental status    Alert and oriented x 2-3. Speech is fluent, no dysarthria, aphasia.       Cranial nerves    II - visual fields intact to confrontation; pupils reactive  III, IV, VI - extraocular muscles intact; no TRICE; no nystagmus; no ptosis   V - normal facial sensation                                                               VII - normal facial symmetry                                                             VIII - intact hearing                                                                             IX, X - symmetrical palate elevation                                               XI - symmetrical shoulder shrug                                                       XII - midline tongue without atrophy or fasciculation      Motor function  Strength: 5/5 RUE, 3/5 RLE, 5/5 LUE, 3/5  LLE  Normal bulk and tone.    No tremors                       Sensory function Intact to touch, pin, vibration, throughout      Cerebellar Intact finger-nose-finger

## 2020-06-03 NOTE — PROGRESS NOTES
PRN Meds: potassium chloride **OR** potassium alternative oral replacement **OR** potassium chloride, metoprolol, magnesium sulfate, glucose, dextrose, glucagon (rDNA), dextrose, sodium chloride flush, acetaminophen **OR** acetaminophen, polyethylene glycol, promethazine **OR** ondansetron, haloperidol lactate, famotidine, hydrALAZINE    Data:     Past Medical History:   has a past medical history of Anemia, Chicken pox, Chronic back pain, Dementia ([facility] [address] Diabetes mellitus ([facility] [address] Gastroesophageal reflux disease without esophagitis, Headache, Hypertension, Measles, Mumps, Osteoarthritis, and Whooping cough. Social History:   reports that she has never smoked. She has never used smokeless tobacco. She reports that she does not drink alcohol or use drugs. Family History:   Family History   Problem Relation Age of Onset    High Blood Pressure Mother     Parkinsonism Mother     High Blood Pressure Father     Diabetes Father     Heart Attack Father     Diabetes Sister     High Blood Pressure Sister     Arrhythmia Sister     Diabetes Brother     High Blood Pressure Brother        Vitals:  BP (!) 141/61   Pulse 79   Temp 98.1 °F (36.7 °C) (Axillary)   Resp 20   Ht 5' 10\" (1.778 m)   Wt 246 lb 4.1 oz (111.7 kg)   SpO2 99%   BMI 35.33 kg/m²   Temp (24hrs), Av.4 °F (36.9 °C), Min:98.1 °F (36.7 °C), Max:98.8 °F (37.1 °C)    Recent Labs     20  1058 20  1647 206 20  0730   POCGLU 169* 174* 185* 149*       I/O(24Hr):     Intake/Output Summary (Last 24 hours) at 6/3/2020 0942  Last data filed at 6/3/2020 0515  Gross per 24 hour   Intake --   Output 750 ml   Net -750 ml       Labs:    CBC with Differential:    Lab Results   Component Value Date    WBC 5.7 2020    RBC 4.28 2020    HGB 13.9 2020    HCT 40.5 2020     2020    MCV 94.5 2020    MCH 32.3 2020    MCHC 34.2 2020    RDW 12.1 2020    LYMPHOPCT 29 2020 MONOPCT 11 06/03/2020    BASOPCT 1 06/03/2020    MONOSABS 0.60 06/03/2020    LYMPHSABS 1.70 06/03/2020    EOSABS 0.20 06/03/2020    BASOSABS 0.00 06/03/2020    DIFFTYPE NOT REPORTED 06/03/2020     BMP:    Lab Results   Component Value Date     06/03/2020    K 3.5 06/03/2020    CL 96 06/03/2020    CO2 29 06/03/2020    BUN 11 06/03/2020    LABALBU 4.4 05/27/2020    CREATININE <0.40 06/03/2020    CREATININE 0.7 09/08/2017    CALCIUM 9.5 06/03/2020    GFRAA CANNOT BE CALCULATED 06/03/2020    LABGLOM CANNOT BE CALCULATED 06/03/2020    GLUCOSE 154 06/03/2020       Lab Results   Component Value Date/Time    SPECIAL R AC 6 PURPLE 3 RED 05/29/2020 01:00 PM     Lab Results   Component Value Date/Time    CULTURE NO GROWTH 5 DAYS 05/29/2020 01:00 PM         Radiology:    Xr Chest Portable    Result Date: 5/30/2020  EXAMINATION: ONE XRAY VIEW OF THE CHEST 5/30/2020 3:37 pm COMPARISON: December 21, 2019 HISTORY: ORDERING SYSTEM PROVIDED HISTORY: [facility]ed on RA to 85% TECHNOLOGIST PROVIDED HISTORY: [facility]'ed on RA to 85% Reason for Exam: PT low stats AMS Acuity: Acute Type of Exam: Initial FINDINGS: Elevation of the right hemidiaphragm. Cardiac silhouette is enlarged. No pneumothorax. No pleural effusion. No consolidation. No acute bony abnormality. No focal consolidation. No significant change in appearance of the chest.     Xr Chest Portable    Result Date: 5/28/2020  EXAM:  XR CHEST PORTABLE CLINICAL HISTORY: 70years old Female presenting with agitation. TECHNIQUE:  1 view chest x-ray. COMPARISON: Chest x-ray of 12/21/2019. FINDINGS: No pneumothorax, pleural effusion or focal airspace consolidation. Heart is normal in size. Bony thorax is unremarkable. No acute cardiopulmonary process. Physical Examination:        Physical Exam  HENT:      Head: Normocephalic and atraumatic. Cardiovascular:      Rate and Rhythm: Normal rate and regular rhythm. Pulses: Normal pulses.       Heart sounds: dementia unit,  following.     Sage Real MD  6/3/2020  9:42 AM

## 2020-06-04 ENCOUNTER — TELEPHONE (OUTPATIENT)
Dept: FAMILY MEDICINE CLINIC | Age: 80
End: 2020-06-04

## 2020-06-04 LAB
ABSOLUTE EOS #: 0.3 K/UL (ref 0–0.4)
ABSOLUTE IMMATURE GRANULOCYTE: ABNORMAL K/UL (ref 0–0.3)
ABSOLUTE LYMPH #: 1.9 K/UL (ref 1–4.8)
ABSOLUTE MONO #: 0.7 K/UL (ref 0.1–1.3)
ANION GAP SERPL CALCULATED.3IONS-SCNC: 11 MMOL/L (ref 9–17)
BASOPHILS # BLD: 1 % (ref 0–2)
BASOPHILS ABSOLUTE: 0 K/UL (ref 0–0.2)
BUN BLDV-MCNC: 21 MG/DL (ref 8–23)
BUN/CREAT BLD: ABNORMAL (ref 9–20)
CALCIUM SERPL-MCNC: 9.4 MG/DL (ref 8.6–10.4)
CHLORIDE BLD-SCNC: 100 MMOL/L (ref 98–107)
CO2: 26 MMOL/L (ref 20–31)
CREAT SERPL-MCNC: 0.46 MG/DL (ref 0.5–0.9)
CULTURE: NORMAL
DIFFERENTIAL TYPE: ABNORMAL
EOSINOPHILS RELATIVE PERCENT: 5 % (ref 0–4)
GFR AFRICAN AMERICAN: >60 ML/MIN
GFR NON-AFRICAN AMERICAN: >60 ML/MIN
GFR SERPL CREATININE-BSD FRML MDRD: ABNORMAL ML/MIN/{1.73_M2}
GFR SERPL CREATININE-BSD FRML MDRD: ABNORMAL ML/MIN/{1.73_M2}
GLUCOSE BLD-MCNC: 116 MG/DL (ref 65–105)
GLUCOSE BLD-MCNC: 157 MG/DL (ref 65–105)
GLUCOSE BLD-MCNC: 159 MG/DL (ref 70–99)
GLUCOSE BLD-MCNC: 237 MG/DL (ref 65–105)
GLUCOSE BLD-MCNC: 259 MG/DL (ref 65–105)
HCT VFR BLD CALC: 38 % (ref 36–46)
HEMOGLOBIN: 13.1 G/DL (ref 12–16)
IMMATURE GRANULOCYTES: ABNORMAL %
LYMPHOCYTES # BLD: 34 % (ref 24–44)
Lab: NORMAL
MCH RBC QN AUTO: 32.3 PG (ref 26–34)
MCHC RBC AUTO-ENTMCNC: 34.4 G/DL (ref 31–37)
MCV RBC AUTO: 93.8 FL (ref 80–100)
MONOCYTES # BLD: 12 % (ref 1–7)
NRBC AUTOMATED: ABNORMAL PER 100 WBC
PDW BLD-RTO: 12.3 % (ref 11.5–14.9)
PLATELET # BLD: 259 K/UL (ref 150–450)
PLATELET ESTIMATE: ABNORMAL
PMV BLD AUTO: 8.5 FL (ref 6–12)
POTASSIUM SERPL-SCNC: 4.3 MMOL/L (ref 3.7–5.3)
RBC # BLD: 4.05 M/UL (ref 4–5.2)
RBC # BLD: ABNORMAL 10*6/UL
SEG NEUTROPHILS: 48 % (ref 36–66)
SEGMENTED NEUTROPHILS ABSOLUTE COUNT: 2.7 K/UL (ref 1.3–9.1)
SODIUM BLD-SCNC: 137 MMOL/L (ref 135–144)
SPECIMEN DESCRIPTION: NORMAL
WBC # BLD: 5.7 K/UL (ref 3.5–11)
WBC # BLD: ABNORMAL 10*3/UL

## 2020-06-04 PROCEDURE — 85025 COMPLETE CBC W/AUTO DIFF WBC: CPT

## 2020-06-04 PROCEDURE — 6370000000 HC RX 637 (ALT 250 FOR IP): Performed by: STUDENT IN AN ORGANIZED HEALTH CARE EDUCATION/TRAINING PROGRAM

## 2020-06-04 PROCEDURE — 99239 HOSP IP/OBS DSCHRG MGMT >30: CPT | Performed by: INTERNAL MEDICINE

## 2020-06-04 PROCEDURE — 97530 THERAPEUTIC ACTIVITIES: CPT

## 2020-06-04 PROCEDURE — 97162 PT EVAL MOD COMPLEX 30 MIN: CPT

## 2020-06-04 PROCEDURE — 6370000000 HC RX 637 (ALT 250 FOR IP): Performed by: PSYCHIATRY & NEUROLOGY

## 2020-06-04 PROCEDURE — 6370000000 HC RX 637 (ALT 250 FOR IP): Performed by: INTERNAL MEDICINE

## 2020-06-04 PROCEDURE — 82947 ASSAY GLUCOSE BLOOD QUANT: CPT

## 2020-06-04 PROCEDURE — 2060000000 HC ICU INTERMEDIATE R&B

## 2020-06-04 PROCEDURE — 99231 SBSQ HOSP IP/OBS SF/LOW 25: CPT | Performed by: PSYCHIATRY & NEUROLOGY

## 2020-06-04 PROCEDURE — 99231 SBSQ HOSP IP/OBS SF/LOW 25: CPT | Performed by: INTERNAL MEDICINE

## 2020-06-04 PROCEDURE — 6370000000 HC RX 637 (ALT 250 FOR IP): Performed by: PHYSICIAN ASSISTANT

## 2020-06-04 PROCEDURE — 6360000002 HC RX W HCPCS: Performed by: PHYSICIAN ASSISTANT

## 2020-06-04 PROCEDURE — 36415 COLL VENOUS BLD VENIPUNCTURE: CPT

## 2020-06-04 PROCEDURE — 80048 BASIC METABOLIC PNL TOTAL CA: CPT

## 2020-06-04 RX ORDER — DILTIAZEM HYDROCHLORIDE 180 MG/1
180 CAPSULE, COATED, EXTENDED RELEASE ORAL DAILY
Qty: 30 CAPSULE | Refills: 3 | Status: SHIPPED | OUTPATIENT
Start: 2020-06-05 | End: 2020-07-01 | Stop reason: ALTCHOICE

## 2020-06-04 RX ORDER — QUETIAPINE FUMARATE 25 MG/1
25 TABLET, FILM COATED ORAL NIGHTLY
Qty: 60 TABLET | Refills: 0 | Status: SHIPPED | OUTPATIENT
Start: 2020-06-04 | End: 2021-06-17

## 2020-06-04 RX ORDER — AMIODARONE HYDROCHLORIDE 200 MG/1
200 TABLET ORAL 2 TIMES DAILY
Qty: 30 TABLET | Refills: 3 | Status: SHIPPED | OUTPATIENT
Start: 2020-06-04 | End: 2020-07-01 | Stop reason: ALTCHOICE

## 2020-06-04 RX ORDER — DULOXETIN HYDROCHLORIDE 30 MG/1
30 CAPSULE, DELAYED RELEASE ORAL NIGHTLY
Qty: 30 CAPSULE | Refills: 3 | Status: SHIPPED | OUTPATIENT
Start: 2020-06-04 | End: 2021-06-17

## 2020-06-04 RX ORDER — CEPHALEXIN 250 MG/1
250 CAPSULE ORAL 4 TIMES DAILY
Qty: 12 CAPSULE | Refills: 0 | Status: SHIPPED | OUTPATIENT
Start: 2020-06-04 | End: 2020-06-07

## 2020-06-04 RX ORDER — PSEUDOEPHEDRINE HCL 30 MG
100 TABLET ORAL DAILY
Qty: 30 CAPSULE | Refills: 0 | Status: SHIPPED | OUTPATIENT
Start: 2020-06-05 | End: 2020-07-05

## 2020-06-04 RX ORDER — OXYBUTYNIN CHLORIDE 5 MG/1
5 TABLET, EXTENDED RELEASE ORAL NIGHTLY
Qty: 30 TABLET | Refills: 3 | Status: SHIPPED | OUTPATIENT
Start: 2020-06-04 | End: 2020-06-29 | Stop reason: SDUPTHER

## 2020-06-04 RX ORDER — QUETIAPINE FUMARATE 25 MG/1
12.5 TABLET, FILM COATED ORAL DAILY
Qty: 60 TABLET | Refills: 0 | Status: SHIPPED | OUTPATIENT
Start: 2020-06-05 | End: 2020-06-29 | Stop reason: SDUPTHER

## 2020-06-04 RX ORDER — METOPROLOL SUCCINATE 25 MG/1
25 TABLET, EXTENDED RELEASE ORAL DAILY
Qty: 30 TABLET | Refills: 3 | Status: SHIPPED | OUTPATIENT
Start: 2020-06-05 | End: 2020-07-01 | Stop reason: ALTCHOICE

## 2020-06-04 RX ORDER — LOSARTAN POTASSIUM 50 MG/1
50 TABLET ORAL DAILY
Qty: 30 TABLET | Refills: 3 | Status: SHIPPED | OUTPATIENT
Start: 2020-06-05 | End: 2020-07-01

## 2020-06-04 RX ADMIN — CEPHALEXIN 250 MG: 250 CAPSULE ORAL at 06:18

## 2020-06-04 RX ADMIN — INSULIN LISPRO 2 UNITS: 100 INJECTION, SOLUTION INTRAVENOUS; SUBCUTANEOUS at 08:48

## 2020-06-04 RX ADMIN — METOPROLOL SUCCINATE 25 MG: 25 TABLET, EXTENDED RELEASE ORAL at 08:49

## 2020-06-04 RX ADMIN — OXYBUTYNIN CHLORIDE 5 MG: 5 TABLET, EXTENDED RELEASE ORAL at 21:28

## 2020-06-04 RX ADMIN — QUETIAPINE FUMARATE 25 MG: 50 TABLET ORAL at 21:27

## 2020-06-04 RX ADMIN — ENOXAPARIN SODIUM 30 MG: 30 INJECTION SUBCUTANEOUS at 08:49

## 2020-06-04 RX ADMIN — ENOXAPARIN SODIUM 30 MG: 30 INJECTION SUBCUTANEOUS at 21:27

## 2020-06-04 RX ADMIN — MULTIPLE VITAMINS W/ MINERALS TAB 1 TABLET: TAB at 08:49

## 2020-06-04 RX ADMIN — INSULIN LISPRO 6 UNITS: 100 INJECTION, SOLUTION INTRAVENOUS; SUBCUTANEOUS at 11:54

## 2020-06-04 RX ADMIN — DULOXETINE 30 MG: 30 CAPSULE, DELAYED RELEASE ORAL at 21:27

## 2020-06-04 RX ADMIN — AMIODARONE HYDROCHLORIDE 200 MG: 200 TABLET ORAL at 21:27

## 2020-06-04 RX ADMIN — CEPHALEXIN 250 MG: 250 CAPSULE ORAL at 00:16

## 2020-06-04 RX ADMIN — AMIODARONE HYDROCHLORIDE 200 MG: 200 TABLET ORAL at 08:49

## 2020-06-04 RX ADMIN — DILTIAZEM HYDROCHLORIDE 180 MG: 180 CAPSULE, COATED, EXTENDED RELEASE ORAL at 08:49

## 2020-06-04 RX ADMIN — DOCUSATE SODIUM 100 MG: 100 CAPSULE, LIQUID FILLED ORAL at 08:49

## 2020-06-04 RX ADMIN — LOSARTAN POTASSIUM 50 MG: 50 TABLET ORAL at 08:49

## 2020-06-04 RX ADMIN — QUETIAPINE FUMARATE 12.5 MG: 25 TABLET ORAL at 11:52

## 2020-06-04 RX ADMIN — CEPHALEXIN 250 MG: 250 CAPSULE ORAL at 11:52

## 2020-06-04 RX ADMIN — CEPHALEXIN 250 MG: 250 CAPSULE ORAL at 17:53

## 2020-06-04 RX ADMIN — INSULIN LISPRO 2 UNITS: 100 INJECTION, SOLUTION INTRAVENOUS; SUBCUTANEOUS at 21:28

## 2020-06-04 ASSESSMENT — PAIN SCALES - GENERAL: PAINLEVEL_OUTOF10: 5

## 2020-06-04 ASSESSMENT — ENCOUNTER SYMPTOMS
ABDOMINAL PAIN: 0
VOMITING: 0
DIARRHEA: 0
SHORTNESS OF BREATH: 0
NAUSEA: 0

## 2020-06-04 ASSESSMENT — PAIN DESCRIPTION - LOCATION: LOCATION: NECK

## 2020-06-04 ASSESSMENT — PAIN DESCRIPTION - DESCRIPTORS: DESCRIPTORS: ACHING

## 2020-06-04 NOTE — PROGRESS NOTES
Physical Therapy    Facility/Department: [facility] PROGRESSIVE CARE  Initial Assessment    NAME: Romulo Rees  : 1940  MRN: [id number]    Date of Service: 2020    Discharge Recommendations:  Patient would benefit from continued therapy after discharge   PT Equipment Recommendations  Equipment Needed: No    Assessment   Body structures, Functions, Activity limitations: Decreased functional mobility ; Decreased strength;Decreased endurance  Assessment: Impaired mobility due to safety and decreased tolerance to activity  Decision Making: Medium Complexity  History: Dementia  Exam: decreased mobility, strength, endurance  Clinical Presentation: evolving  Activity Tolerance  Activity Tolerance: Patient limited by endurance       Patient Diagnosis(es): There were no encounter diagnoses. has a past medical history of Anemia, Chicken pox, Chronic back pain, Dementia ([facility] [address] Diabetes mellitus ([facility] [address] Gastroesophageal reflux disease without esophagitis, Headache, Hypertension, Measles, Mumps, Osteoarthritis, and Whooping cough. has a past surgical history that includes Carpal tunnel release (Left, ); joint replacement (Right, ); joint replacement (Left, ); joint replacement (Right, ); joint replacement (Right, ); joint replacement (Left, ); and Toe Surgery (Left, ).     Restrictions  Restrictions/Precautions  Restrictions/Precautions: Fall Risk        Subjective  General  Family / Caregiver Present: Yes(daughter)  Follows Commands: Within Functional Limits  Subjective  Subjective: pleasant and cooperative  Pain Screening  Patient Currently in Pain: Yes  Pain Assessment  Pain Assessment: 0-10  Pain Level: 5  Pain Location: Neck  Pain Descriptors: Aching  Vital Signs  Patient Currently in Pain: Yes       Orientation  Orientation  Overall Orientation Status: Within Functional Limits  Social/Functional History  Social/Functional History  Lives With: Daughter  Type of Home: House  Home Layout: One level  Home Access: Stairs to enter without rails  Entrance Stairs - Number of Steps: 1  Bathroom Shower/Tub: Tub/Shower unit(usually washes up at sink)  Bathroom Toilet: Standard  Home Equipment: Lift chair, 4 wheeled walker(bar on bed, but mostly sleeps in lift chair)  Ambulation Assistance: Independent(with Rollator)  Transfer Assistance: Independent       Objective          AROM RLE (degrees)  RLE AROM: WFL  AROM LLE (degrees)  LLE AROM : WFL  Strength RLE  Comment: grossly 3/3-/5  Strength LLE  Comment: grossly 3/3-/5        Bed mobility  Supine to Sit: Minimal assistance  Sit to Supine: Moderate assistance  Scooting: Minimal assistance  Transfers  Sit to Stand: Maximum Assistance  Stand to sit: Minimal Assistance  Comment: pt stood 3 times at bedside with rw for 20-45sec.  pt stands with marked forward trunk flexion  Ambulation  Ambulation?: No(pt attempted a step with left leg in which R leg startted to buckle and pt sat on bed)  WB Status: WBAT     Balance  Sitting - Static: +;Fair(SBA)  Sitting - Dynamic: Fair;+(SBA)        Plan   Plan  Times per week: 7x/wk  Current Treatment Recommendations: Strengthening, Balance Training, Functional Mobility Training, Endurance Training, Transfer Training, Gait Training  Safety Devices  Type of devices: Left in bed, Patient at risk for falls, Call light within reach(daughter present)  Restraints  Initially in place: No      Goals  Short term goals  Time Frame for Short term goals: 2-3 days  Short term goal 1: Lora with bed mobility  Short term goal 2: modA with transfers to stand  Short term goal 3: modA with RW from bed to chair       Therapy Time   Individual Concurrent Group Co-treatment   Time In 1118         Time Out 1147         Minutes [address] [facility],

## 2020-06-04 NOTE — PROGRESS NOTES
flush, acetaminophen **OR** acetaminophen, polyethylene glycol, promethazine **OR** ondansetron, haloperidol lactate, famotidine, hydrALAZINE    Data:     Past Medical History:   has a past medical history of Anemia, Chicken pox, Chronic back pain, Dementia ([facility] [address] [facility] Gastroesophageal reflux disease without esophagitis, Headache, Hypertension, Measles, Mumps, Osteoarthritis, and Whooping cough. Social History:   reports that she has never smoked. She has never used smokeless tobacco. She reports that she does not drink alcohol or use drugs. Family History:   Family History   Problem Relation Age of Onset    High Blood Pressure Mother     Parkinsonism Mother     High Blood Pressure Father     Diabetes Father     Heart Attack Father     Diabetes Sister     High Blood Pressure Sister     Arrhythmia Sister     Diabetes Brother     High Blood Pressure Brother        Vitals:  BP (!) 142/63   Pulse 78   Temp 98 °F (36.7 °C) (Axillary)   Resp 20   Ht 5' 10\" (1.778 m)   Wt 246 lb 4.1 oz (111.7 kg)   SpO2 100%   BMI 35.33 kg/m²   Temp (24hrs), Av °F (36.7 °C), Min:98 °F (36.7 °C), Max:98.1 °F (36.7 °C)    Recent Labs     20  1051 20  1640 20  1912 20  0716   POCGLU 221* 121* 179* 157*       I/O(24Hr):     Intake/Output Summary (Last 24 hours) at 2020 0937  Last data filed at 6/3/2020 1804  Gross per 24 hour   Intake --   Output 550 ml   Net -550 ml       Labs:    CBC with Differential:    Lab Results   Component Value Date    WBC 5.7 2020    RBC 4.05 2020    HGB 13.1 2020    HCT 38.0 2020     2020    MCV 93.8 2020    MCH 32.3 2020    MCHC 34.4 2020    RDW 12.3 2020    LYMPHOPCT 34 2020    MONOPCT 12 2020    BASOPCT 1 2020    MONOSABS 0.70 2020    LYMPHSABS 1.90 2020    EOSABS 0.30 2020    BASOSABS 0.00 2020    DIFFTYPE NOT REPORTED 2020

## 2020-06-04 NOTE — PROGRESS NOTES
1:1 discontinued at 1330 6/3/2020 as patient is alert and oriented x3, patient unsure of time. Patient polite, following commands and is not agitated. Patient responds well to verbal redirection and is able to make most needs known. Electronically signed by Carri Mullen RN on 6/3/2020.

## 2020-06-05 VITALS
HEART RATE: 87 BPM | RESPIRATION RATE: 19 BRPM | OXYGEN SATURATION: 97 % | SYSTOLIC BLOOD PRESSURE: 141 MMHG | HEIGHT: 70 IN | WEIGHT: 246.25 LBS | BODY MASS INDEX: 35.25 KG/M2 | DIASTOLIC BLOOD PRESSURE: 59 MMHG | TEMPERATURE: 98 F

## 2020-06-05 LAB
ABSOLUTE EOS #: 0.2 K/UL (ref 0–0.4)
ABSOLUTE IMMATURE GRANULOCYTE: ABNORMAL K/UL (ref 0–0.3)
ABSOLUTE LYMPH #: 2.2 K/UL (ref 1–4.8)
ABSOLUTE MONO #: 0.7 K/UL (ref 0.1–1.3)
ANION GAP SERPL CALCULATED.3IONS-SCNC: 12 MMOL/L (ref 9–17)
BASOPHILS # BLD: 1 % (ref 0–2)
BASOPHILS ABSOLUTE: 0 K/UL (ref 0–0.2)
BUN BLDV-MCNC: 15 MG/DL (ref 8–23)
BUN/CREAT BLD: ABNORMAL (ref 9–20)
CALCIUM SERPL-MCNC: 11.4 MG/DL (ref 8.6–10.4)
CHLORIDE BLD-SCNC: 97 MMOL/L (ref 98–107)
CO2: 29 MMOL/L (ref 20–31)
CREAT SERPL-MCNC: 0.5 MG/DL (ref 0.5–0.9)
DIFFERENTIAL TYPE: ABNORMAL
EOSINOPHILS RELATIVE PERCENT: 4 % (ref 0–4)
GFR AFRICAN AMERICAN: >60 ML/MIN
GFR NON-AFRICAN AMERICAN: >60 ML/MIN
GFR SERPL CREATININE-BSD FRML MDRD: ABNORMAL ML/MIN/{1.73_M2}
GFR SERPL CREATININE-BSD FRML MDRD: ABNORMAL ML/MIN/{1.73_M2}
GLUCOSE BLD-MCNC: 178 MG/DL (ref 65–105)
GLUCOSE BLD-MCNC: 187 MG/DL (ref 70–99)
HCT VFR BLD CALC: 41.1 % (ref 36–46)
HEMOGLOBIN: 14.2 G/DL (ref 12–16)
IMMATURE GRANULOCYTES: ABNORMAL %
LYMPHOCYTES # BLD: 39 % (ref 24–44)
MCH RBC QN AUTO: 32.7 PG (ref 26–34)
MCHC RBC AUTO-ENTMCNC: 34.6 G/DL (ref 31–37)
MCV RBC AUTO: 94.4 FL (ref 80–100)
MONOCYTES # BLD: 12 % (ref 1–7)
NRBC AUTOMATED: ABNORMAL PER 100 WBC
PDW BLD-RTO: 12.3 % (ref 11.5–14.9)
PLATELET # BLD: 312 K/UL (ref 150–450)
PLATELET ESTIMATE: ABNORMAL
PMV BLD AUTO: 8 FL (ref 6–12)
POTASSIUM SERPL-SCNC: 4.1 MMOL/L (ref 3.7–5.3)
RBC # BLD: 4.36 M/UL (ref 4–5.2)
RBC # BLD: ABNORMAL 10*6/UL
SEG NEUTROPHILS: 44 % (ref 36–66)
SEGMENTED NEUTROPHILS ABSOLUTE COUNT: 2.4 K/UL (ref 1.3–9.1)
SODIUM BLD-SCNC: 138 MMOL/L (ref 135–144)
WBC # BLD: 5.6 K/UL (ref 3.5–11)
WBC # BLD: ABNORMAL 10*3/UL

## 2020-06-05 PROCEDURE — 6370000000 HC RX 637 (ALT 250 FOR IP): Performed by: INTERNAL MEDICINE

## 2020-06-05 PROCEDURE — 6370000000 HC RX 637 (ALT 250 FOR IP): Performed by: STUDENT IN AN ORGANIZED HEALTH CARE EDUCATION/TRAINING PROGRAM

## 2020-06-05 PROCEDURE — 36415 COLL VENOUS BLD VENIPUNCTURE: CPT

## 2020-06-05 PROCEDURE — 80048 BASIC METABOLIC PNL TOTAL CA: CPT

## 2020-06-05 PROCEDURE — 6360000002 HC RX W HCPCS: Performed by: PHYSICIAN ASSISTANT

## 2020-06-05 PROCEDURE — 6370000000 HC RX 637 (ALT 250 FOR IP): Performed by: PHYSICIAN ASSISTANT

## 2020-06-05 PROCEDURE — 82947 ASSAY GLUCOSE BLOOD QUANT: CPT

## 2020-06-05 PROCEDURE — 6370000000 HC RX 637 (ALT 250 FOR IP): Performed by: PSYCHIATRY & NEUROLOGY

## 2020-06-05 PROCEDURE — 85025 COMPLETE CBC W/AUTO DIFF WBC: CPT

## 2020-06-05 PROCEDURE — 99239 HOSP IP/OBS DSCHRG MGMT >30: CPT | Performed by: INTERNAL MEDICINE

## 2020-06-05 RX ADMIN — ENOXAPARIN SODIUM 30 MG: 30 INJECTION SUBCUTANEOUS at 08:12

## 2020-06-05 RX ADMIN — DOCUSATE SODIUM 100 MG: 100 CAPSULE, LIQUID FILLED ORAL at 08:11

## 2020-06-05 RX ADMIN — QUETIAPINE FUMARATE 12.5 MG: 25 TABLET ORAL at 08:14

## 2020-06-05 RX ADMIN — METOPROLOL SUCCINATE 25 MG: 25 TABLET, EXTENDED RELEASE ORAL at 08:11

## 2020-06-05 RX ADMIN — CEPHALEXIN 250 MG: 250 CAPSULE ORAL at 00:43

## 2020-06-05 RX ADMIN — INSULIN LISPRO 2 UNITS: 100 INJECTION, SOLUTION INTRAVENOUS; SUBCUTANEOUS at 08:11

## 2020-06-05 RX ADMIN — MULTIPLE VITAMINS W/ MINERALS TAB 1 TABLET: TAB at 08:11

## 2020-06-05 RX ADMIN — AMIODARONE HYDROCHLORIDE 200 MG: 200 TABLET ORAL at 08:11

## 2020-06-05 RX ADMIN — CEPHALEXIN 250 MG: 250 CAPSULE ORAL at 06:32

## 2020-06-05 RX ADMIN — DILTIAZEM HYDROCHLORIDE 180 MG: 180 CAPSULE, COATED, EXTENDED RELEASE ORAL at 08:11

## 2020-06-05 ASSESSMENT — ENCOUNTER SYMPTOMS
VOMITING: 0
DIARRHEA: 0
SHORTNESS OF BREATH: 0
BACK PAIN: 0
APNEA: 0
ABDOMINAL PAIN: 0
NAUSEA: 0
ABDOMINAL DISTENTION: 0
CHEST TIGHTNESS: 0
CONSTIPATION: 0

## 2020-06-05 NOTE — PLAN OF CARE
Problem: Confusion - Acute:  Goal: Absence of continued neurological deterioration signs and symptoms  Description: Absence of continued neurological deterioration signs and symptoms  6/5/2020 0413 by Liset Thacker  Outcome: Met This Shift  Note: Pt is stable  6/4/2020 1837 by Ron Martinez RN  Outcome: Ongoing  Goal: Mental status will be restored to baseline  Description: Mental status will be restored to baseline  6/5/2020 0413 by Liset Thacker  Outcome: Met This Shift  6/4/2020 1837 by Ron Martinez RN  Outcome: Ongoing     Problem: Injury - Risk of, Physical Injury:  Goal: Absence of physical injury  Description: Absence of physical injury  6/5/2020 0413 by Liset Thacker  Outcome: Met This Shift  Note: Patient remained free from injury. Call light within reach. 6/4/2020 1837 by Ron Martinez RN  Outcome: Ongoing  Goal: Will remain free from falls  Description: Will remain free from falls  6/5/2020 0413 by Liset Thacker  Outcome: Met This Shift  Note: Patient remained free from falls. Call light within reach. 6/4/2020 1837 by Ron Martinez RN  Outcome: Ongoing     Problem: Falls - Risk of:  Goal: Absence of physical injury  Description: Absence of physical injury  6/5/2020 0413 by Liset Thacker  Outcome: Met This Shift  Note: Patient remained free from injury. Call light within reach. 6/4/2020 1837 by Ron Martinez RN  Outcome: Ongoing  Goal: Will remain free from falls  Description: Will remain free from falls  6/5/2020 0413 by Liset Thacker  Outcome: Met This Shift  Note: Patient remained free from falls. Call light within reach. 6/4/2020 1837 by Ron Martinez RN  Outcome: Ongoing     Problem: Skin Integrity:  Goal: Will show no infection signs and symptoms  Description: Will show no infection signs and symptoms  6/5/2020 0413 by Liset Thacker  Outcome: Met This Shift  Note: No new alterations in skin integrity.    6/4/2020 1837 by Ron Martinez RN  Outcome: Ongoing  Goal: Absence of new skin breakdown  Description: Absence of new skin breakdown  6/5/2020 0413 by Philadelphia Betters  Outcome: Met This Shift  6/4/2020 1837 by Jennifer Martin RN  Outcome: Ongoing     Problem: Cardiac:  Goal: Ability to maintain an adequate cardiac output will improve  Description: Ability to maintain an adequate cardiac output will improve  6/5/2020 0413 by Ada Betters  Outcome: Met This Shift  6/4/2020 1837 by Jennifer Martin RN  Outcome: Ongoing     Problem: Discharge Planning:  Goal: Ability to perform activities of daily living will improve  Description: Ability to perform activities of daily living will improve  6/5/2020 0413 by [place] Betters  Outcome: Ongoing  6/4/2020 1837 by Jennifer Martin RN  Outcome: Ongoing  Goal: Participates in care planning  Description: Participates in care planning  6/5/2020 0413 by Martha Betters  Outcome: Ongoing  6/4/2020 1837 by Jennifer Martin RN  Outcome: Ongoing     Problem: Mood - Altered:  Goal: Mood stable  Description: Mood stable  6/5/2020 0413 by Martha Betters  Outcome: Ongoing  6/4/2020 1837 by Jennifer Martin RN  Outcome: Ongoing  Goal: Absence of abusive behavior  Description: Absence of abusive behavior  6/5/2020 0413 by [place] Betters  Outcome: Ongoing  6/4/2020 1837 by Jennifer Martin RN  Outcome: Ongoing  Goal: Verbalizations of feeling emotionally comfortable while being cared for will increase  Description: Verbalizations of feeling emotionally comfortable while being cared for will increase  6/5/2020 0413 by Martha Aracelis  Outcome: Ongoing  6/4/2020 1837 by Jennifer Martin RN  Outcome: Ongoing     Problem: Psychomotor Activity - Altered:  Goal: Absence of psychomotor disturbance signs and symptoms  Description: Absence of psychomotor disturbance signs and symptoms  6/5/2020 0413 by Ada Betters  Outcome: Ongoing  6/4/2020 1837 by Jennifer Martin RN  Outcome: Ongoing     Problem: Sensory Perception - Impaired:  Goal: Demonstrations of improved sensory functioning will increase  Description: Demonstrations of improved sensory functioning will increase  6/5/2020 0413 by Ester Zendejas  Outcome: Ongoing  6/4/2020 1837 by Kay Mtz RN  Outcome: Ongoing  Goal: Decrease in sensory misperception frequency  Description: Decrease in sensory misperception frequency  6/5/2020 0413 by Ester Zendejas  Outcome: Ongoing  6/4/2020 1837 by Kay Mtz RN  Outcome: Ongoing  Goal: Able to refrain from responding to false sensory perceptions  Description: Able to refrain from responding to false sensory perceptions  6/5/2020 0413 by Ester Zendejas  Outcome: Ongoing  6/4/2020 1837 by Kay Mtz RN  Outcome: Ongoing  Goal: Demonstrates accurate environmental perceptions  Description: Demonstrates accurate environmental perceptions  6/5/2020 0413 by Ester Zendejas  Outcome: Ongoing  6/4/2020 1837 by Kay Mtz RN  Outcome: Ongoing  Goal: Able to distinguish between reality-based and nonreality-based thinking  Description: Able to distinguish between reality-based and nonreality-based thinking  6/5/2020 0413 by Ester Zendejas  Outcome: Ongoing  6/4/2020 1837 by Kay Mtz RN  Outcome: Ongoing  Goal: Able to interrupt nonreality-based thinking  Description: Able to interrupt nonreality-based thinking  6/5/2020 0413 by Ester Zendejas  Outcome: Ongoing  6/4/2020 1837 by Kay Mtz RN  Outcome: Ongoing     Problem: Sleep Pattern Disturbance:  Goal: Appears well-rested  Description: Appears well-rested  6/5/2020 0413 by Ester Zendejas  Outcome: Ongoing  6/4/2020 1837 by Kay Mtz RN  Outcome: Ongoing     Problem: Pain:  Goal: Pain level will decrease  Description: Pain level will decrease  6/5/2020 0413 by Ester Zendejas  Outcome: Ongoing  6/4/2020 1837 by Kay Mtz RN  Outcome: Ongoing  Goal: Control of acute pain  Description: Control of acute pain  6/5/2020 0413 by Ester Zendejas  Outcome: Ongoing  6/4/2020 1837 by Kay Mtz RN  Outcome: Ongoing  Goal: Control of chronic pain  Description: Control of chronic pain  6/5/2020 0413 by Ester Zendejas  Outcome:

## 2020-06-05 NOTE — PROGRESS NOTES
RN called report to [employer], RN at [facility]. Updated on patient background and pickup time. No further questions at this time.

## 2020-06-05 NOTE — DISCHARGE SUMMARY
[address]    Discharge Summary     Patient ID: Olesya Bear  :  [id number]   MRN: [id number]     ACCOUNT:  [de-identified]   Patient's PCP: Connie Recio MD  Admit Date: 2020   Discharge Date: 2020     Length of Stay: 8  Code Status:  Prior  Admitting Physician: Richard Ackerman MD  Discharge Physician: Dena Grimm MD     Active Discharge Diagnoses:       Primary Problem  Delirium      [place] Problems    Diagnosis Date Noted    Encephalopathy, unspecified [G93.40]     Delirium [R41.0] 2020    [facility] [I48.91] 2018    Essential hypertension [I10] 2017    Gastroesophageal reflux disease without esophagitis [K21.9] 2017    Type 2 diabetes mellitus without complication ([facility] [S36.7] 2017       Admission Condition:  fair     Discharged Condition: good    Hospital Stay:       Hospital Course:      Patient is a 80-year-old lady with past medical history of dementia who was transferred to [facility] from [facility], patient was in acute delirium and unable to provide any history. As per nursing staff, patient was very agitated and uncooperative. No vomiting, diarrhea, was afebrile with no rashes. The course of her stay, patient was given Risperdal as per neurology consult, patient's acute delirium improved as cardiology was following and patient stayed in normal sinus rhythm with calcium channel blockers, due to frequent fall risk. Anticoagulation. Cardizem was increased to 280 mg. It was found that the patient was most likely delirious due to underlying UTI, antibiotics were eventually discontinued as urine and blood culture showed no growth. Patient was consistently on IV fluids. Once patient's delirium improved, patient was discharged on  to nursing home.   On day of discharge, all consults involved in patient's care were agreeable to discharge and patient was in stable condition. Patient had Toxic Metabolic encephalopathy on Presentation, due to underlying UTI   Significant Diagnostic Studies:     Radiology:  Xr Chest Portable    Result Date: 5/30/2020  EXAMINATION: ONE XRAY VIEW OF THE CHEST 5/30/2020 3:37 pm COMPARISON: December 21, 2019 HISTORY: ORDERING SYSTEM PROVIDED HISTORY: desat'ed on RA to 85% TECHNOLOGIST PROVIDED HISTORY: desat'ed on RA to 85% Reason for Exam: PT low stats AMS Acuity: Acute Type of Exam: Initial FINDINGS: Elevation of the right hemidiaphragm. Cardiac silhouette is enlarged. No pneumothorax. No pleural effusion. No consolidation. No acute bony abnormality. No focal consolidation. No significant change in appearance of the chest.     Xr Chest Portable    Result Date: 5/28/2020  EXAM:  XR CHEST PORTABLE CLINICAL HISTORY: 70years old Female presenting with agitation. TECHNIQUE:  1 view chest x-ray. COMPARISON: Chest x-ray of 12/21/2019. FINDINGS: No pneumothorax, pleural effusion or focal airspace consolidation. Heart is normal in size. Bony thorax is unremarkable. No acute cardiopulmonary process. Consultations:    Consults:     Final Specialist Recommendations/Findings:   IP CONSULT TO NEUROLOGY  IP CONSULT TO CARDIOLOGY  PHARMACY TO DOSE VANCOMYCIN  IP CONSULT TO INFECTIOUS DISEASES      The patient was seen and examined on day of discharge and this discharge summary is in conjunction with any daily progress note from day of discharge.     Discharge plan:       Disposition: Non-mercy facility     Physician Follow Up:     Joan Gomez MD  [place] [phone]    In 4 weeks  Hospital follow up        Requiring Further Evaluation/Follow Up POST HOSPITALIZATION/Incidental Findings:     Diet: regular diet    Activity: As tolerated    Instructions to Patient: Does not apply route daily     blood glucose test strips  Test Blood sugar Twice a day and as needed  Please disense to go with meter     diclofenac sodium 1 % Gel  Commonly known as:  VOLTAREN  Apply 2 g topically 2 times daily     famotidine 20 MG tablet  Commonly known as:  PEPCID  Take 1 tablet by mouth nightly as needed (indigestion, stomach burning)     therapeutic multivitamin-minerals tablet         * This list has 2 medication(s) that are the same as other medications prescribed for you. Read the directions carefully, and ask your doctor or other care provider to review them with you. STOP taking these medications    atenolol 50 MG tablet  Commonly known as:  TENORMIN     glimepiride 1 MG tablet  Commonly known as:  AMARYL     GLUCOSAMINE 1500 COMPLEX PO     Lancets Misc     metFORMIN 500 MG tablet  Commonly known as:  GLUCOPHAGE     omeprazole 20 MG delayed release capsule  Commonly known as:  PRILOSEC     traMADol 50 MG tablet  Commonly known as:  ULTRAM     vitamin B-12 1000 MCG tablet  Commonly known as:  CYANOCOBALAMIN     vitamin D 1000 UNIT Tabs tablet  Commonly known as:  CHOLECALCIFEROL           Where to Get Your Medications      These medications were sent to [address] [place] [address], [address] [place] [address]. [address] [place], [place] [id number] - F [phone]  [address]  [facility] [address]    Phone:  [phone]   · amiodarone 200 MG tablet  · cephALEXin 250 MG capsule  · dilTIAZem 180 MG extended release capsule  · docusate 100 MG Caps  · DULoxetine 30 MG extended release capsule  · losartan 50 MG tablet  · metoprolol succinate 25 MG extended release tablet  · oxybutynin 5 MG extended release tablet  · QUEtiapine 25 MG tablet  · QUEtiapine 25 MG tablet         Time Spent on discharge is  20 mins in patient examination, evaluation, counseling as well as medication reconciliation, prescriptions for required medications, discharge plan and follow

## 2020-06-26 ENCOUNTER — APPOINTMENT (OUTPATIENT)
Dept: GENERAL RADIOLOGY | Age: 80
End: 2020-06-26
Payer: MEDICARE

## 2020-06-26 ENCOUNTER — CARE COORDINATION (OUTPATIENT)
Dept: CASE MANAGEMENT | Age: 80
End: 2020-06-26

## 2020-06-26 ENCOUNTER — HOSPITAL ENCOUNTER (EMERGENCY)
Age: 80
Discharge: ANOTHER HOSPITAL | End: 2020-06-27
Attending: EMERGENCY MEDICINE
Payer: MEDICARE

## 2020-06-26 LAB
-: ABNORMAL
ABSOLUTE EOS #: 0.3 K/UL (ref 0–0.4)
ABSOLUTE IMMATURE GRANULOCYTE: NORMAL K/UL (ref 0–0.3)
ABSOLUTE LYMPH #: 3.3 K/UL (ref 1–4.8)
ABSOLUTE MONO #: 0.8 K/UL (ref 0–1)
ALBUMIN SERPL-MCNC: 3.8 G/DL (ref 3.5–5.2)
ALBUMIN/GLOBULIN RATIO: ABNORMAL (ref 1–2.5)
ALP BLD-CCNC: 87 U/L (ref 35–104)
ALT SERPL-CCNC: 13 U/L (ref 5–33)
AMORPHOUS: ABNORMAL
ANION GAP SERPL CALCULATED.3IONS-SCNC: 8 MMOL/L (ref 9–17)
AST SERPL-CCNC: 17 U/L
BACTERIA: ABNORMAL
BASOPHILS # BLD: 1 % (ref 0–2)
BASOPHILS ABSOLUTE: 0.1 K/UL (ref 0–0.2)
BILIRUB SERPL-MCNC: 0.64 MG/DL (ref 0.3–1.2)
BILIRUBIN URINE: NEGATIVE
BNP INTERPRETATION: ABNORMAL
BUN BLDV-MCNC: 21 MG/DL (ref 8–23)
BUN/CREAT BLD: 23 (ref 9–20)
CALCIUM SERPL-MCNC: 10.6 MG/DL (ref 8.6–10.4)
CASTS UA: ABNORMAL /LPF
CHLORIDE BLD-SCNC: 96 MMOL/L (ref 98–107)
CO2: 26 MMOL/L (ref 20–31)
COLOR: YELLOW
COMMENT UA: ABNORMAL
CREAT SERPL-MCNC: 0.93 MG/DL (ref 0.5–0.9)
CRYSTALS, UA: ABNORMAL /HPF
DIFFERENTIAL TYPE: YES
EOSINOPHILS RELATIVE PERCENT: 2 % (ref 0–5)
EPITHELIAL CELLS UA: ABNORMAL /HPF
GFR AFRICAN AMERICAN: >60 ML/MIN
GFR NON-AFRICAN AMERICAN: 58 ML/MIN
GFR SERPL CREATININE-BSD FRML MDRD: ABNORMAL ML/MIN/{1.73_M2}
GFR SERPL CREATININE-BSD FRML MDRD: ABNORMAL ML/MIN/{1.73_M2}
GLUCOSE BLD-MCNC: 134 MG/DL (ref 70–99)
GLUCOSE URINE: NEGATIVE
HCT VFR BLD CALC: 37.6 % (ref 36–46)
HEMOGLOBIN: 13.2 G/DL (ref 12–16)
IMMATURE GRANULOCYTES: NORMAL %
INR BLD: 1
KETONES, URINE: ABNORMAL
LEUKOCYTE ESTERASE, URINE: NEGATIVE
LYMPHOCYTES # BLD: 31 % (ref 15–40)
MCH RBC QN AUTO: 32.6 PG (ref 26–34)
MCHC RBC AUTO-ENTMCNC: 35.1 G/DL (ref 31–37)
MCV RBC AUTO: 93 FL (ref 80–100)
MONOCYTES # BLD: 7 % (ref 4–8)
MUCUS: ABNORMAL
NITRITE, URINE: NEGATIVE
NRBC AUTOMATED: NORMAL PER 100 WBC
OTHER OBSERVATIONS UA: ABNORMAL
PARTIAL THROMBOPLASTIN TIME: 27.9 SEC (ref 23.9–33.8)
PDW BLD-RTO: 12.8 % (ref 12.1–15.2)
PH UA: 6 (ref 5–8)
PLATELET # BLD: 247 K/UL (ref 140–450)
PLATELET ESTIMATE: NORMAL
PMV BLD AUTO: NORMAL FL (ref 6–12)
POTASSIUM SERPL-SCNC: 4.9 MMOL/L (ref 3.7–5.3)
PRO-BNP: 987 PG/ML
PROTEIN UA: ABNORMAL
PROTHROMBIN TIME: 12.2 SEC (ref 11.5–14.2)
RBC # BLD: 4.04 M/UL (ref 4–5.2)
RBC # BLD: NORMAL 10*6/UL
RBC UA: ABNORMAL /HPF (ref 0–2)
RENAL EPITHELIAL, UA: ABNORMAL /HPF
SEG NEUTROPHILS: 59 % (ref 47–75)
SEGMENTED NEUTROPHILS ABSOLUTE COUNT: 6.3 K/UL (ref 2.5–7)
SODIUM BLD-SCNC: 130 MMOL/L (ref 135–144)
SPECIFIC GRAVITY UA: 1.01 (ref 1–1.03)
TOTAL PROTEIN: 6.9 G/DL (ref 6.4–8.3)
TRICHOMONAS: ABNORMAL
TROPONIN INTERP: NORMAL
TROPONIN T: <0.03 NG/ML
TROPONIN, HIGH SENSITIVITY: NORMAL NG/L (ref 0–14)
TURBIDITY: CLEAR
URINE HGB: ABNORMAL
UROBILINOGEN, URINE: NORMAL
WBC # BLD: 10.7 K/UL (ref 3.5–11)
WBC # BLD: NORMAL 10*3/UL
WBC UA: ABNORMAL /HPF
YEAST: ABNORMAL

## 2020-06-26 PROCEDURE — 2580000003 HC RX 258: Performed by: EMERGENCY MEDICINE

## 2020-06-26 PROCEDURE — 85610 PROTHROMBIN TIME: CPT

## 2020-06-26 PROCEDURE — 99291 CRITICAL CARE FIRST HOUR: CPT

## 2020-06-26 PROCEDURE — 85730 THROMBOPLASTIN TIME PARTIAL: CPT

## 2020-06-26 PROCEDURE — 85025 COMPLETE CBC W/AUTO DIFF WBC: CPT

## 2020-06-26 PROCEDURE — 71045 X-RAY EXAM CHEST 1 VIEW: CPT

## 2020-06-26 PROCEDURE — 1111F DSCHRG MED/CURRENT MED MERGE: CPT | Performed by: INTERNAL MEDICINE

## 2020-06-26 PROCEDURE — 81001 URINALYSIS AUTO W/SCOPE: CPT

## 2020-06-26 PROCEDURE — 84484 ASSAY OF TROPONIN QUANT: CPT

## 2020-06-26 PROCEDURE — 83880 ASSAY OF NATRIURETIC PEPTIDE: CPT

## 2020-06-26 PROCEDURE — 93005 ELECTROCARDIOGRAM TRACING: CPT | Performed by: EMERGENCY MEDICINE

## 2020-06-26 PROCEDURE — 80053 COMPREHEN METABOLIC PANEL: CPT

## 2020-06-26 RX ORDER — ATENOLOL 50 MG/1
50 TABLET ORAL 2 TIMES DAILY
COMMUNITY
End: 2020-07-01 | Stop reason: ALTCHOICE

## 2020-06-26 RX ORDER — 0.9 % SODIUM CHLORIDE 0.9 %
1000 INTRAVENOUS SOLUTION INTRAVENOUS ONCE
Status: COMPLETED | OUTPATIENT
Start: 2020-06-26 | End: 2020-06-26

## 2020-06-26 RX ADMIN — SODIUM CHLORIDE 1000 ML: 9 INJECTION, SOLUTION INTRAVENOUS at 20:26

## 2020-06-26 NOTE — CARE COORDINATION
[place] [address] Transitions Initial Follow Up Call    Call within 2 business days of discharge: Yes    Patient: Emilia Ac Patient : 1940   MRN: [id number]  Reason for Admission: Deliriu  Discharge Date: 20 RARS: Readmission Risk Score: 20      Last Discharge [facility]       Complaint Diagnosis Description Type Department Provider    20   Admission (Discharged) Donya Montero MD        Spoke to Missie Angelucci after 2 pt identifiers. Pt is doing well and very happy to be home Pt walks with a walker with CGA since she is unsteady. Was on a rollator but now is unable to operate breaks properly. Has HH with [facility] and nurse was there earlier and PTwill be there next Tuesday. Missie Angelucci denies allergy to PCA and pt can tolerate 325 ASA. Pt is diabetic and is taking Metformin 500 mg 1 TAB BID and Glimepiride 1 mg 1 tab BID. BS was 112 today. Manages with low carb diet too. Unsure of Metoprolol and Atenolol. Was on Metoprolol in [place] and is on home medlist but no prescription. Has the Atenolol 50 mg at home. Call into Dr. Antione Mcgovern to clarify and get a prescription if Metoprolol wanted. Also pt taking Omeprazole 40 mg BID. Suggested keeping track of food eaten when mom gets a really upset stomach and see if any correlation. Also to track stools to prevent constipation issues. Also calling Dr. Antione Mcgovern to make an f/u appt. Pt with significant sundowners and takes Seroquel. ASA is taken as a blood thinner. Previous thinner was d/c d/t [facility]. Offered dietician for DMII control advice and declined. Per [facility] nurse calling MD to clarify metoprolol and atenolol. Routed note to Dr. Callum Torres.       Yina Strauss, [phone]   Spoke with: [address]: [facility]    Non-face-to-face services provided:  Education of patient/family/caregiver/guardian to support self-management-medicine and diabetic control    Care Transitions 24 Hour Call    Do you have a copy of your discharge instructions?:  Yes  Do you have all of your prescriptions and are they filled?:  No  Have you been contacted by a [address]?:  No  Have you scheduled your follow up appointment?:  Yes  How are you going to get to your appointment?:  Car - family or friend to transport  Were you discharged with any Home Care or Post Acute Services:  Yes  Post Acute Services:  Home Health (Comment: [facility])  Do you feel like you have everything you need to keep you well at home?:  Yes  Care Transitions Interventions         Follow Up  Future Appointments   Date Time Provider Brandi Arias   6/29/2020  2:30 PM Maura Ellis MD [facility] Xochitl Garcia RN

## 2020-06-26 NOTE — ED PROVIDER NOTES
eMERGENCY dEPARTMENT eNCOUnter      [facility]    Chief Complaint   Patient presents with    Fatigue     Pt C/O weakness and SOB starting at 1600 today. SONYA Walton is a 70 y.o. female who presentsto ED from home  By EMS  With complaint of SOB  Onset 4 hours  Intensity of symptoms mild  Patient has a history of advanced dementia and had a recent medication change in her blood pressure medications. Patient lives at home with her daughter takes care of her. On arrival of the EMS the patient O2 sat was 86% on room air. But her heart rate was in the 40s. Patient has any chest pain .     PAST MEDICAL HISTORY    Past Medical History:   Diagnosis Date    Anemia     Chicken pox     Chronic back pain     Dementia ([address] [facility]     Diabetes mellitus [facility]     Gastroesophageal reflux disease without esophagitis 12/7/2017    Headache     Hypertension     Measles     Mumps     Osteoarthritis     Whooping cough        SURGICAL HISTORY    Past Surgical History:   Procedure Laterality Date    CARPAL TUNNEL RELEASE Left 2005    JOINT REPLACEMENT Right 2004    right knee    JOINT REPLACEMENT Left 2005    left knee    JOINT REPLACEMENT Right 2016    right knee    JOINT REPLACEMENT Right 2001    right hip    JOINT REPLACEMENT Left 2006    left hip    TOE SURGERY Left 2006    joint removed from 2nd toe       CURRENT MEDICATIONS    Current Outpatient Rx   Medication Sig Dispense Refill    dilTIAZem (CARDIZEM CD) 180 MG extended release capsule Take 1 capsule by mouth daily (Patient taking differently: Take 240 mg by mouth daily 24 hour release tabs by dr Blank Martin cardiologist) 30 capsule 3    docusate sodium (COLACE, DULCOLAX) 100 MG CAPS Take 100 mg by mouth daily (Patient taking differently: Take 200 mg by mouth daily In morning and in evening) 30 capsule 0    DULoxetine (CYMBALTA) 30 MG extended release capsule Take 1 capsule by mouth nightly (Patient taking differently: Take 30 mg by mouth nightly 30 at night and 60 mg in morning) 30 capsule 3    losartan (COZAAR) 50 MG tablet Take 1 tablet by mouth daily (Patient taking differently: Take 25 mg by mouth daily ) 30 tablet 3    amiodarone (CORDARONE) 200 MG tablet Take 1 tablet by mouth 2 times daily 30 tablet 3    metoprolol succinate (TOPROL XL) 25 MG extended release tablet Take 1 tablet by mouth daily (Patient taking differently: Take 25 mg by mouth daily Takes Atenolol 50 mg instead of metoprolol) 30 tablet 3    oxybutynin (DITROPAN-XL) 5 MG extended release tablet Take 1 tablet by mouth nightly (Patient not taking: Reported on 6/26/2020) 30 tablet 3    QUEtiapine (SEROQUEL) 25 MG tablet Take 1 tablet by mouth nightly (Patient taking differently: Take 125 mg by mouth nightly nightly) 60 tablet 0    QUEtiapine (SEROQUEL) 25 MG tablet Take 0.5 tablets by mouth daily (Patient taking differently: Take 50 mg by mouth daily Q AM) 60 tablet 0    famotidine (PEPCID) 20 MG tablet Take 1 tablet by mouth nightly as needed (indigestion, stomach burning) 90 tablet 1    blood glucose monitor strips Test Blood sugar Twice a day and as needed  Please disense to go with meter 100 strip 5    Blood Glucose Monitoring Suppl (ONE TOUCH ULTRA 2) w/Device KIT 1 kit by Does not apply route daily 1 kit 0    diclofenac sodium 1 % GEL Apply 2 g topically 2 times daily 1 Tube 3    acetaminophen (TYLENOL) 500 MG tablet Take 1,000 mg by mouth As needed not daily      aspirin 325 MG tablet Take 325 mg by mouth      blood glucose monitor kit and supplies Please dispense on that is covered by her insurance tests daily  DX E11.9 1 kit 0    Multiple Vitamins-Minerals (THERAPEUTIC MULTIVITAMIN-MINERALS) tablet Take 1 tablet by mouth daily         ALLERGIES    Allergies   Allergen Reactions    Penicillins Hives    Sulfa Antibiotics Hives    Aspirin Other (See Comments)     Nose bleeds in high doses         FAMILY HISTORY    Family History   Problem Relation Age of Onset   

## 2020-06-27 VITALS
WEIGHT: 200 LBS | BODY MASS INDEX: 28.7 KG/M2 | TEMPERATURE: 97.6 F | SYSTOLIC BLOOD PRESSURE: 107 MMHG | DIASTOLIC BLOOD PRESSURE: 47 MMHG | RESPIRATION RATE: 16 BRPM | OXYGEN SATURATION: 98 % | HEART RATE: 39 BPM

## 2020-06-27 LAB
EKG ATRIAL RATE: 277 BPM
EKG Q-T INTERVAL: 620 MS
EKG QRS DURATION: 106 MS
EKG QTC CALCULATION (BAZETT): 499 MS
EKG R AXIS: 48 DEGREES
EKG T AXIS: 69 DEGREES
EKG VENTRICULAR RATE: 39 BPM

## 2020-06-27 PROCEDURE — 93010 ELECTROCARDIOGRAM REPORT: CPT | Performed by: INTERNAL MEDICINE

## 2020-06-27 NOTE — ED PROVIDER NOTES
Addendum note:    Received patient signout from Dr. Bunny Manning, ED physician, 200 0 hours, regarding patient's presentation and current work-up, awaiting imaging and labs    EKG @ 1956 hrs - ventricular rhythm, rate 39, normal axis,  , I do not appreciate any P waves, however there is a wave immediately after the QRS suggesting source just below the AV node    Lab work-up reviewed, chest x-ray reviewed    Patient did get urine by did a catheter placement as she could not get out of bed, urinalysis reviewed    As patient was having some altered mentation, did get a CT head, radiology report reviewed    Patient's daughter had arrived to the emergency room, discussed patient's current work-up, she does note and have list of patient's previous cardiac medications and current medications, explained at this time I have concerns for patient heart rate in this age group, I will speak with hospitalist here though we do not have cardiology on weekends, likely need for transfer, patient's cardiologist is out of [place]    Case was discussed with Dr. brewer, hospitalist, regarding patient's presentation for work-up, does advise transfer due to no cardiology coverage    Discussed with patient and patient's daughter my conversation with hospitalist, is patient's cardiologist is out of [place] daughter requests transfer same    Case was discussed with [facility] transfer line, regarding patient's presentation work-up, accepted for transfer    Advised patient's daughter of plan transfer to [place], acknowledges    Critical Care Time: 30 minutes, including direct patient interaction, discussion with family, discussion with hospitalist at this facility discussion with [facility] transfer line    Diagnosis:  Bradycardia  General weakness  History dementia          Manuel Calhoun MD  06/27/20 0010

## 2020-06-29 ENCOUNTER — CARE COORDINATION (OUTPATIENT)
Dept: CASE MANAGEMENT | Age: 80
End: 2020-06-29

## 2020-06-29 ENCOUNTER — OFFICE VISIT (OUTPATIENT)
Dept: FAMILY MEDICINE CLINIC | Age: 80
End: 2020-06-29
Payer: MEDICARE

## 2020-06-29 VITALS
HEART RATE: 85 BPM | OXYGEN SATURATION: 97 % | DIASTOLIC BLOOD PRESSURE: 65 MMHG | SYSTOLIC BLOOD PRESSURE: 124 MMHG | RESPIRATION RATE: 16 BRPM

## 2020-06-29 PROBLEM — M48.02 SPINAL STENOSIS OF CERVICAL REGION: Status: ACTIVE | Noted: 2020-02-20

## 2020-06-29 PROCEDURE — 1111F DSCHRG MED/CURRENT MED MERGE: CPT | Performed by: INTERNAL MEDICINE

## 2020-06-29 PROCEDURE — 99496 TRANSJ CARE MGMT HIGH F2F 7D: CPT | Performed by: INTERNAL MEDICINE

## 2020-06-29 RX ORDER — OXYBUTYNIN CHLORIDE 5 MG/1
5 TABLET, EXTENDED RELEASE ORAL NIGHTLY
Qty: 30 TABLET | Refills: 3 | Status: SHIPPED | OUTPATIENT
Start: 2020-06-29

## 2020-06-29 RX ORDER — LOSARTAN POTASSIUM 100 MG/1
TABLET ORAL
COMMUNITY
Start: 2020-06-28 | End: 2020-06-29 | Stop reason: SDUPTHER

## 2020-06-29 RX ORDER — QUETIAPINE FUMARATE 50 MG/1
50 TABLET, FILM COATED ORAL EVERY MORNING
Qty: 30 TABLET | Refills: 5 | Status: SHIPPED | OUTPATIENT
Start: 2020-06-29 | End: 2021-06-17

## 2020-06-29 RX ORDER — BUSPIRONE HYDROCHLORIDE 10 MG/1
10 TABLET ORAL 3 TIMES DAILY
Qty: 90 TABLET | Refills: 1 | Status: SHIPPED | OUTPATIENT
Start: 2020-06-29 | End: 2020-07-09 | Stop reason: SDUPTHER

## 2020-06-29 RX ORDER — QUETIAPINE FUMARATE 100 MG/1
100 TABLET, FILM COATED ORAL
Qty: 30 TABLET | Refills: 3 | Status: SHIPPED | OUTPATIENT
Start: 2020-06-29 | End: 2020-07-10

## 2020-06-29 RX ORDER — QUETIAPINE FUMARATE 25 MG/1
25 TABLET, FILM COATED ORAL NIGHTLY
Qty: 30 TABLET | Refills: 5 | Status: SHIPPED | OUTPATIENT
Start: 2020-06-29 | End: 2020-07-14 | Stop reason: ALTCHOICE

## 2020-06-29 RX ORDER — TRAMADOL HYDROCHLORIDE 50 MG/1
50 TABLET ORAL EVERY 8 HOURS PRN
Qty: 20 TABLET | Refills: 0 | Status: SHIPPED | OUTPATIENT
Start: 2020-06-29 | End: 2020-08-03

## 2020-06-29 RX ORDER — LOSARTAN POTASSIUM 100 MG/1
100 TABLET ORAL DAILY
Qty: 30 TABLET | Refills: 5 | Status: SHIPPED | OUTPATIENT
Start: 2020-06-29

## 2020-06-29 SDOH — ECONOMIC STABILITY: FOOD INSECURITY: WITHIN THE PAST 12 MONTHS, YOU WORRIED THAT YOUR FOOD WOULD RUN OUT BEFORE YOU GOT MONEY TO BUY MORE.: NEVER TRUE

## 2020-06-29 SDOH — ECONOMIC STABILITY: FOOD INSECURITY: WITHIN THE PAST 12 MONTHS, THE FOOD YOU BOUGHT JUST DIDN'T LAST AND YOU DIDN'T HAVE MONEY TO GET MORE.: NEVER TRUE

## 2020-06-29 SDOH — ECONOMIC STABILITY: INCOME INSECURITY: HOW HARD IS IT FOR YOU TO PAY FOR THE VERY BASICS LIKE FOOD, HOUSING, MEDICAL CARE, AND HEATING?: NOT HARD AT ALL

## 2020-06-29 SDOH — ECONOMIC STABILITY: TRANSPORTATION INSECURITY
IN THE PAST 12 MONTHS, HAS LACK OF TRANSPORTATION KEPT YOU FROM MEETINGS, WORK, OR FROM GETTING THINGS NEEDED FOR DAILY LIVING?: NO

## 2020-06-29 SDOH — ECONOMIC STABILITY: TRANSPORTATION INSECURITY
IN THE PAST 12 MONTHS, HAS THE LACK OF TRANSPORTATION KEPT YOU FROM MEDICAL APPOINTMENTS OR FROM GETTING MEDICATIONS?: NO

## 2020-06-29 ASSESSMENT — PATIENT HEALTH QUESTIONNAIRE - PHQ9
SUM OF ALL RESPONSES TO PHQ QUESTIONS 1-9: 0
SUM OF ALL RESPONSES TO PHQ QUESTIONS 1-9: 0
1. LITTLE INTEREST OR PLEASURE IN DOING THINGS: 0
SUM OF ALL RESPONSES TO PHQ9 QUESTIONS 1 & 2: 0
2. FEELING DOWN, DEPRESSED OR HOPELESS: 0

## 2020-06-29 NOTE — PROGRESS NOTES
Post-Discharge Transitional Care Management Services or Hospital Follow Up      Mejia Garcia   YOB: 1940    Date of Office Visit:  6/29/2020  Date of Hospital Admission: 6/26/20  Date of Hospital Discharge: 6/27/20  Risk of hospital readmission (high >=14%.  Medium >=10%) :Readmission Risk Score: 20      Care management risk score Rising risk (score 2-5) and Complex Care (Scores >=6): 6     Non face to face  following discharge, date last encounter closed (first attempt may have been earlier): 6/26/2020  3:33 PM    Call initiated 2 business days of discharge: Yes    Patient Active Problem List   Diagnosis    Essential hypertension    Gastroesophageal reflux disease without esophagitis    Type 2 diabetes mellitus without complication (HCC)    Chronic pain of both shoulders    Anxiety    Atrial fibrillation with RVR (HCC)    Nonexudative age-related macular degeneration, bilateral, early dry stage    Delirium    Encephalopathy, unspecified    Spinal stenosis of cervical region       Allergies   Allergen Reactions    Penicillins Hives    Sulfa Antibiotics Hives    Aspirin Other (See Comments)     Nose bleeds in high doses         Medications listed as ordered at the time of discharge from [facility] Medication Instructions PABLITO:    Printed on:06/29/20 6301   Medication Information                      acetaminophen (TYLENOL) 500 MG tablet  Take 1,000 mg by mouth As needed not daily             amiodarone (CORDARONE) 200 MG tablet  Take 1 tablet by mouth 2 times daily             aspirin 325 MG tablet  Take 325 mg by mouth             atenolol (TENORMIN) 50 MG tablet  Take 50 mg by mouth 2 times daily             blood glucose monitor kit and supplies  Please dispense on that is covered by her insurance tests daily  DX E11.9             blood glucose monitor strips  Test Blood sugar Twice a day and as needed  Please disense to go with meter             Blood Glucose Monitoring Suppl (ONE TOUCH ULTRA 2) w/Device KIT  1 kit by Does not apply route daily             busPIRone (BUSPAR) 10 MG tablet  Take 1 tablet by mouth 3 times daily             diclofenac sodium 1 % GEL  Apply 2 g topically 2 times daily             dilTIAZem (CARDIZEM CD) 180 MG extended release capsule  Take 1 capsule by mouth daily             docusate sodium (COLACE, DULCOLAX) 100 MG CAPS  Take 100 mg by mouth daily             DULoxetine (CYMBALTA) 30 MG extended release capsule  Take 1 capsule by mouth nightly             famotidine (PEPCID) 20 MG tablet  Take 1 tablet by mouth nightly as needed (indigestion, stomach burning)             losartan (COZAAR) 100 MG tablet  Take 1 tablet by mouth daily             losartan (COZAAR) 50 MG tablet  Take 1 tablet by mouth daily             metoprolol succinate (TOPROL XL) 25 MG extended release tablet  Take 1 tablet by mouth daily             Multiple Vitamins-Minerals (THERAPEUTIC MULTIVITAMIN-MINERALS) tablet  Take 1 tablet by mouth daily             oxybutynin (DITROPAN-XL) 5 MG extended release tablet  Take 1 tablet by mouth nightly             QUEtiapine (SEROQUEL) 100 MG tablet  Take 1 tablet by mouth 4 times daily (after meals and at bedtime)             QUEtiapine (SEROQUEL) 25 MG tablet  Take 1 tablet by mouth nightly             QUEtiapine (SEROQUEL) 25 MG tablet  Take 1 tablet by mouth nightly             QUEtiapine (SEROQUEL) 50 MG tablet  Take 1 tablet by mouth every morning             traMADol (ULTRAM) 50 MG tablet  Take 1 tablet by mouth every 8 hours as needed for Pain for up to 7 days. Intended supply: 3 days.  Take lowest dose possible to manage pain                   Medications marked \"taking\" at this time  Outpatient Medications Marked as Taking for the 6/29/20 encounter (Office Visit) with Sabrina Voss MD   Medication Sig Dispense Refill    losartan (COZAAR) 100 MG tablet Take 1 tablet by mouth daily 30 tablet 5    QUEtiapine (SEROQUEL) Medications patient taking as of now reconciled against medications ordered at time of hospital discharge: Yes    Chief Complaint   Patient presents with    Follow-Up from Hospital     states \"feeling pretty good\"       History of Present illness - Follow up of Hospital diagnosis(es):     Inpatient course: Discharge summary reviewed- see chart. Mrs. Edith Hawk presents to office to follow up for recent hospitalizations related to multiple acute problems. She is accompanied by her daughter Neena Ba who is her caregiver at home. The patient initially presented to [facility] ER on May 27th for evaluation of  for evaluation of altered mental status and agitation. She was found to have UTI and was very agitated. Was treated with IV Lorazepam,Haldol,  IVF and Rocephin. Due to delirium and sepsis was transferred to [facility]. During hospital stay she went into Afib with RVR and her Cardizem dose was increased to 280 mg/day and she was also started on Amiodarone. On 6/5/20 she was discharged to [facility] [address] [facility] where she spent 21 days. she was still agitated and was screaming into the night, threatening to throw things . She was started on Seroquel and the dose had to be increased to control psychosis. Also added on Buspar 10 mg tid. She eventually improved and did well with rehab and was discharged home with [place] [id number] on 6/25/20     On 6/26/20 she went to [facility] ER again for evaluation of  weakness,SOB. She was 86 % on RA and her heart rate was in 40s. Apparently at home she was on multiple heart rate controlling meds including Amiodarone, Metoprolol, Atenolol and Cardizem   She was transferred to [facility] where she was evaluated by cardiology Dr. Gerald Lopez. All rate control meds were stopped. Her Losartan was increased to 100 mg/day for HTN. She is supposed to follow up with Dr. Gerald Lopez on 7/29/20 for echo and event recorder. Today Mr. Edith Hawk is very pleasant, calm.  She reports no complaints. She denies having pain, SOB. She is oriented to place but not time. A comprehensive review of systems was negative except for what was noted in the HPI. Vitals:    06/29/20 1448   BP: 124/65   Pulse: 85   Resp: 16   SpO2: 97%     There is no height or weight on file to calculate BMI. Wt Readings from Last 3 Encounters:   06/26/20 200 lb (90.7 kg)   05/29/20 246 lb 4.1 oz (111.7 kg)   05/27/20 192 lb (87.1 kg)     BP Readings from Last 3 Encounters:   06/29/20 124/65   06/27/20 (!) 107/47   06/05/20 (!) 141/59        Physical Exam:  General Appearance: alert and oriented to person, place well developed and well- nourished, in no acute distress  Skin: warm and dry, no rash or erythema  Head: normocephalic and atraumatic  Eyes: pupils equal, round, and reactive to light, conjunctivae normal  ENT: tympanic membrane, external ear and ear canal normal bilaterally, nose without deformity, nasal mucosa and turbinates normal without polyps  Neck: supple and non-tender without mass, no thyromegaly or thyroid nodules, no cervical lymphadenopathy  Pulmonary/Chest: clear to auscultation bilaterally- no wheezes, rales or rhonchi, normal air movement, no respiratory distress  Cardiovascular: normal rate, regular rhythm, normal S1 and S2, no murmurs, rubs, clicks, or gallops  Abdomen: soft, non-tender, non-distended, normal bowel sounds  Extremities: no cyanosis, clubbing or edema  Musculoskeletal: normal range of motion, no joint swelling, deformity or tenderness  Neurologic: reflexes normal and symmetric, no cranial nerve deficit, speech normal    Assessment/Plan:    1. Altered mental status, unspecified altered mental status type  Resolved. Believed to be due to UTI    2. Bradycardia  Resolved. Was seen by Dr. Shawanda Bryson, stopped Cardizem, beta blockers, Amiodarone. Supposed to follow up for echo and event recorder on 7/29/20  HR normal today.     3. Urinary tract infection without hematuria, site unspecified  Resolved. Completed Antibiotic course. 4. Late onset Alzheimer's disease with behavioral disturbance (HCC)  Mood stable. On Seroquel   - QUEtiapine (SEROQUEL) 25 MG tablet; Take 1 tablet by mouth nightly  Dispense: 30 tablet; Refill: 5  - QUEtiapine (SEROQUEL) 100 MG tablet; Take 1 tablet by mouth 4 times daily (after meals and at bedtime)  Dispense: 30 tablet; Refill: 3  - QUEtiapine (SEROQUEL) 50 MG tablet; Take 1 tablet by mouth every morning  Dispense: 30 tablet; Refill: 5    5. Atrial fibrillation with RVR (HCC)  HR normal. Not anticoagulated due to high risk for falls. On  mg/day    6. Essential hypertension  Blood pressure stable on Losartan 100 mg/day  - losartan (COZAAR) 100 MG tablet; Take 1 tablet by mouth daily  Dispense: 30 tablet; Refill: 5    7. General weakness  Improved with PT at SNF. Using a walker. No recent falls. 8. Spinal stenosis of cervical region  On prn Tramadol. Will refill. OARRS reviewed. 9. Chronic neck pain  Refill  Tramadol   - traMADol (ULTRAM) 50 MG tablet; Take 1 tablet by mouth every 8 hours as needed for Pain for up to 7 days. Intended supply: 3 days. Take lowest dose possible to manage pain  Dispense: 20 tablet; Refill: 0    10. Central spinal stenosis    - traMADol (ULTRAM) 50 MG tablet; Take 1 tablet by mouth every 8 hours as needed for Pain for up to 7 days. Intended supply: 3 days. Take lowest dose possible to manage pain  Dispense: 20 tablet;  Refill: 0        Medical Decision Making: high complexity

## 2020-06-30 ENCOUNTER — CARE COORDINATION (OUTPATIENT)
Dept: CASE MANAGEMENT | Age: 80
End: 2020-06-30

## 2020-06-30 NOTE — CARE COORDINATION
[facility] Transitions Follow Up Call    2020    Patient: Suzi Underwood  Patient : 1940   MRN: [id number]  Reason for Admission:   Discharge Date: 20 RARS: Readmission Risk Score: 20    Follow Up: Attempted to contact patient for BPCI-A follow up. Unable to reach patient. Left message with contact information and request for call back.       Future Appointments   Date Time Provider Brandi Arias   2020  3:00 PM Rigo May MD [facility] Rupert Capone RN

## 2020-07-07 ENCOUNTER — CARE COORDINATION (OUTPATIENT)
Dept: CASE MANAGEMENT | Age: 80
End: 2020-07-07

## 2020-07-07 NOTE — CARE COORDINATION
[facility] Transitions Follow Up Call    2020    Patient: Faviola Causey  Patient : 1940   MRN: [id number]  Reason for Admission:   Discharge Date: 20 RARS: Readmission Risk Score: 20    Follow Up: Attempted to contact patient for BPCI-A follow up. Unable to reach patient. Left message with contact information and request for call back.     Future Appointments   Date Time Provider Brandi Arias   2020  3:00 PM Gina Cheema MD [facility] Misbah Olson RN

## 2020-07-09 RX ORDER — BUSPIRONE HYDROCHLORIDE 10 MG/1
10 TABLET ORAL 3 TIMES DAILY
Qty: 90 TABLET | Refills: 3 | Status: SHIPPED | OUTPATIENT
Start: 2020-07-09 | End: 2020-11-06

## 2020-07-10 RX ORDER — QUETIAPINE FUMARATE 100 MG/1
100 TABLET, FILM COATED ORAL NIGHTLY
Qty: 30 TABLET | Refills: 3
Start: 2020-07-10 | End: 2021-06-17

## 2020-07-14 ENCOUNTER — HOSPITAL ENCOUNTER (INPATIENT)
Age: 80
LOS: 4 days | Discharge: NURSING, REHAB OR LONG-TERM CARE | DRG: 309 | End: 2020-07-18
Attending: FAMILY MEDICINE | Admitting: INTERNAL MEDICINE
Payer: MEDICARE

## 2020-07-14 ENCOUNTER — APPOINTMENT (OUTPATIENT)
Dept: GENERAL RADIOLOGY | Age: 80
DRG: 309 | End: 2020-07-14
Payer: MEDICARE

## 2020-07-14 LAB
-: ABNORMAL
ABSOLUTE EOS #: 0 K/UL (ref 0–0.4)
ABSOLUTE IMMATURE GRANULOCYTE: NORMAL K/UL (ref 0–0.3)
ABSOLUTE LYMPH #: 2.2 K/UL (ref 1–4.8)
ABSOLUTE MONO #: 0.6 K/UL (ref 0–1)
AMORPHOUS: ABNORMAL
ANION GAP SERPL CALCULATED.3IONS-SCNC: 14 MMOL/L (ref 9–17)
BACTERIA: ABNORMAL
BASOPHILS # BLD: 0 % (ref 0–2)
BASOPHILS ABSOLUTE: 0 K/UL (ref 0–0.2)
BILIRUBIN URINE: NEGATIVE
BNP INTERPRETATION: ABNORMAL
BUN BLDV-MCNC: 16 MG/DL (ref 8–23)
BUN/CREAT BLD: 18 (ref 9–20)
CALCIUM SERPL-MCNC: 10.2 MG/DL (ref 8.6–10.4)
CASTS UA: ABNORMAL /LPF
CHLORIDE BLD-SCNC: 89 MMOL/L (ref 98–107)
CO2: 22 MMOL/L (ref 20–31)
COLOR: YELLOW
COMMENT UA: ABNORMAL
CREAT SERPL-MCNC: 0.88 MG/DL (ref 0.5–0.9)
CRYSTALS, UA: ABNORMAL /HPF
DIFFERENTIAL TYPE: YES
EOSINOPHILS RELATIVE PERCENT: 1 % (ref 0–5)
EPITHELIAL CELLS UA: ABNORMAL /HPF
GFR AFRICAN AMERICAN: >60 ML/MIN
GFR NON-AFRICAN AMERICAN: >60 ML/MIN
GFR SERPL CREATININE-BSD FRML MDRD: ABNORMAL ML/MIN/{1.73_M2}
GFR SERPL CREATININE-BSD FRML MDRD: ABNORMAL ML/MIN/{1.73_M2}
GLUCOSE BLD-MCNC: 195 MG/DL (ref 70–99)
GLUCOSE URINE: NEGATIVE
HCT VFR BLD CALC: 39.1 % (ref 36–46)
HEMOGLOBIN: 13.7 G/DL (ref 12–16)
IMMATURE GRANULOCYTES: NORMAL %
INR BLD: 1
KETONES, URINE: ABNORMAL
LEUKOCYTE ESTERASE, URINE: ABNORMAL
LYMPHOCYTES # BLD: 28 % (ref 15–40)
MCH RBC QN AUTO: 32.7 PG (ref 26–34)
MCHC RBC AUTO-ENTMCNC: 35 G/DL (ref 31–37)
MCV RBC AUTO: 93.6 FL (ref 80–100)
MONOCYTES # BLD: 8 % (ref 4–8)
MUCUS: ABNORMAL
NITRITE, URINE: NEGATIVE
NRBC AUTOMATED: NORMAL PER 100 WBC
OTHER OBSERVATIONS UA: ABNORMAL
PDW BLD-RTO: 13.9 % (ref 12.1–15.2)
PH UA: 8 (ref 5–8)
PLATELET # BLD: 331 K/UL (ref 140–450)
PLATELET ESTIMATE: NORMAL
PMV BLD AUTO: NORMAL FL (ref 6–12)
POTASSIUM SERPL-SCNC: 4.6 MMOL/L (ref 3.7–5.3)
PRO-BNP: 1892 PG/ML
PROTEIN UA: ABNORMAL
PROTHROMBIN TIME: 12.6 SEC (ref 11.5–14.2)
RBC # BLD: 4.18 M/UL (ref 4–5.2)
RBC # BLD: NORMAL 10*6/UL
RBC UA: ABNORMAL /HPF (ref 0–2)
RENAL EPITHELIAL, UA: ABNORMAL /HPF
SARS-COV-2, PCR: NORMAL
SARS-COV-2, RAPID: NOT DETECTED
SARS-COV-2: NORMAL
SEG NEUTROPHILS: 63 % (ref 47–75)
SEGMENTED NEUTROPHILS ABSOLUTE COUNT: 5 K/UL (ref 2.5–7)
SODIUM BLD-SCNC: 125 MMOL/L (ref 135–144)
SOURCE: NORMAL
SPECIFIC GRAVITY UA: 1.01 (ref 1–1.03)
TRICHOMONAS: ABNORMAL
TROPONIN INTERP: NORMAL
TROPONIN T: <0.03 NG/ML
TROPONIN, HIGH SENSITIVITY: NORMAL NG/L (ref 0–14)
TURBIDITY: ABNORMAL
URINE HGB: ABNORMAL
UROBILINOGEN, URINE: NORMAL
WBC # BLD: 7.9 K/UL (ref 3.5–11)
WBC # BLD: NORMAL 10*3/UL
WBC UA: ABNORMAL /HPF
YEAST: ABNORMAL

## 2020-07-14 PROCEDURE — 81001 URINALYSIS AUTO W/SCOPE: CPT

## 2020-07-14 PROCEDURE — 96375 TX/PRO/DX INJ NEW DRUG ADDON: CPT

## 2020-07-14 PROCEDURE — 87086 URINE CULTURE/COLONY COUNT: CPT

## 2020-07-14 PROCEDURE — 83880 ASSAY OF NATRIURETIC PEPTIDE: CPT

## 2020-07-14 PROCEDURE — 85610 PROTHROMBIN TIME: CPT

## 2020-07-14 PROCEDURE — 2500000003 HC RX 250 WO HCPCS: Performed by: FAMILY MEDICINE

## 2020-07-14 PROCEDURE — 2580000003 HC RX 258: Performed by: FAMILY MEDICINE

## 2020-07-14 PROCEDURE — 80048 BASIC METABOLIC PNL TOTAL CA: CPT

## 2020-07-14 PROCEDURE — U0002 COVID-19 LAB TEST NON-CDC: HCPCS

## 2020-07-14 PROCEDURE — 99285 EMERGENCY DEPT VISIT HI MDM: CPT

## 2020-07-14 PROCEDURE — 93005 ELECTROCARDIOGRAM TRACING: CPT | Performed by: FAMILY MEDICINE

## 2020-07-14 PROCEDURE — 36415 COLL VENOUS BLD VENIPUNCTURE: CPT

## 2020-07-14 PROCEDURE — 83036 HEMOGLOBIN GLYCOSYLATED A1C: CPT

## 2020-07-14 PROCEDURE — 84484 ASSAY OF TROPONIN QUANT: CPT

## 2020-07-14 PROCEDURE — 1200000000 HC SEMI PRIVATE

## 2020-07-14 PROCEDURE — 6360000002 HC RX W HCPCS: Performed by: FAMILY MEDICINE

## 2020-07-14 PROCEDURE — 87186 SC STD MICRODIL/AGAR DIL: CPT

## 2020-07-14 PROCEDURE — 2580000003 HC RX 258: Performed by: INTERNAL MEDICINE

## 2020-07-14 PROCEDURE — 87088 URINE BACTERIA CULTURE: CPT

## 2020-07-14 PROCEDURE — 71045 X-RAY EXAM CHEST 1 VIEW: CPT

## 2020-07-14 PROCEDURE — 96374 THER/PROPH/DIAG INJ IV PUSH: CPT

## 2020-07-14 PROCEDURE — 85025 COMPLETE CBC W/AUTO DIFF WBC: CPT

## 2020-07-14 RX ORDER — SODIUM CHLORIDE 0.9 % (FLUSH) 0.9 %
10 SYRINGE (ML) INJECTION EVERY 12 HOURS SCHEDULED
Status: DISCONTINUED | OUTPATIENT
Start: 2020-07-15 | End: 2020-07-18 | Stop reason: HOSPADM

## 2020-07-14 RX ORDER — ONDANSETRON 2 MG/ML
4 INJECTION INTRAMUSCULAR; INTRAVENOUS EVERY 6 HOURS PRN
Status: DISCONTINUED | OUTPATIENT
Start: 2020-07-14 | End: 2020-07-18 | Stop reason: HOSPADM

## 2020-07-14 RX ORDER — M-VIT,TX,IRON,MINS/CALC/FOLIC 27MG-0.4MG
1 TABLET ORAL DAILY
Status: DISCONTINUED | OUTPATIENT
Start: 2020-07-15 | End: 2020-07-18 | Stop reason: HOSPADM

## 2020-07-14 RX ORDER — SODIUM CHLORIDE 9 MG/ML
INJECTION, SOLUTION INTRAVENOUS CONTINUOUS
Status: DISCONTINUED | OUTPATIENT
Start: 2020-07-15 | End: 2020-07-17

## 2020-07-14 RX ORDER — PROMETHAZINE HYDROCHLORIDE 25 MG/1
12.5 TABLET ORAL EVERY 6 HOURS PRN
Status: DISCONTINUED | OUTPATIENT
Start: 2020-07-14 | End: 2020-07-18 | Stop reason: HOSPADM

## 2020-07-14 RX ORDER — QUETIAPINE FUMARATE 100 MG/1
100 TABLET, FILM COATED ORAL NIGHTLY
Status: DISCONTINUED | OUTPATIENT
Start: 2020-07-15 | End: 2020-07-18 | Stop reason: HOSPADM

## 2020-07-14 RX ORDER — METOPROLOL TARTRATE 5 MG/5ML
2.5 INJECTION INTRAVENOUS ONCE
Status: COMPLETED | OUTPATIENT
Start: 2020-07-14 | End: 2020-07-14

## 2020-07-14 RX ORDER — DEXTROSE MONOHYDRATE 25 G/50ML
12.5 INJECTION, SOLUTION INTRAVENOUS PRN
Status: DISCONTINUED | OUTPATIENT
Start: 2020-07-14 | End: 2020-07-18 | Stop reason: HOSPADM

## 2020-07-14 RX ORDER — DEXTROSE MONOHYDRATE 50 MG/ML
100 INJECTION, SOLUTION INTRAVENOUS PRN
Status: DISCONTINUED | OUTPATIENT
Start: 2020-07-14 | End: 2020-07-18 | Stop reason: HOSPADM

## 2020-07-14 RX ORDER — TRAMADOL HYDROCHLORIDE 50 MG/1
50 TABLET ORAL EVERY 8 HOURS PRN
Status: DISCONTINUED | OUTPATIENT
Start: 2020-07-14 | End: 2020-07-18 | Stop reason: HOSPADM

## 2020-07-14 RX ORDER — ACETAMINOPHEN 500 MG
1000 TABLET ORAL EVERY 6 HOURS PRN
Status: DISCONTINUED | OUTPATIENT
Start: 2020-07-14 | End: 2020-07-14 | Stop reason: SDUPTHER

## 2020-07-14 RX ORDER — TRAMADOL HYDROCHLORIDE 50 MG/1
50 TABLET ORAL EVERY 8 HOURS PRN
Status: ON HOLD | COMMUNITY
End: 2020-07-18 | Stop reason: SDUPTHER

## 2020-07-14 RX ORDER — ACETAMINOPHEN 650 MG/1
650 SUPPOSITORY RECTAL EVERY 6 HOURS PRN
Status: DISCONTINUED | OUTPATIENT
Start: 2020-07-14 | End: 2020-07-18 | Stop reason: HOSPADM

## 2020-07-14 RX ORDER — PANTOPRAZOLE SODIUM 40 MG/1
40 TABLET, DELAYED RELEASE ORAL
Status: DISCONTINUED | OUTPATIENT
Start: 2020-07-15 | End: 2020-07-18 | Stop reason: HOSPADM

## 2020-07-14 RX ORDER — SODIUM CHLORIDE 0.9 % (FLUSH) 0.9 %
10 SYRINGE (ML) INJECTION PRN
Status: DISCONTINUED | OUTPATIENT
Start: 2020-07-14 | End: 2020-07-18 | Stop reason: HOSPADM

## 2020-07-14 RX ORDER — POLYETHYLENE GLYCOL 3350 17 G/17G
17 POWDER, FOR SOLUTION ORAL DAILY PRN
Status: DISCONTINUED | OUTPATIENT
Start: 2020-07-14 | End: 2020-07-18 | Stop reason: HOSPADM

## 2020-07-14 RX ORDER — ASPIRIN 325 MG
325 TABLET ORAL DAILY
Status: DISCONTINUED | OUTPATIENT
Start: 2020-07-15 | End: 2020-07-18 | Stop reason: HOSPADM

## 2020-07-14 RX ORDER — ACETAMINOPHEN 325 MG/1
650 TABLET ORAL EVERY 6 HOURS PRN
Status: DISCONTINUED | OUTPATIENT
Start: 2020-07-14 | End: 2020-07-18 | Stop reason: HOSPADM

## 2020-07-14 RX ORDER — GLIMEPIRIDE 2 MG/1
1 TABLET ORAL 2 TIMES DAILY
Status: DISCONTINUED | OUTPATIENT
Start: 2020-07-15 | End: 2020-07-18 | Stop reason: HOSPADM

## 2020-07-14 RX ORDER — GLIMEPIRIDE 1 MG/1
1 TABLET ORAL 2 TIMES DAILY
COMMUNITY

## 2020-07-14 RX ORDER — DULOXETIN HYDROCHLORIDE 30 MG/1
30 CAPSULE, DELAYED RELEASE ORAL NIGHTLY
Status: DISCONTINUED | OUTPATIENT
Start: 2020-07-15 | End: 2020-07-18 | Stop reason: HOSPADM

## 2020-07-14 RX ORDER — BUSPIRONE HYDROCHLORIDE 5 MG/1
10 TABLET ORAL 3 TIMES DAILY
Status: DISCONTINUED | OUTPATIENT
Start: 2020-07-15 | End: 2020-07-18 | Stop reason: HOSPADM

## 2020-07-14 RX ORDER — 0.9 % SODIUM CHLORIDE 0.9 %
500 INTRAVENOUS SOLUTION INTRAVENOUS ONCE
Status: COMPLETED | OUTPATIENT
Start: 2020-07-14 | End: 2020-07-14

## 2020-07-14 RX ORDER — QUETIAPINE FUMARATE 25 MG/1
25 TABLET, FILM COATED ORAL NIGHTLY
Status: DISCONTINUED | OUTPATIENT
Start: 2020-07-15 | End: 2020-07-18 | Stop reason: HOSPADM

## 2020-07-14 RX ORDER — OXYBUTYNIN CHLORIDE 5 MG/1
5 TABLET, EXTENDED RELEASE ORAL NIGHTLY
Status: DISCONTINUED | OUTPATIENT
Start: 2020-07-15 | End: 2020-07-18 | Stop reason: HOSPADM

## 2020-07-14 RX ORDER — LOSARTAN POTASSIUM 50 MG/1
50 TABLET ORAL DAILY
Status: DISCONTINUED | OUTPATIENT
Start: 2020-07-15 | End: 2020-07-18 | Stop reason: HOSPADM

## 2020-07-14 RX ORDER — QUETIAPINE FUMARATE 25 MG/1
50 TABLET, FILM COATED ORAL EVERY MORNING
Status: DISCONTINUED | OUTPATIENT
Start: 2020-07-15 | End: 2020-07-18 | Stop reason: HOSPADM

## 2020-07-14 RX ORDER — OMEPRAZOLE 20 MG/1
20 CAPSULE, DELAYED RELEASE ORAL 2 TIMES DAILY
COMMUNITY
End: 2021-06-17

## 2020-07-14 RX ORDER — NICOTINE POLACRILEX 4 MG
15 LOZENGE BUCCAL PRN
Status: DISCONTINUED | OUTPATIENT
Start: 2020-07-14 | End: 2020-07-18 | Stop reason: HOSPADM

## 2020-07-14 RX ADMIN — SODIUM CHLORIDE: 9 INJECTION, SOLUTION INTRAVENOUS at 23:59

## 2020-07-14 RX ADMIN — WATER 1 G: 1 INJECTION INTRAMUSCULAR; INTRAVENOUS; SUBCUTANEOUS at 22:31

## 2020-07-14 RX ADMIN — SODIUM CHLORIDE 500 ML: 9 INJECTION, SOLUTION INTRAVENOUS at 22:32

## 2020-07-14 RX ADMIN — METOPROLOL TARTRATE 2.5 MG: 5 INJECTION, SOLUTION INTRAVENOUS at 22:10

## 2020-07-15 LAB
ABSOLUTE EOS #: 0.1 K/UL (ref 0–0.4)
ABSOLUTE IMMATURE GRANULOCYTE: ABNORMAL K/UL (ref 0–0.3)
ABSOLUTE LYMPH #: 2.9 K/UL (ref 1–4.8)
ABSOLUTE MONO #: 0.6 K/UL (ref 0–1)
ANION GAP SERPL CALCULATED.3IONS-SCNC: 10 MMOL/L (ref 9–17)
BASOPHILS # BLD: 0 % (ref 0–2)
BASOPHILS ABSOLUTE: 0 K/UL (ref 0–0.2)
BUN BLDV-MCNC: 14 MG/DL (ref 8–23)
BUN/CREAT BLD: ABNORMAL (ref 9–20)
CALCIUM SERPL-MCNC: 9.8 MG/DL (ref 8.6–10.4)
CHLORIDE BLD-SCNC: 94 MMOL/L (ref 98–107)
CO2: 24 MMOL/L (ref 20–31)
CREAT SERPL-MCNC: 0.63 MG/DL (ref 0.5–0.9)
DIFFERENTIAL TYPE: YES
EKG ATRIAL RATE: 84 BPM
EKG Q-T INTERVAL: 332 MS
EKG QRS DURATION: 108 MS
EKG QTC CALCULATION (BAZETT): 505 MS
EKG R AXIS: 65 DEGREES
EKG T AXIS: -83 DEGREES
EKG VENTRICULAR RATE: 139 BPM
EOSINOPHILS RELATIVE PERCENT: 2 % (ref 0–5)
ESTIMATED AVERAGE GLUCOSE: 126 MG/DL
GFR AFRICAN AMERICAN: >60 ML/MIN
GFR NON-AFRICAN AMERICAN: >60 ML/MIN
GFR SERPL CREATININE-BSD FRML MDRD: ABNORMAL ML/MIN/{1.73_M2}
GFR SERPL CREATININE-BSD FRML MDRD: ABNORMAL ML/MIN/{1.73_M2}
GLUCOSE BLD-MCNC: 105 MG/DL (ref 65–99)
GLUCOSE BLD-MCNC: 80 MG/DL (ref 65–99)
GLUCOSE BLD-MCNC: 84 MG/DL (ref 65–99)
GLUCOSE BLD-MCNC: 87 MG/DL (ref 65–99)
GLUCOSE BLD-MCNC: 93 MG/DL (ref 65–99)
GLUCOSE BLD-MCNC: 95 MG/DL (ref 70–99)
GLUCOSE BLD-MCNC: 97 MG/DL (ref 65–99)
HBA1C MFR BLD: 6 % (ref 4–6)
HCT VFR BLD CALC: 34.8 % (ref 36–46)
HEMOGLOBIN: 12.1 G/DL (ref 12–16)
IMMATURE GRANULOCYTES: ABNORMAL %
LYMPHOCYTES # BLD: 42 % (ref 15–40)
MCH RBC QN AUTO: 32.7 PG (ref 26–34)
MCHC RBC AUTO-ENTMCNC: 34.8 G/DL (ref 31–37)
MCV RBC AUTO: 93.8 FL (ref 80–100)
MONOCYTES # BLD: 8 % (ref 4–8)
NRBC AUTOMATED: ABNORMAL PER 100 WBC
PDW BLD-RTO: 13.5 % (ref 12.1–15.2)
PLATELET # BLD: 268 K/UL (ref 140–450)
PLATELET ESTIMATE: ABNORMAL
PMV BLD AUTO: ABNORMAL FL (ref 6–12)
POTASSIUM SERPL-SCNC: 4.2 MMOL/L (ref 3.7–5.3)
RBC # BLD: 3.72 M/UL (ref 4–5.2)
RBC # BLD: ABNORMAL 10*6/UL
SEG NEUTROPHILS: 48 % (ref 47–75)
SEGMENTED NEUTROPHILS ABSOLUTE COUNT: 3.4 K/UL (ref 2.5–7)
SODIUM BLD-SCNC: 128 MMOL/L (ref 135–144)
TROPONIN INTERP: NORMAL
TROPONIN INTERP: NORMAL
TROPONIN T: <0.03 NG/ML
TROPONIN T: <0.03 NG/ML
TROPONIN, HIGH SENSITIVITY: NORMAL NG/L (ref 0–14)
TROPONIN, HIGH SENSITIVITY: NORMAL NG/L (ref 0–14)
WBC # BLD: 7.1 K/UL (ref 3.5–11)
WBC # BLD: ABNORMAL 10*3/UL

## 2020-07-15 PROCEDURE — 94761 N-INVAS EAR/PLS OXIMETRY MLT: CPT

## 2020-07-15 PROCEDURE — 6370000000 HC RX 637 (ALT 250 FOR IP): Performed by: INTERNAL MEDICINE

## 2020-07-15 PROCEDURE — 97166 OT EVAL MOD COMPLEX 45 MIN: CPT

## 2020-07-15 PROCEDURE — 85025 COMPLETE CBC W/AUTO DIFF WBC: CPT

## 2020-07-15 PROCEDURE — 99222 1ST HOSP IP/OBS MODERATE 55: CPT | Performed by: INTERNAL MEDICINE

## 2020-07-15 PROCEDURE — 80048 BASIC METABOLIC PNL TOTAL CA: CPT

## 2020-07-15 PROCEDURE — 82947 ASSAY GLUCOSE BLOOD QUANT: CPT

## 2020-07-15 PROCEDURE — 2580000003 HC RX 258: Performed by: INTERNAL MEDICINE

## 2020-07-15 PROCEDURE — 6360000002 HC RX W HCPCS: Performed by: INTERNAL MEDICINE

## 2020-07-15 PROCEDURE — 1200000000 HC SEMI PRIVATE

## 2020-07-15 PROCEDURE — 97162 PT EVAL MOD COMPLEX 30 MIN: CPT

## 2020-07-15 PROCEDURE — 93010 ELECTROCARDIOGRAM REPORT: CPT | Performed by: INTERNAL MEDICINE

## 2020-07-15 PROCEDURE — 84484 ASSAY OF TROPONIN QUANT: CPT

## 2020-07-15 RX ORDER — LORAZEPAM 2 MG/ML
1 INJECTION INTRAMUSCULAR ONCE
Status: COMPLETED | OUTPATIENT
Start: 2020-07-15 | End: 2020-07-15

## 2020-07-15 RX ADMIN — OXYBUTYNIN CHLORIDE 5 MG: 5 TABLET, EXTENDED RELEASE ORAL at 01:12

## 2020-07-15 RX ADMIN — BUSPIRONE HYDROCHLORIDE 10 MG: 5 TABLET ORAL at 17:09

## 2020-07-15 RX ADMIN — METFORMIN HYDROCHLORIDE 500 MG: 500 TABLET ORAL at 17:09

## 2020-07-15 RX ADMIN — ENOXAPARIN SODIUM 40 MG: 40 INJECTION SUBCUTANEOUS at 08:53

## 2020-07-15 RX ADMIN — CEFTRIAXONE SODIUM 1 G: 1 INJECTION, POWDER, FOR SOLUTION INTRAMUSCULAR; INTRAVENOUS at 21:02

## 2020-07-15 RX ADMIN — PANTOPRAZOLE SODIUM 40 MG: 40 TABLET, DELAYED RELEASE ORAL at 06:49

## 2020-07-15 RX ADMIN — BUSPIRONE HYDROCHLORIDE 10 MG: 5 TABLET ORAL at 20:45

## 2020-07-15 RX ADMIN — BUSPIRONE HYDROCHLORIDE 10 MG: 5 TABLET ORAL at 08:53

## 2020-07-15 RX ADMIN — METOPROLOL TARTRATE 12.5 MG: 25 TABLET, FILM COATED ORAL at 20:44

## 2020-07-15 RX ADMIN — SODIUM CHLORIDE: 9 INJECTION, SOLUTION INTRAVENOUS at 21:02

## 2020-07-15 RX ADMIN — DICLOFENAC 2 G: 10 GEL TOPICAL at 21:04

## 2020-07-15 RX ADMIN — MULTIPLE VITAMINS W/ MINERALS TAB 1 TABLET: TAB at 08:54

## 2020-07-15 RX ADMIN — QUETIAPINE FUMARATE 100 MG: 100 TABLET ORAL at 01:10

## 2020-07-15 RX ADMIN — LORAZEPAM 1 MG: 2 INJECTION, SOLUTION INTRAMUSCULAR; INTRAVENOUS at 19:00

## 2020-07-15 RX ADMIN — TRAMADOL HYDROCHLORIDE 50 MG: 50 TABLET, FILM COATED ORAL at 18:08

## 2020-07-15 RX ADMIN — METFORMIN HYDROCHLORIDE 500 MG: 500 TABLET ORAL at 08:54

## 2020-07-15 RX ADMIN — METOPROLOL TARTRATE 12.5 MG: 25 TABLET, FILM COATED ORAL at 08:54

## 2020-07-15 RX ADMIN — METOPROLOL TARTRATE 12.5 MG: 25 TABLET, FILM COATED ORAL at 01:09

## 2020-07-15 RX ADMIN — LOSARTAN POTASSIUM 50 MG: 50 TABLET ORAL at 08:54

## 2020-07-15 RX ADMIN — QUETIAPINE FUMARATE 50 MG: 25 TABLET ORAL at 08:53

## 2020-07-15 RX ADMIN — ASPIRIN 325 MG ORAL TABLET 325 MG: 325 PILL ORAL at 09:08

## 2020-07-15 RX ADMIN — QUETIAPINE FUMARATE 25 MG: 100 TABLET ORAL at 01:09

## 2020-07-15 RX ADMIN — GLIMEPIRIDE 1 MG: 2 TABLET ORAL at 08:53

## 2020-07-15 RX ADMIN — BUSPIRONE HYDROCHLORIDE 10 MG: 5 TABLET ORAL at 01:09

## 2020-07-15 RX ADMIN — DULOXETINE HYDROCHLORIDE 30 MG: 30 CAPSULE, DELAYED RELEASE ORAL at 01:08

## 2020-07-15 RX ADMIN — GLIMEPIRIDE 1 MG: 2 TABLET ORAL at 20:44

## 2020-07-15 RX ADMIN — GLIMEPIRIDE 1 MG: 2 TABLET ORAL at 01:09

## 2020-07-15 RX ADMIN — QUETIAPINE FUMARATE 125 MG: 100 TABLET ORAL at 20:43

## 2020-07-15 RX ADMIN — DULOXETINE HYDROCHLORIDE 30 MG: 30 CAPSULE, DELAYED RELEASE ORAL at 20:44

## 2020-07-15 RX ADMIN — OXYBUTYNIN CHLORIDE 5 MG: 5 TABLET, EXTENDED RELEASE ORAL at 21:02

## 2020-07-15 ASSESSMENT — PAIN SCALES - GENERAL
PAINLEVEL_OUTOF10: 0
PAINLEVEL_OUTOF10: 6

## 2020-07-15 ASSESSMENT — PAIN DESCRIPTION - LOCATION: LOCATION: BACK;NECK

## 2020-07-15 NOTE — PROGRESS NOTES
Pt yelling out in room for daughter and God. Attempt to reorient pt. Pt wants up to the bathroom. Informed pt she was just up to the bathroom 10 minutes prior. Pt does not remember getting up to bathroom previously. Pt taken with 2 assist to bathroom and back to bed. Pt did not urinate at all. Bladder scan completed with 149 mL in bladder at this time. Pt repositioned in bed. Bed alarm on for pt safety. Will continue to monitor.

## 2020-07-15 NOTE — CONSULTS
[place] [id number]                                  CONSULTATION    PATIENT NAME: Dora Abel                     :        1940  MED REC NO:   [id number]                              ROOM:       0061  ACCOUNT NO:   [de-identified]                           ADMIT DATE: 2020  PROVIDER:     Bucky Gao    CONSULT DATE:  07/15/2020    REASON FOR CONSULT:  Atrial fibrillation with RVR. HISTORY OF PRESENT ILLNESS:  The patient is a 77-year-old female with  rather advanced dementia. I was unable to get any history from her this  morning. Therefore, my history is taken from her chart. She has never had a cardiac catheterization. She had a stress test in  2018 that did not show any evidence of ischemia and she has a history  of normal LV function. She has had paroxysmal atrial fibrillation first noted on 2018. She was asymptomatic. I saw her in consult during that hospitalization. An echocardiogram showed normal LV function, EF of 60%. Her heart rate was stabilized. She was discharged to follow up with Dr. Willard Crandall. She was anticoagulated with Eliquis. A Lexiscan Cardiolite stress test  on 2018, was normal.  She was in atrial fibrillation at that time. She had a cardioversion on 2018; however, redeveloped atrial  fibrillation. On 2018, she developed a GI bleed and Eliquis had  been stopped, she was in sinus rhythm at that time. She has been followed by Dr. Willard Crandall. She was seen on 2019, and she  had had several falls. Therefore, her Eliquis was stopped at that time. She was in sinus rhythm. On 2020, she saw Dr. Willard Crandall and again was in sinus rhythm, on no  Eliquis.   There was a consideration for a Watchman device, but it would  require her to go back on anticoagulation; therefore, this has not been  done as of yet.    She was hospitalized on 05/27 for delirium at [facility]. She had an  underlying UTI at that time. She did see cardiology and she was placed  on amiodarone 200 mg twice a day as well as Cardizem  mg daily and  metoprolol 25 mg daily on discharge. She presented to our emergency room on 06/26/2020, with bradycardia and  was transferred to [facility] on 06/27. She was seen by Dr. Sho Caruso. Her  metoprolol, Cardizem and amiodarone were all stopped on discharge. She was seen by Dr. Karlos Owen on 06/29, and at that time, was doing  well. She was not on Cardizem, amiodarone or Toprol. She was feeling  good at that time. She was brought to the emergency room on 07/14 at 9 o'clock in the  evening because of \"chest pain. \"  She had called her daughter and her  daughter brought her to the emergency room. However, in the emergency  room, she denied any chest pain. An EKG showed tachycardia at 130 beats per minute. It appeared to be  atrial flutter with 2:1 conduction. However, in the emergency room, her  heart rate was slowed after Lopressor 2.5 mg IV and I could see that it  was actually atrial fibrillation. She was admitted for further care. She was resting comfortably this morning and I did not awaken her. She  was confused during the night. She has been started on Lopressor 12.5 mg b.i.d. by Dr. Karlos Owen. This  morning, her heart rate was in the 80s, still in atrial fibrillation. Her cardiac enzymes have been negative. Repeat EKG is pending this  morning. She denied any chest pain or chest discomfort. Again, she has never had a myocardial infarction or cardiac  catheterization. There has been no documented coronary artery disease,  even though it is listed as one of her problems. CARDIAC RISK FACTORS:  Hypertension:  Positive. Hyperlipidemia:  Positive. Other Family Members:  Unknown. Diabetes:  Positive. Smoking:  Negative.     MEDICATIONS PRIOR TO ADMISSION:  She 6:22:15       T: 07/15/2020 8:25:42     [email or web address]  Job#: [id number]     Doc#: [id number]    CC:  Richard Crockett

## 2020-07-15 NOTE — ED NOTES
Patient instructed to use vagal maneuver for HR of 141 reading SVT.       Yves Cartagena RN  07/14/20 [id number]

## 2020-07-15 NOTE — PROGRESS NOTES
Comprehensive Nutrition Assessment    Type and Reason for Visit:  Initial    Nutrition Recommendations/Plan: continue current diet    Nutrition Assessment:  Overweight/obesity r/t excess energy intakes aeb BMI 39.12.Good meal intakes, A1c with good control from last year at 5.6. Na 128. Glucose 95. Pt with dementia and education is not appropriate. Pt confused. States PO has been good. Malnutrition Assessment:  Malnutrition Status:  No malnutrition    Context:  Acute Illness     Findings of the 6 clinical characteristics of malnutrition:  Energy Intake:  No significant decrease in energy intake  Weight Loss:  No significant weight loss     Body Fat Loss:  No significant body fat loss     Muscle Mass Loss:  No significant muscle mass loss    Fluid Accumulation:  No significant fluid accumulation     Strength:  Not Performed    Estimated Daily Nutrient Needs:  Energy (kcal):   ; Weight Used for Energy Requirements:        Protein (g):   ; Weight Used for Protein Requirements:           Fluid (ml/day):   ; Weight Used for Fluid Requirements:         Nutrition Related Findings:         Wounds:  None       Current Nutrition Therapies:    DIET GENERAL;     Anthropometric Measures:  · Height: 5' 5\" (165.1 cm)  · Current Body Weight: 235 lb 1.6 oz (106.6 kg)   · Admission Body Weight: 235 lb 1.6 oz (106.6 kg)    · Usual Body Weight: 226 lb (102.5 kg)(1/23/20)     · Ideal Body Weight: 125 lbs; 188.1 lbs   · BMI: 39.1  · BMI Categories: Obese Class 3 (BMI 40.0 or greater)       Nutrition Diagnosis:   · Overweight/Obese related to excessive energy intake as evidenced by EYX(70.79)      Nutrition Interventions:   Food and/or Nutrient Delivery:     Nutrition Education/Counseling:  No recommendation at this time   Coordination of Nutrition Care:  No recommendation at this time    Goals:  PO > 75% of meals       Nutrition Monitoring and Evaluation:   Behavioral-Environmental Outcomes:      Food/Nutrient Intake Outcomes: Food and Nutrient Intake  Physical Signs/Symptoms Outcomes:  Weight     Discharge Planning:    Continue current diet     Electronically signed by Jaida Fleming RD, LD on 7/15/20 at 9:54 AM EDT    Contact: [phone]

## 2020-07-15 NOTE — H&P
History & Physical    Patient:  Norman Lopez  YOB: 1940  Date of Service: 7/15/2020  MRN: [id number]   Acct:   [de-identified]   Primary Care Physician: Danial Simpson MD    Chief Complaint:   Chief Complaint   Patient presents with    Chest Pain     pt called her daughter today and states she had chest pain. when asked if pt has chest pain, she just states, \"not bad. \"       History of Present Illness: The patient is a 70 y.o. female presented to the emergency room for evaluation of chest pain. The patient has underlying dementia and overall is a poor historian. She lives with her daughter who is her primary caregiver. Apparently she told her daughter that she was having chest pain and for this reason she was brought to the emergency room for evaluation. Patient does have a history of coronary artery disease, paroxysmal atrial fibrillation. Not anticoagulated due to frequent falls. She follows up with [facility], Josiah Calderon. The patient states that the chest pain was in the left lower chest, felt like \"a pain\", did not radiate. She had no associated shortness of breath, no palpitations, no fevers or chills. The patient has a recent diagnosis of sick sinus syndrome and her rate control medications including amiodarone, beta-blocker and calcium channel blocker were discontinued. She is supposed to follow-up with her cardiologist for event recorder placement. Work-up in the emergency room revealed no fever, she was tachycardic with heart rate 139, blood pressure was 117/65, saturation 93% on room air. Sodium was 125, potassium 4.6, chloride 89, CO2 22. Renal function was normal with BUN 16 and creatinine 0.88. Calcium was 10. 2. proBNP was 1, 892, troponin less than 0.03. CBC was normal.  UA revealed 3+ leukocyte esterase, negative nitrates, 20-50 WBCs and 1+ bacteria. EKG revealed tachycardia with rate of 139, A. fib.   The patient was treated with low-dose IV Lopressor 2.5 mg, her heart rate improved. She was deemed appropriate for admission for A. fib with RVR, UTI and hyponatremia. Past Medical History:        Diagnosis Date    Anemia     Chicken pox     Chronic back pain     [facility] [address] [facility]     Diabetes mellitus ([facility] [facility]     Gastroesophageal reflux disease without esophagitis 12/7/2017    Headache     Hypertension     Measles     Mumps     Osteoarthritis     Whooping cough        Past Surgical History:        Procedure Laterality Date    CARPAL TUNNEL RELEASE Left 2005    JOINT REPLACEMENT Right 2004    right knee    JOINT REPLACEMENT Left 2005    left knee    JOINT REPLACEMENT Right 2016    right knee    JOINT REPLACEMENT Right 2001    right hip    JOINT REPLACEMENT Left 2006    left hip    TOE SURGERY Left 2006    joint removed from 2nd toe       Home Medications:   No current facility-administered medications on file prior to encounter. Current Outpatient Medications on File Prior to Encounter   Medication Sig Dispense Refill    metFORMIN (GLUCOPHAGE) 500 MG tablet Take 500 mg by mouth 2 times daily (with meals)      glimepiride (AMARYL) 1 MG tablet Take 1 mg by mouth 2 times daily      traMADol (ULTRAM) 50 MG tablet Take 50 mg by mouth every 8 hours as needed for Pain.       omeprazole (PRILOSEC) 20 MG delayed release capsule Take 20 mg by mouth 2 times daily      QUEtiapine (SEROQUEL) 100 MG tablet Take 1 tablet by mouth nightly 30 tablet 3    busPIRone (BUSPAR) 10 MG tablet Take 1 tablet by mouth 3 times daily 90 tablet 3    losartan (COZAAR) 100 MG tablet Take 1 tablet by mouth daily 30 tablet 5    oxybutynin (DITROPAN-XL) 5 MG extended release tablet Take 1 tablet by mouth nightly 30 tablet 3    QUEtiapine (SEROQUEL) 50 MG tablet Take 1 tablet by mouth every morning 30 tablet 5    DULoxetine (CYMBALTA) 30 MG extended release capsule Take 1 capsule by mouth nightly (Patient taking differently: Take 30 mg by mouth nightly 30 at night and 60 mg in morning) 30 capsule 3    QUEtiapine (SEROQUEL) 25 MG tablet Take 1 tablet by mouth nightly (Patient taking differently: Take 125 mg by mouth nightly 125 mg nightly) 60 tablet 0    diclofenac sodium 1 % GEL Apply 2 g topically 2 times daily 1 Tube 3    acetaminophen (TYLENOL) 500 MG tablet Take 1,000 mg by mouth As needed not daily      aspirin 325 MG tablet Take 325 mg by mouth      Multiple Vitamins-Minerals (THERAPEUTIC MULTIVITAMIN-MINERALS) tablet Take 1 tablet by mouth daily      blood glucose monitor strips Test Blood sugar Twice a day and as needed  Please disense to go with meter 100 strip 5    Blood Glucose Monitoring Suppl (ONE TOUCH ULTRA 2) w/Device KIT 1 kit by Does not apply route daily 1 kit 0    blood glucose monitor kit and supplies Please dispense on that is covered by her insurance tests daily  DX E11.9 1 kit 0       Allergies:  Penicillins; Sulfa antibiotics; and Aspirin    Social History:    reports that she has never smoked. She has never used smokeless tobacco. She reports that she does not drink alcohol or use drugs.     Family History:       Problem Relation Age of Onset    High Blood Pressure Mother     Parkinsonism Mother     High Blood Pressure Father     Diabetes Father     Heart Attack Father     Diabetes Sister     High Blood Pressure Sister     Arrhythmia Sister     Diabetes Brother     High Blood Pressure Brother        Review of systems:  Constitutional: no fever, no night sweats, positive for frequent falls and fatigue  Head: no headache, no head injury  Eye: no visual disturbance  Ears: no earache, no hearing difficulty, no tinnitus  Mouth/throat: no sore throat,  Lungs: no cough, no shortness of breath, no wheeze  CVS: no palpitation, positive chest pain, no shortness of breath  GI: no abdominal pain, no nausea , no vomiting, no constipation  JACQUELINE: no dysuria, frequency and urgency, no hematuria  Musculoskeletal: Positive for polyarthralgia, chronic neck pain  Endocrine: no polyuria, polydypsia, no cold or heat intolerence  Hematology: no anemia, no easy brusing or bleeding  Dermatology: no skin rash  Psychiatry: Positive for anxiety positive for depression  Neurology: no syncope, no seizures, no numbness or tingling of hands, no numbness or tingling of feet, no paresis    . Vitals:   Vitals:    07/15/20 0740   BP: 123/64   Pulse: 88   Resp: 20   Temp: 98 °F (36.7 °C)   SpO2: 95%      BMI: Body mass index is 39.12 kg/m².     Physical Exam:  General Appearance: She is awake, somewhat somnolent, not oriented to place and time, in no acute distress  Cardiovascular: Irregularly irregular rhythm, normal S1 and S2, no murmurs, rubs, clicks, or gallops, distal pulses intact  Pulmonary/Chest: clear to auscultation bilaterally- no wheezes, rales or rhonchi, normal air movement, no respiratory distress  Abdomen: soft, non-tender, non-distended, normal bowel sounds   Extremities: no cyanosis, clubbing or edema  Skin: warm and dry, no rash or erythema  Head: normocephalic and atraumatic  Eyes: pupils equal, round, and reactive to light  Neck: supple and non-tender without mass, no thyromegaly   Musculoskeletal: normal range of motion, no joint swelling, deformity or tenderness  Neurological:  normal speech, no focal findings or movement disorder noted    Review of Labs and Diagnostic Testing:    Recent Results (from the past 24 hour(s))   EKG 12 Lead    Collection Time: 07/14/20  9:07 PM   Result Value Ref Range    Ventricular Rate 139 BPM    Atrial Rate 84 BPM    QRS Duration 108 ms    Q-T Interval 332 ms    QTc Calculation (Bazett) 505 ms    R Axis 65 degrees    T Axis -83 degrees   CBC Auto Differential    Collection Time: 07/14/20  9:17 PM   Result Value Ref Range    WBC 7.9 3.5 - 11.0 k/uL    RBC 4.18 4.0 - 5.2 m/uL    Hemoglobin 13.7 12.0 - 16.0 g/dL    Hematocrit 39.1 36 - 46 %    MCV 93.6 80 - 100 fL    MCH 32.7 26 - 34 pg    MCHC 35.0 31 - 37 g/dL RDW 13.9 12.1 - 15.2 %    Platelets 903 742 - 741 k/uL    MPV NOT REPORTED 6.0 - 12.0 fL    NRBC Automated NOT REPORTED per 100 WBC    Differential Type YES     Seg Neutrophils 63 47 - 75 %    Lymphocytes 28 15 - 40 %    Monocytes 8 4 - 8 %    Eosinophils % 1 0 - 5 %    Basophils 0 0 - 2 %    Immature Granulocytes NOT REPORTED 0 %    Segs Absolute 5.00 2.5 - 7.0 k/uL    Absolute Lymph # 2.20 1.0 - 4.8 k/uL    Absolute Mono # 0.60 0.0 - 1.0 k/uL    Absolute Eos # 0.00 0.0 - 0.4 k/uL    Basophils Absolute 0.00 0.0 - 0.2 k/uL    Absolute Immature Granulocyte NOT REPORTED 0.00 - 0.30 k/uL    WBC Morphology NOT REPORTED     RBC Morphology NOT REPORTED     Platelet Estimate NOT REPORTED    Basic Metabolic Panel w/ Reflex to MG    Collection Time: 07/14/20  9:17 PM   Result Value Ref Range    Glucose 195 (H) 70 - 99 mg/dL    BUN 16 8 - 23 mg/dL    CREATININE 0.88 0.50 - 0.90 mg/dL    Bun/Cre Ratio 18 9 - 20    Calcium 10.2 8.6 - 10.4 mg/dL    Sodium 125 (L) 135 - 144 mmol/L    Potassium 4.6 3.7 - 5.3 mmol/L    Chloride 89 (L) 98 - 107 mmol/L    CO2 22 20 - 31 mmol/L    Anion Gap 14 9 - 17 mmol/L    GFR Non-African American >60 >60 mL/min    GFR African American >60 >60 mL/min    GFR Comment          GFR Staging NOT REPORTED    Troponin    Collection Time: 07/14/20  9:17 PM   Result Value Ref Range    Troponin, High Sensitivity NOT REPORTED 0 - 14 ng/L    Troponin T <0.03 <0.03 ng/mL    Troponin Interp         Brain Natriuretic Peptide    Collection Time: 07/14/20  9:17 PM   Result Value Ref Range    Pro-BNP 1,892 (H) <300 pg/mL    BNP Interpretation Pro-BNP Reference Range:    Protime-INR    Collection Time: 07/14/20  9:17 PM   Result Value Ref Range    Protime 12.6 11.5 - 14.2 sec    INR 1.0    Urinalysis, reflex to microscopic    Collection Time: 07/14/20  9:45 PM   Result Value Ref Range    Color, UA YELLOW YELLOW    Turbidity UA CLOUDY (A) CLEAR    Glucose, Ur NEGATIVE NEGATIVE    Bilirubin Urine NEGATIVE NEGATIVE Ketones, Urine TRACE (A) NEGATIVE    Specific Gravity, UA 1.015 1.005 - 1.030    Urine Hgb TRACE (A) NEGATIVE    pH, UA 8.0 5.0 - 8.0    Protein, UA 1+ (A) NEGATIVE    Urobilinogen, Urine Normal Normal    Nitrite, Urine NEGATIVE NEGATIVE    Leukocyte Esterase, Urine 3+ (A) NEGATIVE    Urinalysis Comments         Microscopic Urinalysis    Collection Time: 07/14/20  9:45 PM   Result Value Ref Range    -          WBC, UA 20 TO 50 0 /HPF    RBC, UA 2 TO 5 0 - 2 /HPF    Casts UA NOT REPORTED /LPF    Crystals, UA NOT REPORTED None /HPF    Epithelial Cells UA 0 TO 2 /HPF    Renal Epithelial, UA NOT REPORTED 0 /HPF    Bacteria, UA 1+ (A) None    Mucus, UA NOT REPORTED None    Trichomonas, UA NOT REPORTED None    Amorphous, UA NOT REPORTED None    Other Observations UA NOT REPORTED NOT REQ. Yeast, UA NOT REPORTED None   COVID-19, PCR    Collection Time: 07/14/20 10:27 PM    Specimen: Other   Result Value Ref Range    SARS-CoV-2          SARS-CoV-2, Rapid Not Detected Not Detected    Source . THROAT     SARS-CoV-2, PCR         Troponin    Collection Time: 07/14/20 11:52 PM   Result Value Ref Range    Troponin, High Sensitivity NOT REPORTED 0 - 14 ng/L    Troponin T <0.03 <0.03 ng/mL    Troponin Interp         Glucose, Whole Blood    Collection Time: 07/15/20  1:15 AM   Result Value Ref Range    POC Glucose 93 65 - 99 mg/dL   Glucose, Whole Blood    Collection Time: 07/15/20  5:44 AM   Result Value Ref Range    POC Glucose 84 65 - 99 mg/dL   Basic Metabolic Panel w/ Reflex to MG    Collection Time: 07/15/20  5:45 AM   Result Value Ref Range    Glucose 95 70 - 99 mg/dL    BUN 14 8 - 23 mg/dL    CREATININE 0.63 0.50 - 0.90 mg/dL    Bun/Cre Ratio NOT REPORTED 9 - 20    Calcium 9.8 8.6 - 10.4 mg/dL    Sodium 128 (L) 135 - 144 mmol/L    Potassium 4.2 3.7 - 5.3 mmol/L    Chloride 94 (L) 98 - 107 mmol/L    CO2 24 20 - 31 mmol/L    Anion Gap 10 9 - 17 mmol/L    GFR Non-African American >60 >60 mL/min    GFR African American >60 >60 mL/min    GFR Comment          GFR Staging NOT REPORTED    CBC auto differential    Collection Time: 07/15/20  5:45 AM   Result Value Ref Range    WBC 7.1 3.5 - 11.0 k/uL    RBC 3.72 (L) 4.0 - 5.2 m/uL    Hemoglobin 12.1 12.0 - 16.0 g/dL    Hematocrit 34.8 (L) 36 - 46 %    MCV 93.8 80 - 100 fL    MCH 32.7 26 - 34 pg    MCHC 34.8 31 - 37 g/dL    RDW 13.5 12.1 - 15.2 %    Platelets 928 142 - 976 k/uL    MPV NOT REPORTED 6.0 - 12.0 fL    NRBC Automated NOT REPORTED per 100 WBC    Differential Type YES     Seg Neutrophils 48 47 - 75 %    Lymphocytes 42 (H) 15 - 40 %    Monocytes 8 4 - 8 %    Eosinophils % 2 0 - 5 %    Basophils 0 0 - 2 %    Immature Granulocytes NOT REPORTED 0 %    Segs Absolute 3.40 2.5 - 7.0 k/uL    Absolute Lymph # 2.90 1.0 - 4.8 k/uL    Absolute Mono # 0.60 0.0 - 1.0 k/uL    Absolute Eos # 0.10 0.0 - 0.4 k/uL    Basophils Absolute 0.00 0.0 - 0.2 k/uL    Absolute Immature Granulocyte NOT REPORTED 0.00 - 0.30 k/uL    WBC Morphology NOT REPORTED     RBC Morphology NOT REPORTED     Platelet Estimate NOT REPORTED    Troponin    Collection Time: 07/15/20  5:45 AM   Result Value Ref Range    Troponin, High Sensitivity NOT REPORTED 0 - 14 ng/L    Troponin T <0.03 <0.03 ng/mL    Troponin Interp         Glucose, Whole Blood    Collection Time: 07/15/20  7:51 AM   Result Value Ref Range    POC Glucose 87 65 - 99 mg/dL       Radiology:     Xr Chest Portable    Result Date: 7/14/2020  CHEST RADIOGRAPH, SINGLE VIEW COMPARISON: 6/26/2020. FINDINGS: Stable elevation of the right hemidiaphragm without airspace consolidation. Cardiomediastinal silhouette appears mildly enlarged but stable. Severe end-stage arthritic changes in the right glenohumeral joint including moderate to advanced left glenohumeral joint arthritis. No pneumothorax. No airspace consolidation. Assessment/ Plan:    1.   Atrial fibrillation with RVR -heart rate stabilized after IV Lopressor, she is on oral Lopressor low-dose 12.5 mg twice daily, not anticoagulated due to high risk for frequent falls, on full-strength aspirin. Appreciate Dr. Rochelle Lozano help. She has no evidence of CHF or ACS. Continue monitoring on telemetry. She is to follow-up with her cardiologist  patient  2. UTI -started on IV Rocephin  3. Hyponatremia -possibly SIADH, she has no history of hyponatremia. Apparently she was on extremely low sodium diet at home. Started on IV normal saline. She had normal TSH in June of this year. Will monitor sodium levels. 4.  Chest pain -resolved. Possibly was secondary to her A. fib with RVR. Troponin level normal  5. Hypertension -blood pressure is stable, on losartan  6. Diabetes mellitus type 2, non-insulin-dependent - controlled, continue on metformin and Amaryl  7. Recent history of sick sinus syndrome -follow-up with . 8.  Generalized weakness -consult PT and OT  9. Dementia with psychosis -on Seroquel and BuSpar. CODE STATUS full     medical Necessity: Inpatient admission is appropriate for this patient secondary to the need of IV antibiotics,    Estimated length of stay: 2 days. The beneficiary may reasonably be expected to be discharged or transferred to a hospital within 96 hours after admission.     DVT prophylaxis:   [x] Lovenox   [] SCDs   [] SQ Heparin   [] Encourage ambulation, low risk for DVT, no chemical or mechanical    prophylaxis necessary      [] Already on Anticoagulation    Anticipated Disposition upon discharge:   [] Home   [x] Home with Home Health   [] [address]   [] [address]      Electronically signed by Margret Barajas MD on 7/15/2020 at 9:04 AM

## 2020-07-15 NOTE — ACP (ADVANCE CARE PLANNING)
Advance Care Planning     Advance Care Planning Activator (Inpatient)  Conversation Note      Date of ACP Conversation: 7/14/2020    Conversation Conducted with: Patient with [address]: Named in Advance Directive or Healthcare Power of  (name) Martha Santoro    ACP Activator: Courtney Paz    *When Decision Maker makes decisions on behalf of the incapacitated patient: Decision Maker is asked to consider and make decisions based on patient values, known preferences, or best interests. Health Care Decision Maker:     Current Designated Health Care Decision Maker:   Primary Decision Maker: Cindy Bourgeois - Child - [phone]  (If there is a [address] named in the [address]\" box in the ACP activity, but it is not visible above, be sure to open that field and then select the health care decision maker relationship (ie \"primary\") in the blank space to the right of the name.) Validate  this information as still accurate & up-to-date; edit [employer] field as needed.)    Note: Assess and validate information in current ACP documents, as indicated. If no Decision Maker listed above or available through scanned documents, then:    If no Authorized Decision Maker has previously been identified, then patient chooses [address]:  \"Who would you like to name as your primary health care decision-maker? \"               Name: Martha Santoro        Relationship: daughter          Phone number: [phone]  Peggi Sleight this person be reached easily? \" Yes  \"Who would you like to name as your back-up decision maker? \"   Name: NA        Relationship: NA          Phone number: NA  \"Can this person be reached easily? \" No    Note: If the relationship of these Decision-Makers to the patient does NOT follow your state's Next of Kin hierarchy, recommend that patient complete ACP document that meets state-specific requirements to allow them to act on the patient's behalf when appropriate. Care Preferences    Ventilation: \"If you were in your present state of health and suddenly became very ill and were unable to breathe on your own, what would your preference be about the use of a ventilator (breathing machine) if it were available to you? \"      Would the patient desire the use of ventilator (breathing machine)?: no    \"If your health worsens and it becomes clear that your chance of recovery is unlikely, what would your preference be about the use of a ventilator (breathing machine) if it were available to you? \"     Would the patient desire the use of ventilator (breathing machine)?: No      Resuscitation  \"CPR works best to restart the heart when there is a sudden event, like a heart attack, in someone who is otherwise healthy. Unfortunately, CPR does not typically restart the heart for people who have serious health conditions or who are very sick. \"    \"In the event your heart stopped as a result of an underlying serious health condition, would you want attempts to be made to restart your heart (answer \"yes\" for attempt to resuscitate) or would you prefer a natural death (answer \"no\" for do not attempt to resuscitate)? \" yes      NOTE: If the patient has a valid advance directive AND now provides care preference(s) that are inconsistent with that prior directive, advise the patient to consider either: creating a new advance directive that complies with state-specific requirements; or, if that is not possible, orally revoking that prior directive in accordance with state-specific requirements, which must be documented in the EHR. [] Yes   [x] No   Educated Toni / Andrew Payton regarding differences between Advance Directives and portable DNR orders.     Length of ACP Conversation in minutes:      Conversation Outcomes:  [x] ACP discussion completed  [] Existing advance directive reviewed with patient; no changes to patient's previously recorded wishes  [] New Advance Directive completed  [] Portable Do Not Rescitate prepared for Provider review and signature  [] POLST/POST/MOLST/MOST prepared for Provider review and signature      Follow-up plan:    [] Schedule follow-up conversation to continue planning  [] Referred individual to Provider for additional questions/concerns   [] Advised patient/agent/surrogate to review completed ACP document and update if needed with changes in condition, patient preferences or care setting    [x] This note routed to one or more involved healthcare providers

## 2020-07-15 NOTE — PROGRESS NOTES
Pt admitted to room 256. Pt transferred to bed with assist of 3. Pt is alert and oriented to person and place. Hard for pt to focus on admission questions. Pt is pulling at IV tubing and ID band. Pt up to bathroom with assist of 2 and back to bed. IVF infusing as ordered. Bed alarm on for pt safety. Will continue to monitor.

## 2020-07-15 NOTE — PROGRESS NOTES
Cardiology    1. Hx of PAF, with her in AF now, controlled ventricular response. 2.  No anticoagulation because of fall hx  3. SSS with bradycardia on 6-, with Amiodarone being stopped  4. Dementia unable to obtain history. 5.  Negative CST in Feb 2018  6. Chest pain on admission although not reliable story. 7.  NIDDM    She presented with \"chest pain\" although denied in ER. EKG initially showed tachycardia quite regular, which I thought was atrial flutter with RVR, however, her rate slowed and showed she was in atrial fibrillation. This morning her rate is controlled in the 80's. Vital signs good with O2 sat at 98%    She is currently on low dose of Lopressor and would not change or add any other rate lowing meds at this time. No evidence of CHF or ACS. Stable from my viewpoint.     Thanks, [place] MD

## 2020-07-15 NOTE — PROGRESS NOTES
SW met with pt and daughter to complete assessment. Pt is alert and watching a television program while dtr and SW converse and she adds in when she feels she needs to. Pt is a 70year old  female admitted for atrial fib with RVR. Pt's dtr lives with her and provides care to her. Pt has sons that also run errands for them when needed. Pt is receiving PT and OT services through [facility] and dtr would like these resumed at discharge. SW called to [facility] and notified of admission. Dtr would like to know about meals on wheels and SW will provide this info. Pt has multiple pieces of DME including rollator, FWW, commode, BSC, elevated toilet seat with hand rails, shower chair, and lift chair. Dtr provides transportation to appointments. Pt is a full code and follows with Dr Blaine Osuna as PCP. See ACP note. Dtr reports that she is her [facility] and copy requested. Dtr reports that medications are affordable and she has Good Rx card for others. Dtr plans for pt to return home with her at discharge with resumption of [facility] services. No further discharge needs or concerns noted at this time. SW will continue to follow and remain available.  Elle Vargas MSW LSW 7/15/2020

## 2020-07-15 NOTE — PROGRESS NOTES
[facility]ARY  Occupational Therapy  Evaluation  Date: 7/15/2020  Patient Name: Stu Burton        MRN: [id number]    : 1940  (70 y.o.)  Gender: female   Referring Practitioner: Dr. Matteo Chapin  Diagnosis: UTI/ A-fib  Additional Pertinent Hx: 70 y.o. female presented to the emergency room for evaluation of chest pain. The patient has underlying dementia and overall is a poor historian. She lives with her daughter who is her primary caregiver. Referred to OT to assess ability to care for self.   Past Medical History:   Diagnosis Date    Anemia     Chicken pox     Chronic back pain     D[facility]     Diabetes mellitus ([facility]     Gastroesophageal reflux disease without esophagitis 2017    Headache     Hypertension     Measles     Mumps     Osteoarthritis     Whooping cough      Past Surgical History:   Procedure Laterality Date    CARPAL TUNNEL RELEASE Left 2005    JOINT REPLACEMENT Right 2004    right knee    JOINT REPLACEMENT Left     left knee    JOINT REPLACEMENT Right 2016    right knee    JOINT REPLACEMENT Right 2001    right hip    JOINT REPLACEMENT Left 2006    left hip    TOE SURGERY Left 2006    joint removed from 2nd toe       Restrictions/Precautions: General Precautions, Fall Risk        Subjective  Subjective: Pt in bed upon arrival  Pain Level: 6(severe with sit to stand transfer)  Pain Location: Back, Neck        Social/Functional History  Lives With: Daughter  Type of Home: House  Home Layout: Two level, Performs ADL's on one level, Able to Live on Main level with bedroom/bathroom  Home Access: Ramped entrance  Bathroom Shower/Tub: Tub/Shower unit, Shower chair without back  Bathroom Toilet: Standard  Bathroom Accessibility: Accessible  Home Equipment: (pt sleeps in lift chair)  Receives Help From: Family  ADL Assistance: (sponge bathes only)  Bath: Dependent/Total  Dressing: Dependent/Total  Grooming: Dependent/Total  Toileting: Needs assistance  Homemaking Assistance: Needs assistance  Homemaking Responsibilities: No(dtr does)  Ambulation Assistance: Independent(with w.walker)  Transfer Assistance: Independent(with w.walker)  Active : No  IADL Comments: Pt has home PT/OT  Additional Comments: Patient lives with daughter who is primary caretaker  Prior Function  Receives Help From: Family  ADL Assistance: (sponge bathes only)  Bath: Dependent/Total  Dressing: Dependent/Total  Grooming: Dependent/Total  Toileting: Needs assistance  Homemaking Assistance: Needs assistance  Ambulation Assistance: Independent(with w.walker)  Transfer Assistance: Independent(with w.walker)  Additional Comments: Patient lives with daughter who is primary caretaker    Objective  ADL  Feeding: Setup, Verbal cueing  Grooming: Verbal cueing, Increased time to complete, Setup  UE Bathing: Maximum assistance  LE Bathing: Maximum assistance  UE Dressing: Maximum assistance  LE Dressing: Maximum assistance  Toileting: Maximum assistance  Additional Comments: Pt's dtr states that she does all the bathing & dressing tasks for pt   Supine to Sit: Stand by assistance       Balance  Sitting Balance: Minimal assistance  Standing Balance: Minimal assistance(+2)       Functional Mobility  Functional - Mobility Device: Rolling Walker  Activity: Other(from EOB to chair)  Assist Level: Minimal assistance(+2)  Functional Mobility Comments: Stand step transfer from EOB to chair via FWW & Min A +2         Assessment: OT evaluation completed. Pt demonstrates the above deficits & would benefit from OT intervention to address UB strengthening prior to d/c home. Per dtr & SW, pt planning to return home w/ HH at d/c.      Goals  Short term goals  Time Frame for Short term goals: 3 days (7-)  Short term goal 1: Pt to tolerate ROSS UB exercises/activity x 10 min w/o increased SOB & 2 or fewer rest breaks  Short term goal 2: Pt to tolerate sitting EOB x 10 min to engage in UB activity  Short term goal 3: Pt to tolerate static standing UB activity x 3-5 min w/o LOB & Min A    Plan  REQUIRES OT FOLLOW UP: Yes    Times per week: 3x  Times per day: Daily(1x per day)         Time In: 1318  Time Out: 1348  Timed Coded Minutes: 0  Total Treatment Time: 30    QUAN Castro,OTR/L  7/15/2020

## 2020-07-15 NOTE — ED PROVIDER NOTES
[facility]  eMERGENCY dEPARTMENT eNCOUnter          [facility]       Chief Complaint   Patient presents with    Chest Pain     pt called her daughter today and states she had chest pain. when asked if pt has chest pain, she just states, \"not bad. \"       Nurses Notes reviewed and I agree except as noted in the HPI. HISTORY OF PRESENT ILLNESS    Ulisses Jansen is a 70 y.o. female who presents who presents to the emergency room via EMS from home, patient had called her daughter today saying that she was having chest pain, though later recants having any chest pain, And again they are saying that she is having chest pain. Patient describes the pain as \"not bad\", patient does not quantify the pain. Patient's daughter was concerned as patient has cardiac history, was recently discharged from [facility] due to slow heart rate, was taken off several heart medications, per daughter is only taking Cozaar at this time. Daughter states patient does have a history of Alzheimer's dementia, however has been acting out more so than normal.  No reported vomiting or diarrhea, no reported recent falls. REVIEW OF SYSTEMS     Review of Systems   Unable to perform ROS: Dementia          PAST MEDICAL HISTORY    has a past medical history of Anemia, Chicken pox, Chronic back pain, Dementia ([address] Diabetes mellitus ([address] Gastroesophageal reflux disease without esophagitis, Headache, Hypertension, Measles, Mumps, Osteoarthritis, and Whooping cough. SURGICAL HISTORY      has a past surgical history that includes Carpal tunnel release (Left, 2005); joint replacement (Right, 2004); joint replacement (Left, 2005); joint replacement (Right, 2016); joint replacement (Right, 2001); joint replacement (Left, 2006); and Toe Surgery (Left, 2006).     CURRENT MEDICATIONS       Previous Medications    ACETAMINOPHEN (TYLENOL) 500 MG TABLET    Take 1,000 mg by mouth As needed not daily    ASPIRIN 325 MG TABLET    Take 325 mg by mouth    BLOOD GLUCOSE MONITOR KIT AND SUPPLIES    Please dispense on that is covered by her insurance tests daily  DX E11.9    BLOOD GLUCOSE MONITOR STRIPS    Test Blood sugar Twice a day and as needed  Please disense to go with meter    BLOOD GLUCOSE MONITORING SUPPL (ONE TOUCH ULTRA 2) W/DEVICE KIT    1 kit by Does not apply route daily    BUSPIRONE (BUSPAR) 10 MG TABLET    Take 1 tablet by mouth 3 times daily    DICLOFENAC SODIUM 1 % GEL    Apply 2 g topically 2 times daily    DULOXETINE (CYMBALTA) 30 MG EXTENDED RELEASE CAPSULE    Take 1 capsule by mouth nightly    GLIMEPIRIDE (AMARYL) 1 MG TABLET    Take 1 mg by mouth 2 times daily    LOSARTAN (COZAAR) 100 MG TABLET    Take 1 tablet by mouth daily    METFORMIN (GLUCOPHAGE) 500 MG TABLET    Take 500 mg by mouth 2 times daily (with meals)    MULTIPLE VITAMINS-MINERALS (THERAPEUTIC MULTIVITAMIN-MINERALS) TABLET    Take 1 tablet by mouth daily    OMEPRAZOLE (PRILOSEC) 20 MG DELAYED RELEASE CAPSULE    Take 20 mg by mouth 2 times daily    OXYBUTYNIN (DITROPAN-XL) 5 MG EXTENDED RELEASE TABLET    Take 1 tablet by mouth nightly    QUETIAPINE (SEROQUEL) 100 MG TABLET    Take 1 tablet by mouth nightly    QUETIAPINE (SEROQUEL) 25 MG TABLET    Take 1 tablet by mouth nightly    QUETIAPINE (SEROQUEL) 50 MG TABLET    Take 1 tablet by mouth every morning    TRAMADOL (ULTRAM) 50 MG TABLET    Take 50 mg by mouth every 8 hours as needed for Pain. ALLERGIES     is allergic to penicillins; sulfa antibiotics; and aspirin. FAMILY HISTORY     She indicated that her mother is . She indicated that her father is . She indicated that her sister is alive. She indicated that her brother is alive. family history includes Arrhythmia in her sister; Diabetes in her brother, father, and sister; Heart Attack in her father; High Blood Pressure in her brother, father, mother, and sister; Parkinsonism in her mother.     SOCIAL HISTORY reports that she has never smoked. She has never used smokeless tobacco. She reports that she does not drink alcohol or use drugs. PHYSICAL EXAM     INITIAL VITALS:  oral temperature is 98.4 °F (36.9 °C). Her blood pressure is 108/89 and her pulse is 89. Her respiration is 21 and oxygen saturation is 98%. Physical Exam   Constitutional: Patient is oriented to person. Patient appears well-developed and well-nourished. Patient is active   HENT:   Head: Normocephalic and atraumatic. Head is without contusion. Right Ear:  external ear normal. No drainage. Left Ear: external ear normal. No drainage. Nose: Nose normal. No nasal deformity. No epistaxis. Mouth/Throat: Mucous membranes are not dry. Eyes: EOMI. Conjunctivae, sclera, and lids are normal. Right eye exhibits no discharge. Left eye exhibits no discharge. Neck: Full passive range of motion without pain and phonation normal.  Negative JVD  Cardiovascular: Increased rate, irregular rhythm and intact distal pulses. No gross lower extremity pitting edema. Pulses: Right radial pulse  2+   Pulmonary/Chest: Effort normal. No tachypnea and no bradypnea. No wheezes, rhonchi, or rales. Abdominal: Increased BMI, soft, nontender  Musculoskeletal:   Negative acute trauma or deformity,  apparent full range of motion and normal strength all extremities appropriate to age. Neurological: Patient is alert and oriented to person. patient displays no tremor. Patient displays no seizure activity. .     Skin: Skin is warm and dry. Patient is not diaphoretic.   Psychiatric: Patient at times seems cooperative but at times is yelling out, however does respond well to nursing and her daughter who is present at bedside, known history of dementia Alzheimer's    DIFFERENTIAL DIAGNOSIS:   Dysrhythmia NOS, UTI    DIAGNOSTIC RESULTS     EKG: All EKG's are interpreted by the Emergency Department Physician who either signs or Co-signs this chart in the absence of a monitor, pulse ox, resting semi-Fowlers in bed    EKG as above, however shortly afterward noted change in cardiac monitor and rhythm strip now showing atrial fibrillation with RVR    Chest x-ray reviewed    Case was discussed with Dr. Ana Rosa Kwong, as a concerns over initial EKG versus rhythm strip, does agree this is now more likely to be atrial fibrillation, we did discuss appropriate medications and will give p.o. metoprolol 2.5 mg IV in the ER    Blood work-up reviewed, concerning for hyponatremia, noted normal troponin    Urinalysis reviewed    Discussed with patient's daughter my concerns for patient A. fib RVR need to be straight started on a rate limiting medication, noting patient's prior difficulties with these medications that caused her to go into a junctional bradycardia requiring admission to [facility], like to admit patient for the slow restarted these medications. Also discussed patient's likely urinary tract infection and hyponatremia, and again would like to admit for same reasons, Patient daughter acknowledges and agrees    Ceftriaxone IV ordered, 500 cc normal saline ordered    Case was discussed with Dr. Maria A Salazar, hospitalist, regarding patient's presentation current work-up, excepted for admission  FINAL IMPRESSION      1. Atrial fibrillation with RVR ([address]    2. Acute cystitis with hematuria    3.  Hyponatremia          DISPOSITION/PLAN   admit    PATIENT REFERRED TO:  Levar Wood MD  [address]  [phone]            DISCHARGE MEDICATIONS:  New Prescriptions    No medications on file           Summation      Patient Course:  admit    ED Medications administered this visit:    Medications   metoprolol (LOPRESSOR) injection 2.5 mg (2.5 mg Intravenous Given 7/14/20 2210)   cefTRIAXone (ROCEPHIN) 1 g in sterile water 10 mL IV syringe (1 g Intravenous New Bag 7/14/20 2231)   0.9 % sodium chloride bolus (500 mLs Intravenous New Bag 7/14/20 2232)       New Prescriptions from this visit:    New Prescriptions    No medications on file       Follow-up:  Danial Simpson MD  [facility] [address] [facility]  [phone]              Final Impression:   1. Atrial fibrillation with RVR ([address]    2. Acute cystitis with hematuria    3.  Hyponatremia               (Please note that portions of this note were completed with a voice recognition program.  Efforts were made to edit the dictations but occasionally words are mis-transcribed.)    MD Anthony Bacon MD  07/14/20 1895

## 2020-07-15 NOTE — DISCHARGE INSTR - COC
[Urine:300]    Safety Concerns: At Risk for Falls    Impairments/Disabilities:      Vision    Nutrition Therapy:  Current Nutrition Therapy:   - Oral Diet:  General    Routes of Feeding: Oral  Liquids: No Restrictions  Daily Fluid Restriction: no  Last Modified Barium Swallow with Video (Video Swallowing Test): not done    Treatments at the Time of Hospital Discharge:   Respiratory Treatments: ***  Oxygen Therapy:  is not on home oxygen therapy. Ventilator:    - No ventilator support    Rehab Therapies: {THERAPEUTIC INTERVENTION:[id number]}  Weight Bearing Status/Restrictions: No weight bearing restirctions  Other Medical Equipment (for information only, NOT a DME order):  walker and bedside commode  Other Treatments:       Patient's personal belongings (please select all that are sent with patient):  None    RN SIGNATURE:  Electronically signed by Susu Beckwith RN on 7/18/20 at 9:39 AM EDT    CASE MANAGEMENT/SOCIAL WORK SECTION    Inpatient Status Date: 7/14/2020    Readmission Risk Assessment Score:  Readmission Risk              Risk of Unplanned Readmission:        22           Discharging to Facility/ Agency   · Name: [facility]  · Address: [address] [place] [address]  · Phone: [phone]  · Fax: [phone]    Dialysis Facility (if applicable)   · Name:  · Address:  · Dialysis Schedule:  · Phone:  · Fax:    / signature: Electronically signed by ZINA Brunson on 7/15/20 at 3:49 PM EDT    PHYSICIAN SECTION    Prognosis: Fair    Condition at Discharge: Stable    Rehab Potential (if transferring to Rehab): Fair    Recommended Labs or Other Treatments After Discharge:     Physician Certification: I certify the above information and transfer of Zeke Whitney  is necessary for the continuing treatment of the diagnosis listed and that she requires [address] [facility] for less 30 days.      Update Admission H&P: No change in H&P    PHYSICIAN SIGNATURE: Electronically signed by Merlene Chau MD on 7/18/20 at 8:28 AM EDT

## 2020-07-15 NOTE — PROGRESS NOTES
Pt confused to place and person. Pt up to commode with assist of 2. Bed alarm on when patient returns to bed. Call light in reach.

## 2020-07-15 NOTE — PROGRESS NOTES
Hip Flexion: 3+/5  R Knee Flexion: 4/5  R Knee Extension: 4/5  R Ankle Dorsiflexion: 4+/5  R Ankle Plantar flexion: 4+/5  Strength LLE  Strength LLE: Exception  L Hip Flexion: 3+/5  L Knee Flexion: 4/5  L Knee Extension: 4/5  L Ankle Dorsiflexion: 4+/5  L Ankle Plantar Flexion: 4+/5             Supine to Sit: Stand by assistance  Sit to Stand: Minimal Assistance(x2)  Stand to sit: Minimal Assistance  Bed to Chair: Minimal assistance(x2)           Ambulation 1  Surface: level tile  Device: Rolling Walker  Assistance: Minimal assistance, 2 Person assistance  Gait Deviations: Slow Yaritza, Decreased step length, Shuffles  Distance: 3 feetx1  Comments: forward flexed posture  Balance  Sitting - Static: Good  Sitting - Dynamic: Good  Standing - Static: Fair, -  Standing - Dynamic: Fair, -    Assessment      Body structures, Functions, Activity limitations: Decreased functional mobility , Decreased strength, Decreased balance  Chart Reviewed: Yes  Assessment: Patient in bed on arrival, is alert but confused. Patient's daughter arrived and was able to help provide hx and prior level of function. Patient completed SLR and ankle pumps in bed. Patient transfered supine to sit with SBA. She sat bedside for MMT. She transfered sit to stand with min assist x2. Patient c/o severe back pain with sit to stand transfer and sat back down on edge of bed. After patient rested 1-2 minutes, she transfered sit to stand with min assist x2 peson and walked with w.walker 3 ft with min assist x2 to chair. Patient remained up in recliner chair with call bell and daughter at end of session.   Prognosis: Good           Plan  Times per week: daily  Times per day: (1-2 xday)  Current Treatment Recommendations: Strengthening, Balance Training, Transfer Training, Gait Training, Neuromuscular Re-education    Goals  Short term goals  Time Frame for Short term goals: 3 days  Short term goal 1: Patient to have increase strength B knee extension 4+/5 to transfer sit to stand with CGA x1 person  Short term goal 2: Patient to ambulate with w.walker 25 ftx2 with CGA x1 person    Long term goals  Time Frame for Long term goals : 1 week  Long term goal 1: Patient to ambulate with w.walker 50 ft x2 modified independent for return to prior functional level  Long term goal 2: Patient to have good dynamic standing balance for safe completion of activities at home       Time In: 13:18  Time Out: 13:48  Timed Coded Minutes:    Total Treatment Time: [address] [place], PT 7/15/2020

## 2020-07-16 LAB
ANION GAP SERPL CALCULATED.3IONS-SCNC: 10 MMOL/L (ref 9–17)
BUN BLDV-MCNC: 10 MG/DL (ref 8–23)
BUN/CREAT BLD: 16 (ref 9–20)
CALCIUM SERPL-MCNC: 9.7 MG/DL (ref 8.6–10.4)
CHLORIDE BLD-SCNC: 101 MMOL/L (ref 98–107)
CO2: 26 MMOL/L (ref 20–31)
CREAT SERPL-MCNC: 0.63 MG/DL (ref 0.5–0.9)
GFR AFRICAN AMERICAN: >60 ML/MIN
GFR NON-AFRICAN AMERICAN: >60 ML/MIN
GFR SERPL CREATININE-BSD FRML MDRD: NORMAL ML/MIN/{1.73_M2}
GFR SERPL CREATININE-BSD FRML MDRD: NORMAL ML/MIN/{1.73_M2}
GLUCOSE BLD-MCNC: 113 MG/DL (ref 65–99)
GLUCOSE BLD-MCNC: 119 MG/DL (ref 65–99)
GLUCOSE BLD-MCNC: 120 MG/DL (ref 65–99)
GLUCOSE BLD-MCNC: 87 MG/DL (ref 65–99)
GLUCOSE BLD-MCNC: 96 MG/DL (ref 70–99)
POTASSIUM SERPL-SCNC: 4.2 MMOL/L (ref 3.7–5.3)
SODIUM BLD-SCNC: 137 MMOL/L (ref 135–144)

## 2020-07-16 PROCEDURE — 97530 THERAPEUTIC ACTIVITIES: CPT

## 2020-07-16 PROCEDURE — 2580000003 HC RX 258: Performed by: INTERNAL MEDICINE

## 2020-07-16 PROCEDURE — 80048 BASIC METABOLIC PNL TOTAL CA: CPT

## 2020-07-16 PROCEDURE — 6370000000 HC RX 637 (ALT 250 FOR IP): Performed by: INTERNAL MEDICINE

## 2020-07-16 PROCEDURE — 97110 THERAPEUTIC EXERCISES: CPT

## 2020-07-16 PROCEDURE — 1200000000 HC SEMI PRIVATE

## 2020-07-16 PROCEDURE — 6360000002 HC RX W HCPCS: Performed by: INTERNAL MEDICINE

## 2020-07-16 PROCEDURE — 94761 N-INVAS EAR/PLS OXIMETRY MLT: CPT

## 2020-07-16 PROCEDURE — 82947 ASSAY GLUCOSE BLOOD QUANT: CPT

## 2020-07-16 PROCEDURE — 36415 COLL VENOUS BLD VENIPUNCTURE: CPT

## 2020-07-16 RX ORDER — DULOXETIN HYDROCHLORIDE 30 MG/1
60 CAPSULE, DELAYED RELEASE ORAL EVERY MORNING
Status: DISCONTINUED | OUTPATIENT
Start: 2020-07-16 | End: 2020-07-18 | Stop reason: HOSPADM

## 2020-07-16 RX ADMIN — CEFTRIAXONE SODIUM 1 G: 1 INJECTION, POWDER, FOR SOLUTION INTRAMUSCULAR; INTRAVENOUS at 21:12

## 2020-07-16 RX ADMIN — DULOXETINE HYDROCHLORIDE 60 MG: 30 CAPSULE, DELAYED RELEASE ORAL at 08:45

## 2020-07-16 RX ADMIN — MULTIPLE VITAMINS W/ MINERALS TAB 1 TABLET: TAB at 08:45

## 2020-07-16 RX ADMIN — METFORMIN HYDROCHLORIDE 500 MG: 500 TABLET ORAL at 08:45

## 2020-07-16 RX ADMIN — DICLOFENAC 2 G: 10 GEL TOPICAL at 21:15

## 2020-07-16 RX ADMIN — ENOXAPARIN SODIUM 40 MG: 40 INJECTION SUBCUTANEOUS at 08:45

## 2020-07-16 RX ADMIN — QUETIAPINE FUMARATE 100 MG: 100 TABLET ORAL at 21:16

## 2020-07-16 RX ADMIN — BUSPIRONE HYDROCHLORIDE 10 MG: 5 TABLET ORAL at 08:45

## 2020-07-16 RX ADMIN — BUSPIRONE HYDROCHLORIDE 10 MG: 5 TABLET ORAL at 21:15

## 2020-07-16 RX ADMIN — GLIMEPIRIDE 1 MG: 2 TABLET ORAL at 08:45

## 2020-07-16 RX ADMIN — LOSARTAN POTASSIUM 50 MG: 50 TABLET ORAL at 08:45

## 2020-07-16 RX ADMIN — GLIMEPIRIDE 1 MG: 2 TABLET ORAL at 21:14

## 2020-07-16 RX ADMIN — BUSPIRONE HYDROCHLORIDE 10 MG: 5 TABLET ORAL at 16:51

## 2020-07-16 RX ADMIN — METOPROLOL TARTRATE 12.5 MG: 25 TABLET, FILM COATED ORAL at 08:45

## 2020-07-16 RX ADMIN — METOPROLOL TARTRATE 12.5 MG: 25 TABLET, FILM COATED ORAL at 21:15

## 2020-07-16 RX ADMIN — ASPIRIN 325 MG ORAL TABLET 325 MG: 325 PILL ORAL at 08:44

## 2020-07-16 RX ADMIN — QUETIAPINE FUMARATE 25 MG: 100 TABLET ORAL at 21:14

## 2020-07-16 RX ADMIN — SODIUM CHLORIDE: 9 INJECTION, SOLUTION INTRAVENOUS at 10:37

## 2020-07-16 RX ADMIN — PANTOPRAZOLE SODIUM 40 MG: 40 TABLET, DELAYED RELEASE ORAL at 08:44

## 2020-07-16 RX ADMIN — QUETIAPINE FUMARATE 50 MG: 25 TABLET ORAL at 08:45

## 2020-07-16 RX ADMIN — OXYBUTYNIN CHLORIDE 5 MG: 5 TABLET, EXTENDED RELEASE ORAL at 21:15

## 2020-07-16 RX ADMIN — DULOXETINE HYDROCHLORIDE 30 MG: 30 CAPSULE, DELAYED RELEASE ORAL at 21:13

## 2020-07-16 RX ADMIN — METFORMIN HYDROCHLORIDE 500 MG: 500 TABLET ORAL at 16:51

## 2020-07-16 ASSESSMENT — PAIN SCALES - GENERAL
PAINLEVEL_OUTOF10: 0

## 2020-07-16 NOTE — PROGRESS NOTES
Spoke with daughter re:  discharge plans and amount of care that Patient is requiring at this time. Daughter is fearful to take Patient home with home care as they have limited family support to come and offer assistance. Discussed possible nursing home placement with her until Patient can gain strength and not require so much assistance. Daughter states that Patient was recently in the [address] for 21 days. Is in favor of facility placement for short term rehab with potential long term placement probable. Sent info to [facility] and to [facility] per daughters request.  Neither the [facility] or [facility] have availability at this time. Will wait to hear from [facility] re:  Availability and assist with placement details as appropriate. Also discussed with daughter the co-insurance responsibility and her need to begin the Medicaid process. Daughter voices understanding and agreement.     Krish. Angelika Buchanan [address] [place] [address]  7/16/2020

## 2020-07-16 NOTE — PROGRESS NOTES
Phone: Jose L  Date: 2020  Fax: [phone]      Physical Therapy    Daily Note    Patient Name: Bernardo Garcai      : 1940  [de-identified]70 y.o.)  MRN: [id number]     [x] Patient requires additional Physical Therapy    [] Anticipate Physical Therapy Discharge Soon     Assessment                             Assessment: Patient seated up in chair per nursing who is still in the room making the bed. States that patient had just been on the commode and is now up in the chair. Patient agrees to complete exercises. She completes without difficulty. She does have trouble staying awake during session. She remains in chair following after. She does question about going back to bed but therapist persuades patient to stay up for lunch. Call light in reach.      Safety Devices  Type of devices: Call light within reach, Left in chair          Time In: 1048  Time Out: 1107  Timed Coded Minutes: 19  Total Treatment Time: 19  Exercises:  See Flowsheets    Plan  Cont Per Plan Of Care    Goals  Short Term Goals  Time Frame for Short term goals: 3 days  Short term goal 1: Patient to have increase strength B knee extension 4+/5 to transfer sit to stand with CGA x1 person  Short term goal 2: Patient to ambulate with w.walker 25 ftx2 with CGA x1 person             Long Term Goals  Time Frame for Long term goals : 1 week  Long term goal 1: Patient to ambulate with w.walker 50 ft x2 modified independent for return to prior functional level  Long term goal 2: Patient to have good dynamic standing balance for safe completion of activities at home             [address] Number: PTA    Date: 2020

## 2020-07-16 NOTE — PROGRESS NOTES
Hospitalist Progress Note  7/16/2020 5:40 AM  Subjective:   Admit Date: 7/14/2020  PCP: Eusebio Lazo MD    Interval History:   The patient is somnolent and confused. Yesterday she became agitated, attempting to get out of bed. She was treated with 1 mg IV Ativan and responded well. She slept through the night. Per nursing  Report the patient is very weak requiring 2 people assist for basic activities. Patient's heart rate remains stable. Diet: DIET GENERAL;  Medications:   Scheduled Meds:   DULoxetine  60 mg Oral QAM    aspirin  325 mg Oral Daily    busPIRone  10 mg Oral TID    diclofenac sodium  2 g Topical BID    DULoxetine  30 mg Oral Nightly    glimepiride  1 mg Oral BID    losartan  50 mg Oral Daily    metFORMIN  500 mg Oral BID WC    therapeutic multivitamin-minerals  1 tablet Oral Daily    pantoprazole  40 mg Oral QAM AC    oxybutynin  5 mg Oral Nightly    QUEtiapine  100 mg Oral Nightly    QUEtiapine  25 mg Oral Nightly    QUEtiapine  50 mg Oral QAM    cefTRIAXone (ROCEPHIN) IV  1 g Intravenous Q24H    sodium chloride flush  10 mL Intravenous 2 times per day    enoxaparin  40 mg Subcutaneous Daily    metoprolol tartrate  12.5 mg Oral BID    insulin lispro  0-6 Units Subcutaneous TID WC    insulin lispro  0-3 Units Subcutaneous Nightly     Continuous Infusions:   sodium chloride 75 mL/hr at 07/15/20 2102    dextrose       PRN Medications: traMADol, sodium chloride flush, acetaminophen **OR** acetaminophen, polyethylene glycol, promethazine **OR** ondansetron, glucose, dextrose, glucagon (rDNA), dextrose    Objective:   Vitals: BP (!) 140/86   Pulse 95   Temp 97.5 °F (36.4 °C) (Oral)   Resp 18   Ht 5' 5\" (1.651 m)   Wt 234 lb (106.1 kg)   SpO2 98%   BMI 38.94 kg/m²   BMI: Body mass index is 38.94 kg/m².     CBC:   Recent Labs     07/14/20  2117 07/15/20  0545   WBC 7.9 7.1   HGB 13.7 12.1    268     BMP:    Recent Labs     07/14/20  2117 07/15/20  0545   NA 125* 128*   K 4.6 4.2   CL 89* 94*   CO2 22 24   BUN 16 14   CREATININE 0.88 0.63   GLUCOSE 195* 95       Physical Exam:      General Appearance:  somnolent,  no acute distress  Cardiovascular: Irregularly irregular rhythm, normal S1 and S2, no murmur  Pulmonary/Chest: clear to auscultation bilaterally- no wheezes, rales or rhonchi, normal air movement, no respiratory distress  Abdomen: soft, non-tender, non-distended, normal bowel sounds   Extremities: no cyanosis, clubbing or edema  Skin: warm and dry, no rash or erythema      Assessment and Plan:        1. Atrial fibrillation with RVR - heart rate is stable on oral Lopressor  12.5 mg twice daily, not anticoagulated due to high risk for frequent falls, on full-strength aspirin. Appreciate Dr. Hema Coelho help. Continue monitoring on telemetry. She is to follow-up with her cardiologist Claudette Thompson  after discharge  2. UTI -started on IV Rocephin, urine culture pending  3. Hyponatremia - possibly SIADH, she has no history of hyponatremia. Apparently she was on extremely low sodium diet at home. Continue on IV normal saline. She had normal TSH in June of this year. 4.  Chest pain -resolved. Possibly was secondary to A. fib with RVR. Troponin level normal  5. Hypertension -blood pressure is stable, on losartan  6. Diabetes mellitus type 2, non-insulin-dependent - controlled, continue on metformin and Amaryl  7. Recent history of sick sinus syndrome -follow-up with . 8.  Generalized weakness -consult PT and OT. May need ECF placement  9. Dementia with psychosis -on Cymbalta, Seroquel and BuSpar.           Patient continues to require inpatient admission related to need for IV antibiotics, possible ECF arrangement      Electronically signed by Victoria Fierro MD on 7/16/2020 at 5:40 AM    Rounding Hospitalist

## 2020-07-16 NOTE — PROGRESS NOTES
Phone: Jose L  Date: 2020  Fax: [phone]      Physical Therapy    Daily Note    Patient Name: Shawna Lobo      : 1940  [de-identified]70 y.o.)  MRN: [id number]     [x] Patient requires additional Physical Therapy    [] Anticipate Physical Therapy Discharge Soon     Assessment        Supine to Sit: Moderate assistance  Sit to Supine: Moderate assistance(x2)    Sit to Stand: Minimal Assistance(x2)  Stand to sit: Minimal Assistance(x2)  Bed to Chair: Minimal assistance(x2)             Ambulation 1  Surface: level tile  Device: Rolling Walker  Assistance: Minimal assistance, 2 Person assistance  Gait Deviations: Slow Yaritza, Decreased step length, Shuffles  Distance: 3 ft x2  Comments: forward flexed posture             Assessment: So-treat with OT this session due to safety and cognitive concerns. Patient in bed upon arrival to room. She agrees to complete exercises sitting on edge of bed. Supine to sit is mod assist.  Sitting balance is good and she does not need assist to maintain. She is able to complete seated exercises as outlined above. She then requests need to use the commode. Sit to stand from bed is min assist x 2. Utilizes wheeled walker and min assist x 2 to transfer to bedside commode. Sit to stand from  commode is again min assist x 2. Standing balance to have her bottom cleaned is mod assist as she tends to lean forward. Ambulates short distance back to bed and states she is down and would like to lie back down. Sit to supine is mod assist x 2. Call light in reach and bed alarm in place.      Safety Devices  Type of devices: Call light within reach, Bed alarm in place, Gait belt, Left in bed          Time In: 1415  Time Out: 1440  Timed Coded Minutes:12 (co-treat with OT)  Total Treatment Time: 25    Exercises:  See Flowsheets    Plan  Cont Per Plan Of Care    Goals  Short Term Goals  Time Frame for Short term goals: 3 days (20)  Short term goal 1: Patient

## 2020-07-16 NOTE — PROGRESS NOTES
[facility]  Occupational Therapy  Daily Note  Date: 2020  Patient Name: Sravanthi Vaughan        MRN: [id number]    : 1940  (70 y.o.)    Subjective: Pt in bed upon arrival    Objective  ADL  Toileting: Minimal assistance  Toilet Transfer: Minimal assistance, 2 Person assistance  Additional Comments: Pt's dtr states that she does all the bathing & dressing tasks for pt     Supine to Sit: Moderate assistance  Sit to Supine: Moderate assistance, 2 Person assistance                              Balance  Sitting Balance: Minimal assistance  Standing Balance: Minimal assistance(+2)       Sit to stand: Minimal assistance, 2 Person assistance  Stand to sit: Minimal assistance, 2 Person assistance                Assessment  Assessment: Co-tx w/ PTA this afternoon for safety during transfers as pt is requiring +2 assist. Pt supine in bed upon arrival & is agreeable to therapy. Comes EOB w/ Mod A & demo's fair sitting balance at EOB to complete ROSS UB exercises via 1# wt 1x20 each. Pt requests to use the BR & completes STS w/ Min A +2 for stand step transfer from EOB to [facility]. Requires Mod A to doff/don depends & Min A for toileting hygiene w/ Mod A for standing balance. Pt returns to EOB & declines any additional therapy at this time. Pt is mod A +2 for sit to supine & remains there w/ call light in reach and bed alarms in place.   Prognosis: Fair  Discharge Recommendations: (Home w/ HH PT/OT)  Activity Tolerance: Patient limited by fatigue  Safety Devices in place: Yes         Goals  Short Term Goals  Time Frame for Short term goals: 3 days (2020)  Short term goal 1: Pt to tolerate ROSS UB exercises/activity x 10 min w/o increased SOB & 2 or fewer rest breaks  Short term goal 2: Pt to tolerate sitting EOB x 10 min to engage in UB activity  Short term goal 3: Pt to tolerate static standing UB activity x 3-5 min w/o LOB & Min A           Time In: 1415  Time Out: 1440  Timed Coded Minutes:13  Total Treatment Time: 25 Vickey Ban Hernadez   MOT,OTR/L Date: 7/16/2020

## 2020-07-17 LAB
ANION GAP SERPL CALCULATED.3IONS-SCNC: 12 MMOL/L (ref 9–17)
BUN BLDV-MCNC: 7 MG/DL (ref 8–23)
BUN/CREAT BLD: 11 (ref 9–20)
CALCIUM SERPL-MCNC: 10.1 MG/DL (ref 8.6–10.4)
CHLORIDE BLD-SCNC: 98 MMOL/L (ref 98–107)
CO2: 27 MMOL/L (ref 20–31)
CREAT SERPL-MCNC: 0.64 MG/DL (ref 0.5–0.9)
CULTURE: ABNORMAL
GFR AFRICAN AMERICAN: >60 ML/MIN
GFR NON-AFRICAN AMERICAN: >60 ML/MIN
GFR SERPL CREATININE-BSD FRML MDRD: ABNORMAL ML/MIN/{1.73_M2}
GFR SERPL CREATININE-BSD FRML MDRD: ABNORMAL ML/MIN/{1.73_M2}
GLUCOSE BLD-MCNC: 121 MG/DL (ref 70–99)
GLUCOSE BLD-MCNC: 129 MG/DL (ref 65–99)
GLUCOSE BLD-MCNC: 137 MG/DL (ref 65–99)
GLUCOSE BLD-MCNC: 94 MG/DL (ref 65–99)
Lab: ABNORMAL
POTASSIUM SERPL-SCNC: 3.8 MMOL/L (ref 3.7–5.3)
SODIUM BLD-SCNC: 137 MMOL/L (ref 135–144)
SPECIMEN DESCRIPTION: ABNORMAL

## 2020-07-17 PROCEDURE — 6370000000 HC RX 637 (ALT 250 FOR IP): Performed by: INTERNAL MEDICINE

## 2020-07-17 PROCEDURE — 6360000002 HC RX W HCPCS: Performed by: INTERNAL MEDICINE

## 2020-07-17 PROCEDURE — 80048 BASIC METABOLIC PNL TOTAL CA: CPT

## 2020-07-17 PROCEDURE — 97530 THERAPEUTIC ACTIVITIES: CPT

## 2020-07-17 PROCEDURE — 1200000000 HC SEMI PRIVATE

## 2020-07-17 PROCEDURE — 36415 COLL VENOUS BLD VENIPUNCTURE: CPT

## 2020-07-17 PROCEDURE — 94761 N-INVAS EAR/PLS OXIMETRY MLT: CPT

## 2020-07-17 PROCEDURE — 97110 THERAPEUTIC EXERCISES: CPT

## 2020-07-17 PROCEDURE — 97535 SELF CARE MNGMENT TRAINING: CPT

## 2020-07-17 PROCEDURE — 82947 ASSAY GLUCOSE BLOOD QUANT: CPT

## 2020-07-17 RX ORDER — CIPROFLOXACIN 500 MG/1
250 TABLET, FILM COATED ORAL EVERY 12 HOURS SCHEDULED
Status: DISCONTINUED | OUTPATIENT
Start: 2020-07-17 | End: 2020-07-18 | Stop reason: HOSPADM

## 2020-07-17 RX ORDER — LORAZEPAM 2 MG/ML
1 INJECTION INTRAMUSCULAR ONCE
Status: COMPLETED | OUTPATIENT
Start: 2020-07-17 | End: 2020-07-17

## 2020-07-17 RX ADMIN — ONDANSETRON 4 MG: 2 INJECTION INTRAMUSCULAR; INTRAVENOUS at 11:47

## 2020-07-17 RX ADMIN — BUSPIRONE HYDROCHLORIDE 10 MG: 5 TABLET ORAL at 20:55

## 2020-07-17 RX ADMIN — PANTOPRAZOLE SODIUM 40 MG: 40 TABLET, DELAYED RELEASE ORAL at 06:06

## 2020-07-17 RX ADMIN — DICLOFENAC 2 G: 10 GEL TOPICAL at 20:55

## 2020-07-17 RX ADMIN — ASPIRIN 325 MG ORAL TABLET 325 MG: 325 PILL ORAL at 09:12

## 2020-07-17 RX ADMIN — DULOXETINE HYDROCHLORIDE 60 MG: 30 CAPSULE, DELAYED RELEASE ORAL at 09:12

## 2020-07-17 RX ADMIN — BUSPIRONE HYDROCHLORIDE 10 MG: 5 TABLET ORAL at 09:12

## 2020-07-17 RX ADMIN — LOSARTAN POTASSIUM 50 MG: 50 TABLET ORAL at 09:12

## 2020-07-17 RX ADMIN — METOPROLOL TARTRATE 12.5 MG: 25 TABLET, FILM COATED ORAL at 20:55

## 2020-07-17 RX ADMIN — GLIMEPIRIDE 1 MG: 2 TABLET ORAL at 20:53

## 2020-07-17 RX ADMIN — QUETIAPINE FUMARATE 50 MG: 25 TABLET ORAL at 09:12

## 2020-07-17 RX ADMIN — OXYBUTYNIN CHLORIDE 5 MG: 5 TABLET, EXTENDED RELEASE ORAL at 21:08

## 2020-07-17 RX ADMIN — ENOXAPARIN SODIUM 40 MG: 40 INJECTION SUBCUTANEOUS at 09:14

## 2020-07-17 RX ADMIN — LORAZEPAM 1 MG: 2 INJECTION, SOLUTION INTRAMUSCULAR; INTRAVENOUS at 15:44

## 2020-07-17 RX ADMIN — METFORMIN HYDROCHLORIDE 500 MG: 500 TABLET ORAL at 15:44

## 2020-07-17 RX ADMIN — DICLOFENAC 2 G: 10 GEL TOPICAL at 09:13

## 2020-07-17 RX ADMIN — QUETIAPINE FUMARATE 100 MG: 100 TABLET ORAL at 20:54

## 2020-07-17 RX ADMIN — CIPROFLOXACIN HYDROCHLORIDE 250 MG: 500 TABLET, FILM COATED ORAL at 09:12

## 2020-07-17 RX ADMIN — MULTIPLE VITAMINS W/ MINERALS TAB 1 TABLET: TAB at 09:13

## 2020-07-17 RX ADMIN — QUETIAPINE FUMARATE 25 MG: 100 TABLET ORAL at 20:56

## 2020-07-17 RX ADMIN — METFORMIN HYDROCHLORIDE 500 MG: 500 TABLET ORAL at 09:16

## 2020-07-17 RX ADMIN — METOPROLOL TARTRATE 12.5 MG: 25 TABLET, FILM COATED ORAL at 09:13

## 2020-07-17 RX ADMIN — GLIMEPIRIDE 1 MG: 2 TABLET ORAL at 09:13

## 2020-07-17 RX ADMIN — CIPROFLOXACIN HYDROCHLORIDE 250 MG: 500 TABLET, FILM COATED ORAL at 20:53

## 2020-07-17 RX ADMIN — DULOXETINE HYDROCHLORIDE 30 MG: 30 CAPSULE, DELAYED RELEASE ORAL at 20:55

## 2020-07-17 ASSESSMENT — PAIN SCALES - GENERAL
PAINLEVEL_OUTOF10: 0

## 2020-07-17 NOTE — PROGRESS NOTES
Phone: Jose L  Date: 2020  Fax: [phone]      Physical Therapy    Daily Note    Patient Name: Quinton Sahu      : 1940  [de-identified]70 y.o.)  MRN: [id number]     [x] Patient requires additional Physical Therapy    [] Anticipate Physical Therapy Discharge Soon     Assessment     Sit to Supine: Moderate assistance(x2)    Sit to Stand: Minimal Assistance(x2)  Stand to sit: Minimal Assistance(x2)  Bed to Chair: Minimal assistance(x2)             Ambulation 1  Surface: level tile  Device: Rolling Walker  Assistance: Minimal assistance, 2 Person assistance  Gait Deviations: Slow Yaritza, Decreased step length, Shuffles  Distance: 4-5 steps x 2  Comments: bedside chair to bed             Assessment: Patient in chair and has been setting her alarm off all afternoon. PTA and OT enter room and assist patient back to bed. Sit to stand is min assist x 2. She is able to take a few steps with wheeled walker and min assist x2. Requires verbal cues to not sit down before she gets to the bed. Sit to supine is min x 2. Patient is confused this afternoon and does not agree to complete exercises. Will continue to progress with gait and exercises as able. Patient in bed with call light in reach and bed alarm in place.      Safety Devices  Type of devices: Call light within reach, Bed alarm in place, Gait belt, Left in bed, Nurse notified          Time In:   Time Out:   Timed Coded Minutes: 12  Total Treatment Time: 12        Plan  Cont Per Plan Of Care    Goals  Short Term Goals  Time Frame for Short term goals: 3 days (20)  Short term goal 1: Patient to have increase strength B knee extension 4+/5 to transfer sit to stand with CGA x1 person-NOT MET  Short term goal 2: Patient to ambulate with w.walker 25 ftx2 with CGA x1 person-NOT MET             Long Term Goals  Time Frame for Long term goals : 1 week (20)  Long term goal 1: Patient to ambulate with w.walker 50 ft x2 modified independent for return to prior functional level  Long term goal 2: Patient to have good dynamic standing balance for safe completion of activities at home             [address] Number: PTA    Date: 7/17/2020

## 2020-07-17 NOTE — PROGRESS NOTES
Occupational Therapy        Assisted PTA w/ transferring pt back to bed as pt is +2 for safety. Pt remains in bed at end of transfer w/ call light in reach, bed alarms in place & RN notified.          Selam Chau, QUAN, OTR/L  7/17/2020  14:12-14:24  No charge

## 2020-07-17 NOTE — PROGRESS NOTES
[facility]  Occupational Therapy  Daily Note  Date: 2020  Patient Name: Angelina Mosqueda        MRN: [id number]    : 1940  (70 y.o.)    Subjective: Pt on BSC upon arrival    Objective  ADL  Toileting: Minimal assistance  Toilet Transfer: Minimal assistance, 2 Person assistance  Additional Comments: Pt's dtr states that she does all the bathing & dressing tasks for pt       Balance  Sitting Balance: Minimal assistance  Standing Balance: Minimal assistance(+2)       Sit to stand: Minimal assistance, 2 Person assistance  Stand to sit: Minimal assistance, 2 Person assistance       Assessment  Assessment: Co-tx w/ PTA this morning for safety during transfers as pt is requiring +2 assist. Pt on BSC upon arrival. Pt is Min A +2 for STS from commode, tolerates static standing x ~2 min to complete toilet hygiene & does so w/ Min A as pt struggles with cleanliness to buttocks. Min A for pulling up depends. Stand step transfer from [facility] to chair is Min A +2 w/ poor safety awareness as pt sits prior to making sure chair is behind her. Pt instructed in & completes ROSS UB exercises w/ 2# wt 1x20 this date w/ good tolerance. Engaged in [employer]  strengthening task via colored clothespins to maintain intrinsic strength needed for manipulating items. Pt remains seated in chair w/ call light in reach & chair alarm in place.    Prognosis: Fair  Discharge Recommendations: Subacute/Skilled Nursing Facility  Activity Tolerance: Patient limited by fatigue  Safety Devices in place: Yes         Goals  Short Term Goals  Time Frame for Short term goals: 3 days (2020)  Short term goal 1: Pt to tolerate ROSS UB exercises/activity x 10 min w/o increased SOB & 2 or fewer rest breaks-NOT MET  Short term goal 2: Pt to tolerate sitting EOB x 10 min to engage in UB activity-NOT MET  Short term goal 3: Pt to tolerate static standing UB activity x 3-5 min w/o LOB & Min A-NOT MET           Time In: 1005  Time Out: 1050  Timed Coded Minutes: 23  Total Treatment Time: [address] [address] Satyaesteban GLASS, OTR/L Date: 7/17/2020

## 2020-07-17 NOTE — PROGRESS NOTES
for Long term goals : 1 week (7/23/20)  Long term goal 1: Patient to ambulate with w.walker 50 ft x2 modified independent for return to prior functional level  Long term goal 2: Patient to have good dynamic standing balance for safe completion of activities at home             [address] Number: PTA    Date: 7/17/2020

## 2020-07-17 NOTE — PROGRESS NOTES
Telephone voicemail message left for Patient's daughter re:  Discharge planning. Will await phone call back to discuss options further.     [address], [facility]  7/17/2020

## 2020-07-17 NOTE — PROGRESS NOTES
Daughter at bedside d/t pt being increasingly agitated. Pt more agreeable with daughter present and allows RN to take blood sugar and vital signs. Allows RN to remove PIV site and apply bandage.

## 2020-07-18 VITALS
OXYGEN SATURATION: 96 % | WEIGHT: 234 LBS | SYSTOLIC BLOOD PRESSURE: 152 MMHG | RESPIRATION RATE: 16 BRPM | DIASTOLIC BLOOD PRESSURE: 88 MMHG | HEIGHT: 65 IN | TEMPERATURE: 97.5 F | BODY MASS INDEX: 38.99 KG/M2 | HEART RATE: 76 BPM

## 2020-07-18 LAB
GLUCOSE BLD-MCNC: 89 MG/DL (ref 65–99)
GLUCOSE BLD-MCNC: 90 MG/DL (ref 65–99)

## 2020-07-18 PROCEDURE — 97110 THERAPEUTIC EXERCISES: CPT

## 2020-07-18 PROCEDURE — 6360000002 HC RX W HCPCS: Performed by: INTERNAL MEDICINE

## 2020-07-18 PROCEDURE — 97116 GAIT TRAINING THERAPY: CPT

## 2020-07-18 PROCEDURE — 94761 N-INVAS EAR/PLS OXIMETRY MLT: CPT

## 2020-07-18 PROCEDURE — 82947 ASSAY GLUCOSE BLOOD QUANT: CPT

## 2020-07-18 PROCEDURE — 6370000000 HC RX 637 (ALT 250 FOR IP): Performed by: INTERNAL MEDICINE

## 2020-07-18 RX ORDER — DULOXETIN HYDROCHLORIDE 60 MG/1
60 CAPSULE, DELAYED RELEASE ORAL EVERY MORNING
Qty: 30 CAPSULE | Refills: 3 | DISCHARGE
Start: 2020-07-19

## 2020-07-18 RX ORDER — TRAMADOL HYDROCHLORIDE 50 MG/1
50 TABLET ORAL EVERY 6 HOURS PRN
Qty: 28 TABLET | Refills: 0 | Status: SHIPPED | OUTPATIENT
Start: 2020-07-18 | End: 2020-07-25

## 2020-07-18 RX ORDER — POLYETHYLENE GLYCOL 3350 17 G/17G
17 POWDER, FOR SOLUTION ORAL DAILY PRN
Qty: 527 G | Refills: 1 | DISCHARGE
Start: 2020-07-18 | End: 2020-08-17

## 2020-07-18 RX ORDER — CIPROFLOXACIN 250 MG/1
250 TABLET, FILM COATED ORAL EVERY 12 HOURS SCHEDULED
Qty: 10 TABLET | Refills: 0 | DISCHARGE
Start: 2020-07-18 | End: 2020-07-23

## 2020-07-18 RX ADMIN — QUETIAPINE FUMARATE 50 MG: 25 TABLET ORAL at 07:58

## 2020-07-18 RX ADMIN — PANTOPRAZOLE SODIUM 40 MG: 40 TABLET, DELAYED RELEASE ORAL at 05:18

## 2020-07-18 RX ADMIN — METFORMIN HYDROCHLORIDE 500 MG: 500 TABLET ORAL at 07:57

## 2020-07-18 RX ADMIN — LOSARTAN POTASSIUM 50 MG: 50 TABLET ORAL at 07:56

## 2020-07-18 RX ADMIN — ASPIRIN 325 MG ORAL TABLET 325 MG: 325 PILL ORAL at 07:58

## 2020-07-18 RX ADMIN — BUSPIRONE HYDROCHLORIDE 10 MG: 5 TABLET ORAL at 07:58

## 2020-07-18 RX ADMIN — DICLOFENAC 2 G: 10 GEL TOPICAL at 10:20

## 2020-07-18 RX ADMIN — DULOXETINE HYDROCHLORIDE 60 MG: 30 CAPSULE, DELAYED RELEASE ORAL at 07:57

## 2020-07-18 RX ADMIN — ENOXAPARIN SODIUM 40 MG: 40 INJECTION SUBCUTANEOUS at 07:56

## 2020-07-18 RX ADMIN — METOPROLOL TARTRATE 12.5 MG: 25 TABLET, FILM COATED ORAL at 07:57

## 2020-07-18 RX ADMIN — GLIMEPIRIDE 1 MG: 2 TABLET ORAL at 07:56

## 2020-07-18 RX ADMIN — MULTIPLE VITAMINS W/ MINERALS TAB 1 TABLET: TAB at 07:57

## 2020-07-18 RX ADMIN — CIPROFLOXACIN HYDROCHLORIDE 250 MG: 500 TABLET, FILM COATED ORAL at 07:57

## 2020-07-18 ASSESSMENT — PAIN SCALES - GENERAL
PAINLEVEL_OUTOF10: 0

## 2020-07-18 NOTE — PROGRESS NOTES
[address] home called, They are aware and expecting patient's arrival today. Report given to nurse Sabrina Moraes. Will let them know ETA once transport is arranged.  Electronically signed by Juan Boyce RN on 7/18/2020 at 9:56 AM

## 2020-07-18 NOTE — DISCHARGE SUMMARY
Hospitalist Discharge Summary    Patient:  Karla Buckley  YOB: 1940    MRN: [id number]   Acct: [de-identified]    Primary Care Physician: Jenni Menjivar MD    Admit date:  7/14/2020    Discharge date:  7/18/2020       Discharge Diagnoses:     1. Atrial fibrillation with rapid ventricular response, history of chronic atrial fibrillation, not on anticoagulation due to high risk for falls and history of frequent falls  2. UTI secondary to Proteus mirabilis  3. Acute mental status change presumed to be secondary to UTI  4. Hyponatremia, resolved  5. Hypertension  6. Diabetes mellitus type 2, non-insulin-dependent, controlled, hemoglobin A1c 6.0% on 7/14/2020  7. Generalized weakness  8. History of sick sinus syndrome, supposed to follow-up with her cardiologist in [facility]  9.   Dementia with psychosis    Discharge Medications:       [facility] Medication Instructions [facility]:[id number]    Printed on:07/18/20 0836   Medication Information                      acetaminophen (TYLENOL) 500 MG tablet  Take 1,000 mg by mouth As needed not daily             aspirin 325 MG tablet  Take 325 mg by mouth             busPIRone (BUSPAR) 10 MG tablet  Take 1 tablet by mouth 3 times daily             ciprofloxacin (CIPRO) 250 MG tablet  Take 1 tablet by mouth every 12 hours for 5 days             diclofenac sodium 1 % GEL  Apply 2 g topically 2 times daily             DULoxetine (CYMBALTA) 30 MG extended release capsule  Take 1 capsule by mouth nightly             DULoxetine (CYMBALTA) 60 MG extended release capsule  Take 1 capsule by mouth every morning             glimepiride (AMARYL) 1 MG tablet  Take 1 mg by mouth 2 times daily             losartan (COZAAR) 100 MG tablet  Take 1 tablet by mouth daily             metFORMIN (GLUCOPHAGE) 500 MG tablet  Take 500 mg by mouth 2 times daily (with meals)             metoprolol tartrate (LOPRESSOR) 25 MG tablet  Take 0.5 tablets by mouth 2 times who advised to start her on low-dose Lopressor 12.5 mg twice daily for rate control. She is on full-strength aspirin . Her heart rate stabilized with Lopressor. Repeat troponin levels were negative, her chest pain was believed to be secondary to A. fib with RVR and she did not have more chest pain throughout her admission. Urine culture eventually came back Proteus mirabilis and she was transitioned to oral Cipro per sensitivity report. Her sodium level improved. She developed weakness and was requiring 2 people assist for basic transfers. The situation was discussed with her daughter and she requested ECF placement for rehab before the patient can return home. The patient did have episodes of agitation mainly nighttime and was treated with IV Ativan to which she responded well. She tolerates her diet, she has no nausea, no vomiting, no diarrhea. This morning she has no complaints. I believe she is medically stable for discharge to ECF to continue working with PT and OT for generalized weakness. Her CODE STATUS is full.      Disposition: SNF    Condition: Stable    Time Spent: 34 minutes      Electronically signed by Frankie Rivers MD on 7/18/2020 at 8:36 AM  Discharging Hospitalist

## 2020-07-18 NOTE — PROGRESS NOTES
Phone: Jose L  Date: 2020  Fax: [phone]      Physical Therapy    Daily Note    Patient Name: Jeremy Sandhu      : 1940  [de-identified]70 y.o.)  MRN: [id number]     [x] Patient requires additional Physical Therapy    [] Anticipate Physical Therapy Discharge Soon     Assessment        Supine to Sit: Moderate assistance  Sit to Supine: Moderate assistance(x2)  Scooting: Stand by assistance    Sit to Stand: Minimal Assistance  Stand to sit: Minimal Assistance  Bed to Chair: Minimal assistance(x2)             Ambulation 1  Surface: level tile  Device: Rolling Walker  Assistance: Minimal assistance  Gait Deviations: Slow Yaritza, Decreased step length, Shuffles  Distance: 12 ftx 1  Comments: bedside chair to bed             Assessment: Patient in therapy room in w/c following OT. Patientcompleted therex per Doc Flow seated and standing in paralle bars, and then NuStep. She transfered sit to stand with min assist x1 and stand pivot min assist x1. Patient taken back to room in w/c, and then ambulated with w.walker and min assist 12 ft from w/c to bedside chair. Plan to continue work standing balance and tolerance.      Safety Devices  Type of devices: Call light within reach, Chair alarm in place, Gait belt, Left in chair, Nurse notified          Time In: 9:00  Time Out: 9:30  Timed Coded Minutes: 30  Total Treatment Time: 30    Exercises:  See Flowsheets    Plan  Cont Per Plan Of Care    Goals  Short Term Goals  Time Frame for Short term goals: 3 days (20)  Short term goal 1: Patient to have increase strength B knee extension 4+/5 to transfer sit to stand with CGA x1 person-NOT MET  Short term goal 2: Patient to ambulate with w.walker 25 ftx2 with CGA x1 person-NOT MET             Long Term Goals  Time Frame for Long term goals : 1 week (20)  Long term goal 1: Patient to ambulate with w.walker 50 ft x2 modified independent for return to prior functional level  Long term goal 2:

## 2020-07-18 NOTE — PROGRESS NOTES
Violet in formed of ETA at 11:30.  Electronically signed by Lucien Conner RN on 7/18/2020 at 10:50 AM

## 2020-07-18 NOTE — PROGRESS NOTES
Transport paperwork faxed, spoke with them to verify forms rec'd. .  They reported ETA for transport will be 11:30.  Electronically signed by Jcarlos Valle RN on 7/18/2020 at 10:10 AM

## 2020-07-18 NOTE — PLAN OF CARE
Problem: Falls - Risk of:  Goal: Will remain free from falls  Description: Will remain free from falls  7/16/2020 1222 by Masoud Taveras RN  Outcome: Ongoing     Problem: Pain:  Goal: Pain level will decrease  Description: Pain level will decrease  7/16/2020 1222 by Masoud Taveras RN  Outcome: Ongoing     Problem: Pain:  Goal: Control of acute pain  Description: Control of acute pain  7/16/2020 1222 by Masoud Taveras RN  Outcome: Ongoing
Problem: Falls - Risk of:  Goal: Will remain free from falls  Description: Will remain free from falls  Outcome: Met This Shift  Goal: Absence of physical injury  Description: Absence of physical injury  Outcome: Met This Shift     Problem: Skin Integrity:  Goal: Will show no infection signs and symptoms  Description: Will show no infection signs and symptoms  Outcome: Met This Shift  Goal: Absence of new skin breakdown  Description: Absence of new skin breakdown  Outcome: Met This Shift     Problem: Pain:  Goal: Pain level will decrease  Description: Pain level will decrease  Outcome: Met This Shift  Goal: Control of acute pain  Description: Control of acute pain  Outcome: Met This Shift  Goal: Control of chronic pain  Description: Control of chronic pain  Outcome: Met This Shift
Problem: Falls - Risk of:  Goal: Will remain free from falls  Description: Will remain free from falls  Outcome: Met This Shift  Goal: Absence of physical injury  Description: Absence of physical injury  Outcome: Met This Shift     Problem: Skin Integrity:  Goal: Will show no infection signs and symptoms  Description: Will show no infection signs and symptoms  Outcome: Met This Shift  Goal: Absence of new skin breakdown  Description: Absence of new skin breakdown  Outcome: Met This Shift     Problem: Pain:  Goal: Pain level will decrease  Description: Pain level will decrease  Outcome: Met This Shift  Goal: Control of acute pain  Description: Control of acute pain  Outcome: Met This Shift  Goal: Control of chronic pain  Description: Control of chronic pain  Outcome: Met This Shift
Problem: Falls - Risk of:  Goal: Will remain free from falls  Description: Will remain free from falls  Outcome: Ongoing  Goal: Absence of physical injury  Description: Absence of physical injury  Outcome: Ongoing
Transfer this Plan of Care will be followed until revision is completed.     Celi Hernadez, QUAN,OTR/L                    Date: 7/15/2020

## 2020-07-20 ENCOUNTER — CARE COORDINATION (OUTPATIENT)
Dept: CASE MANAGEMENT | Age: 80
End: 2020-07-20

## 2020-07-20 RX ORDER — ASPIRIN 325 MG
325 TABLET ORAL
Qty: 30 TABLET | OUTPATIENT
Start: 2020-07-20

## 2020-07-20 RX ORDER — LORAZEPAM 2 MG/ML
1 CONCENTRATE ORAL EVERY 8 HOURS PRN
OUTPATIENT
Start: 2020-07-20 | End: 2020-08-03

## 2020-07-20 NOTE — TELEPHONE ENCOUNTER
Patient currently is at [facility] and under  the care of [facility] physician

## 2020-07-20 NOTE — TELEPHONE ENCOUNTER
Medication pending if you approved    Health Maintenance   Topic Date Due    Annual Wellness Visit (AWV)  07/29/2020    DTaP/Tdap/Td vaccine (1 - Tdap) 06/29/2021 (Originally [birth year]18/1959)    Shingles Vaccine (1 of 2) 06/29/2021 (Originally 9/18/1992)    Flu vaccine (1) 09/01/2020    Potassium monitoring  07/17/2021    Creatinine monitoring  07/17/2021    DEXA (modify frequency per FRAX score)  Completed    Pneumococcal 65+ years Vaccine  Completed    Hepatitis A vaccine  Aged Out    Hib vaccine  Aged Out    Meningococcal (ACWY) vaccine  Aged Out             (applicable per patient's age: Cancer Screenings, Depression Screening, Fall Risk Screening, Immunizations)    Hemoglobin A1C (%)   Date Value   07/14/2020 6.0   08/08/2019 5.6   11/08/2018 6.3     LDL Calculated (mg/dL)   Date Value   08/13/2019 105     AST (U/L)   Date Value   06/26/2020 17     ALT (U/L)   Date Value   06/26/2020 13     BUN (mg/dL)   Date Value   07/17/2020 7 (L)      (goal A1C is < 7)   (goal LDL is <100) need 30-50% reduction from baseline     BP Readings from Last 3 Encounters:   07/18/20 (!) 152/88   06/29/20 124/65   06/27/20 (!) 107/47    (goal /80)      All Future Testing planned in [facility]:      Next Visit Date:  Future Appointments   Date Time Provider Brandi Arias   9/29/2020  3:00 PM Shahram Mitchell MD [facility]            Patient Active Problem List:     Essential hypertension     Gastroesophageal reflux disease without esophagitis     Type 2 diabetes mellitus without complication (HCC)     Chronic pain of both shoulders     Anxiety     Atrial fibrillation with RVR (HCC)     Nonexudative age-related macular degeneration, bilateral, early dry stage     Delirium     Encephalopathy, unspecified     Spinal stenosis of cervical region

## 2020-07-20 NOTE — CARE COORDINATION
Sadia [facility] Transitions Initial Follow Up Call    Call within 2 business days of discharge: Yes    Patient: Jeremy Sandhu Patient : 1940   MRN: [id number]  Reason for Admission:   Discharge Date: 20 RARS: Readmission Risk Score: 23      Last Discharge [facility]       Complaint Diagnosis Description Type Department Provider    20 Chest Pain Atrial fibrillation with RVR ([facility] [address] . .. ED to Hosp-Admission (Discharged) (ADMITTED) [facility] 2E MS Reymundo Rosario MD; Cam Linares . .. Facility: [facility]    Follow Up:  Contacted  [employer] [facility] [employer]. Spoke with Radhika Maharaj. She stated they do not have anyone by the name of Jeremy Sandhu. She recommended to call [address] [facility] in [facility]. Contacted [facility]). Spoke with Izzy Wyman, nurse taking care of patient. She stated Sophia See is on the skilled unit and will be receiving therapy. She stated patient has had confusion and agitation. She stated she spoke with patient's daughter already. Sophia See is still very weak. Unable to review medication list at this time. Izzy Wyman stated she was getting ready to pass medication. No discharge plans.       Future Appointments   Date Time Provider Brandi Arias   2020  3:00 PM Reymundo Rosario MD [facility] Diaan Yo RN

## 2020-08-03 RX ORDER — TRAMADOL HYDROCHLORIDE 50 MG/1
TABLET ORAL
Qty: 20 TABLET | Refills: 0 | Status: SHIPPED | OUTPATIENT
Start: 2020-08-03 | End: 2020-08-10

## 2020-08-03 NOTE — TELEPHONE ENCOUNTER
Medication pending    Health Maintenance   Topic Date Due    Annual Wellness Visit (AWV)  05/29/2019    DTaP/Tdap/Td vaccine (1 - Tdap) 06/29/2021 (Originally 9/18/1959)    Shingles Vaccine (1 of 2) 06/29/2021 (Originally 9/18/1992)    Flu vaccine (1) 09/01/2020    Potassium monitoring  07/17/2021    Creatinine monitoring  07/17/2021    DEXA (modify frequency per FRAX score)  Completed    Pneumococcal 65+ years Vaccine  Completed    Hepatitis A vaccine  Aged Out    Hib vaccine  Aged Out    Meningococcal (ACWY) vaccine  Aged Out             (applicable per patient's age: Cancer Screenings, Depression Screening, Fall Risk Screening, Immunizations)    Hemoglobin A1C (%)   Date Value   07/14/2020 6.0   08/08/2019 5.6   11/08/2018 6.3     LDL Calculated (mg/dL)   Date Value   08/13/2019 105     AST (U/L)   Date Value   06/26/2020 17     ALT (U/L)   Date Value   06/26/2020 13     BUN (mg/dL)   Date Value   07/17/2020 7 (L)      (goal A1C is < 7)   (goal LDL is <100) need 30-50% reduction from baseline     BP Readings from Last 3 Encounters:   07/18/20 (!) 152/88   06/29/20 124/65   06/27/20 (!) 107/47    (goal /80)      All Future Testing planned in [facility]:      Next Visit Date:  Future Appointments   Date Time Provider Brandi Arias   9/29/2020  3:00 PM Giovanni Reynoso MD [address] [place] [address]            Patient Active Problem List:     Essential hypertension     Gastroesophageal reflux disease without esophagitis     Type 2 diabetes mellitus without complication (HCC)     Chronic pain of both shoulders     Anxiety     Atrial fibrillation with RVR (HCC)     Nonexudative age-related macular degeneration, bilateral, early dry stage     Delirium     Encephalopathy, unspecified     Spinal stenosis of cervical region

## 2020-08-17 ENCOUNTER — CARE COORDINATION (OUTPATIENT)
Dept: CASE MANAGEMENT | Age: 80
End: 2020-08-17

## 2020-08-25 ENCOUNTER — CARE COORDINATION (OUTPATIENT)
Dept: CASE MANAGEMENT | Age: 80
End: 2020-08-25

## 2020-09-04 ENCOUNTER — CARE COORDINATION (OUTPATIENT)
Dept: CASE MANAGEMENT | Age: 80
End: 2020-09-04

## 2020-09-04 NOTE — CARE COORDINATION
Attempted to reach pt for BPCI-A follow up call. Woman answered. Line was disconnected upon CTN introduction.      Hailey Raymundo RN  Care Transition Coordinator  [phone]

## 2020-10-08 ENCOUNTER — HOSPITAL ENCOUNTER (EMERGENCY)
Age: 80
Discharge: NURSING, REHAB OR LONG-TERM CARE | End: 2020-10-09
Attending: EMERGENCY MEDICINE
Payer: MEDICARE

## 2020-10-08 ENCOUNTER — APPOINTMENT (OUTPATIENT)
Dept: GENERAL RADIOLOGY | Age: 80
End: 2020-10-08
Payer: MEDICARE

## 2020-10-08 ENCOUNTER — APPOINTMENT (OUTPATIENT)
Dept: CT IMAGING | Age: 80
End: 2020-10-08
Payer: MEDICARE

## 2020-10-08 PROCEDURE — 99283 EMERGENCY DEPT VISIT LOW MDM: CPT

## 2020-10-08 PROCEDURE — 73030 X-RAY EXAM OF SHOULDER: CPT

## 2020-10-08 PROCEDURE — 99284 EMERGENCY DEPT VISIT MOD MDM: CPT

## 2020-10-08 PROCEDURE — 70450 CT HEAD/BRAIN W/O DYE: CPT

## 2020-10-08 ASSESSMENT — PAIN SCALES - GENERAL: PAINLEVEL_OUTOF10: 10

## 2020-10-09 VITALS
TEMPERATURE: 97.4 F | WEIGHT: 227 LBS | BODY MASS INDEX: 37.77 KG/M2 | OXYGEN SATURATION: 99 % | RESPIRATION RATE: 18 BRPM | SYSTOLIC BLOOD PRESSURE: 130 MMHG | HEART RATE: 70 BPM | DIASTOLIC BLOOD PRESSURE: 105 MMHG

## 2020-10-09 NOTE — ED PROVIDER NOTES
[facility] COMPLAINT    Chief Complaint   Patient presents with   Ember HASSAN    Cayla Bullock is a 68 y.o. female who presentsto ED from nursing home. By EMS. With complaint of fall, head injury. Onset PTA. Patient is a nursing home resident. Patient tripped and fell. Patient presents with hematoma to the right forehead. Location of symptoms right forehead. Patient also complains of right shoulder injury. Patient complains of a mild to moderate headache. No loss of consciousness. Patient denies nausea vomiting. Patient denies neck pain.     PAST MEDICAL HISTORY    Past Medical History:   Diagnosis Date    Anemia     Chicken pox     Chronic back pain     D[facility]     Diabetes mellitus ([facility]     Gastroesophageal reflux disease without esophagitis 12/7/2017    Headache     Hypertension     Measles     Mumps     Osteoarthritis     Whooping cough        SURGICAL HISTORY    Past Surgical History:   Procedure Laterality Date    CARPAL TUNNEL RELEASE Left 2005    JOINT REPLACEMENT Right 2004    right knee    JOINT REPLACEMENT Left 2005    left knee    JOINT REPLACEMENT Right 2016    right knee    JOINT REPLACEMENT Right 2001    right hip    JOINT REPLACEMENT Left 2006    left hip    TOE SURGERY Left 2006    joint removed from 2nd toe       CURRENT MEDICATIONS    Current Outpatient Rx   Medication Sig Dispense Refill    DULoxetine (CYMBALTA) 60 MG extended release capsule Take 1 capsule by mouth every morning 30 capsule 3    metoprolol tartrate (LOPRESSOR) 25 MG tablet Take 0.5 tablets by mouth 2 times daily 60 tablet 3    metFORMIN (GLUCOPHAGE) 500 MG tablet Take 500 mg by mouth 2 times daily (with meals)      glimepiride (AMARYL) 1 MG tablet Take 1 mg by mouth 2 times daily      omeprazole (PRILOSEC) 20 MG delayed release capsule Take 20 mg by mouth 2 times daily      QUEtiapine (SEROQUEL) 100 MG tablet Take 1 tablet by mouth nightly 30 tablet 3    busPIRone (BUSPAR) 10 MG tablet Take 1 tablet by mouth 3 times daily 90 tablet 3    losartan (COZAAR) 100 MG tablet Take 1 tablet by mouth daily 30 tablet 5    oxybutynin (DITROPAN-XL) 5 MG extended release tablet Take 1 tablet by mouth nightly 30 tablet 3    QUEtiapine (SEROQUEL) 50 MG tablet Take 1 tablet by mouth every morning 30 tablet 5    DULoxetine (CYMBALTA) 30 MG extended release capsule Take 1 capsule by mouth nightly (Patient taking differently: Take 30 mg by mouth nightly 30 at night and 60 mg in morning) 30 capsule 3    QUEtiapine (SEROQUEL) 25 MG tablet Take 1 tablet by mouth nightly (Patient taking differently: Take 125 mg by mouth nightly 125 mg nightly) 60 tablet 0    diclofenac sodium 1 % GEL Apply 2 g topically 2 times daily 1 Tube 3    acetaminophen (TYLENOL) 500 MG tablet Take 1,000 mg by mouth As needed not daily      aspirin 325 MG tablet Take 325 mg by mouth      Multiple Vitamins-Minerals (THERAPEUTIC MULTIVITAMIN-MINERALS) tablet Take 1 tablet by mouth daily         ALLERGIES    Allergies   Allergen Reactions    Penicillins Hives    Sulfa Antibiotics Hives    Aspirin Other (See Comments)     Nose bleeds in high doses         FAMILY HISTORY    Family History   Problem Relation Age of Onset    High Blood Pressure Mother     Parkinsonism Mother     High Blood Pressure Father     Diabetes Father     Heart Attack Father     Diabetes Sister     High Blood Pressure Sister     Arrhythmia Sister     Diabetes Brother     High Blood Pressure Brother        SOCIAL HISTORY    Social History     Socioeconomic History    Marital status:      Spouse name: None    Number of children: None    Years of education: None    Highest education level: None   Occupational History    None   Social Needs    Financial resource strain: Not hard at all   [place] insecurity     Worry: Never true     Inability: Never true    Transportation needs     Medical: No     Non-medical: No Tobacco Use    Smoking status: Never Smoker    Smokeless tobacco: Never Used   Substance and Sexual Activity    Alcohol use: No    Drug use: No    Sexual activity: None   Lifestyle    Physical activity     Days per week: None     Minutes per session: None    Stress: None   Relationships    Social connections     Talks on phone: None     Gets together: None     Attends Sikhism service: None     Active member of club or organization: None     Attends meetings of clubs or organizations: None     Relationship status: None    Intimate partner violence     Fear of current or ex partner: None     Emotionally abused: None     Physically abused: None     Forced sexual activity: None   Other Topics Concern    None   Social History Narrative    None           Review of Systems:  Constitutional:  Denies fever, chills, weight loss or weakness   Eyes:  Denies photophobia or discharge   HENT: Positive for injury to the right side of the forehead. Respiratory:  Denies cough or shortness of breath   Cardiovascular:  Denies chest pain, palpitations or swelling   GI:  Denies abdominal pain, nausea, vomiting, or diarrhea   Musculoskeletal:  Denies back pain   Skin:  Denies rash   Neurologic:  Denies headache, focal weakness or sensory changes   Endocrine:  Denies polyuria or polydypsia   Lymphatic:  Denies swollen glands   Psychiatric:  Denies depression, suicidal ideation or homicidal ideation   All systems negative except as marked. PHYSICAL EXAM    VITAL SIGNS: BP (!) 140/90   Pulse 70   Temp 97.4 °F (36.3 °C) (Oral)   Resp 18   Wt 227 lb (103 kg)   SpO2 100%   BMI 37.77 kg/m²    Constitutional: Elderly female. Alert and oriented x3  HENT:  Normocephalic, Right forehead injury, forehead hematoma, Bilateral external ears normal, Oropharynx moist, No oral exudates, Nose normal. Neck- Normal range of motion, No tenderness, Supple, No stridor. Eyes:  PERRL, EOMI, Conjunctiva normal, No discharge. Respiratory:  Normal breath sounds, No respiratory distress, No wheezing, No chest tenderness. Cardiovascular:  Normal heart rate, Normal rhythm, No murmurs, No rubs, No gallops. GI:  Bowel sounds normal, Soft, No tenderness, No masses, No pulsatile masses. : External genitalia appear normal, No masses or lesions. No discharge. No CVA tenderness. Musculoskeletal:  Intact distal pulses, No edema, No tenderness, No cyanosis, No clubbing. Good range of motion in all major joints. No tenderness to palpation or major deformities noted. Back- No tenderness. Integument:  Warm, Dry, No erythema, No rash. Lymphatic:  No lymphadenopathy noted. Neurologic:  Alert & oriented x 3, Normal motor function, Normal sensory function, No focal deficits noted. Psychiatric:  Affect normal, Judgment normal, Mood normal.     EKG        RADIOLOGY    CT HEAD WO CONTRAST   Final Result   Chronic microvascular ischemic change without acute intracranial    abnormality. XR SHOULDER RIGHT (MIN 2 VIEWS)   Preliminary Result   Preliminary report only      IMPRESSION:          1. No evidence of fracture or dislocation of the right shoulder. 2. Degenerative changes are noted prominent at the glenohumeral joint. PROCEDURES        Labs  Labs Reviewed - No data to display          Summation      Patient Course: Patient with no acute findings. X-ray of the right shoulder with no fracture. Patient will be transferred back to the nursing home. Fall precautions were given. ED Medications administered this visit:  Medications - No data to display    New Prescriptions from this visit:    New Prescriptions    No medications on file       Follow-up:  No follow-up provider specified. Final Impression:   1. Injury of head, initial encounter    2. Injury of right shoulder, initial encounter    3.  Fall from bed, initial encounter               (Please note that portions of this note were completed with a voice recognition program.  Efforts were made to edit the dictations but occasionally words are mis-transcribed.)        Leo Talamantes MD  10/09/20 0011

## 2020-10-09 NOTE — PROGRESS NOTES
Harjinder from [facility] contacted about patient report. [facility] is attempting to setup transport to return her back to [place].

## 2020-11-03 ENCOUNTER — HOSPITAL ENCOUNTER (EMERGENCY)
Age: 80
Discharge: HOME | End: 2020-11-03
Attending: EMERGENCY MEDICINE
Payer: COMMERCIAL

## 2020-11-03 ENCOUNTER — APPOINTMENT (OUTPATIENT)
Dept: GENERAL RADIOLOGY | Age: 80
End: 2020-11-03
Payer: COMMERCIAL

## 2020-11-03 VITALS
DIASTOLIC BLOOD PRESSURE: 90 MMHG | OXYGEN SATURATION: 99 % | TEMPERATURE: 99.5 F | HEART RATE: 94 BPM | RESPIRATION RATE: 23 BRPM | SYSTOLIC BLOOD PRESSURE: 159 MMHG

## 2020-11-03 LAB
-: NORMAL
ABSOLUTE EOS #: 0.06 K/UL (ref 0–0.44)
ABSOLUTE IMMATURE GRANULOCYTE: 0.05 K/UL (ref 0–0.3)
ABSOLUTE LYMPH #: 2.7 K/UL (ref 1.1–3.7)
ABSOLUTE MONO #: 0.74 K/UL (ref 0.1–1.2)
AMORPHOUS: NORMAL
ANION GAP SERPL CALCULATED.3IONS-SCNC: 12 MMOL/L (ref 9–17)
BACTERIA: NORMAL
BASOPHILS # BLD: 0 % (ref 0–2)
BASOPHILS ABSOLUTE: <0.03 K/UL (ref 0–0.2)
BILIRUBIN URINE: NEGATIVE
BNP INTERPRETATION: ABNORMAL
BUN BLDV-MCNC: 15 MG/DL (ref 8–23)
BUN/CREAT BLD: 28 (ref 9–20)
CALCIUM SERPL-MCNC: 9.9 MG/DL (ref 8.6–10.4)
CASTS UA: NORMAL /LPF
CHLORIDE BLD-SCNC: 91 MMOL/L (ref 98–107)
CO2: 25 MMOL/L (ref 20–31)
COLOR: YELLOW
COMMENT UA: ABNORMAL
CREAT SERPL-MCNC: 0.53 MG/DL (ref 0.5–0.9)
CRYSTALS, UA: NORMAL /HPF
DIFFERENTIAL TYPE: ABNORMAL
EOSINOPHILS RELATIVE PERCENT: 1 % (ref 1–4)
EPITHELIAL CELLS UA: NORMAL /HPF (ref 0–25)
GFR AFRICAN AMERICAN: >60 ML/MIN
GFR NON-AFRICAN AMERICAN: >60 ML/MIN
GFR SERPL CREATININE-BSD FRML MDRD: ABNORMAL ML/MIN/{1.73_M2}
GFR SERPL CREATININE-BSD FRML MDRD: ABNORMAL ML/MIN/{1.73_M2}
GLUCOSE BLD-MCNC: 142 MG/DL (ref 70–99)
GLUCOSE URINE: ABNORMAL
HCT VFR BLD CALC: 42.1 % (ref 36.3–47.1)
HEMOGLOBIN: 14.3 G/DL (ref 11.9–15.1)
IMMATURE GRANULOCYTES: 1 %
INR BLD: 0.9
KETONES, URINE: ABNORMAL
LEUKOCYTE ESTERASE, URINE: NEGATIVE
LYMPHOCYTES # BLD: 29 % (ref 24–43)
MCH RBC QN AUTO: 31.6 PG (ref 25.2–33.5)
MCHC RBC AUTO-ENTMCNC: 34 G/DL (ref 28.4–34.8)
MCV RBC AUTO: 92.9 FL (ref 82.6–102.9)
MONOCYTES # BLD: 8 % (ref 3–12)
MUCUS: NORMAL
NITRITE, URINE: NEGATIVE
NRBC AUTOMATED: 0 PER 100 WBC
OTHER OBSERVATIONS UA: NORMAL
PDW BLD-RTO: 12.2 % (ref 11.8–14.4)
PH UA: 6.5 (ref 5–9)
PLATELET # BLD: 235 K/UL (ref 138–453)
PLATELET ESTIMATE: ABNORMAL
PMV BLD AUTO: 10.1 FL (ref 8.1–13.5)
POTASSIUM SERPL-SCNC: 4.1 MMOL/L (ref 3.7–5.3)
PRO-BNP: 1825 PG/ML
PROTEIN UA: ABNORMAL
PROTHROMBIN TIME: 12.3 SEC (ref 11.5–14.2)
RBC # BLD: 4.53 M/UL (ref 3.95–5.11)
RBC # BLD: ABNORMAL 10*6/UL
RBC UA: NORMAL /HPF (ref 0–2)
RENAL EPITHELIAL, UA: NORMAL /HPF
SEG NEUTROPHILS: 61 % (ref 36–65)
SEGMENTED NEUTROPHILS ABSOLUTE COUNT: 5.66 K/UL (ref 1.5–8.1)
SODIUM BLD-SCNC: 128 MMOL/L (ref 135–144)
SPECIFIC GRAVITY UA: 1.02 (ref 1.01–1.02)
TRICHOMONAS: NORMAL
TROPONIN INTERP: NORMAL
TROPONIN INTERP: NORMAL
TROPONIN T: NORMAL NG/ML
TROPONIN T: NORMAL NG/ML
TROPONIN, HIGH SENSITIVITY: 14 NG/L (ref 0–14)
TROPONIN, HIGH SENSITIVITY: 14 NG/L (ref 0–14)
TURBIDITY: CLEAR
URINE HGB: NEGATIVE
UROBILINOGEN, URINE: NORMAL
WBC # BLD: 9.3 K/UL (ref 3.5–11.3)
WBC # BLD: ABNORMAL 10*3/UL
WBC UA: NORMAL /HPF (ref 0–5)
YEAST: NORMAL

## 2020-11-03 PROCEDURE — 36415 COLL VENOUS BLD VENIPUNCTURE: CPT

## 2020-11-03 PROCEDURE — 80048 BASIC METABOLIC PNL TOTAL CA: CPT

## 2020-11-03 PROCEDURE — 85025 COMPLETE CBC W/AUTO DIFF WBC: CPT

## 2020-11-03 PROCEDURE — 85610 PROTHROMBIN TIME: CPT

## 2020-11-03 PROCEDURE — 6370000000 HC RX 637 (ALT 250 FOR IP): Performed by: EMERGENCY MEDICINE

## 2020-11-03 PROCEDURE — 81001 URINALYSIS AUTO W/SCOPE: CPT

## 2020-11-03 PROCEDURE — 99283 EMERGENCY DEPT VISIT LOW MDM: CPT

## 2020-11-03 PROCEDURE — 71046 X-RAY EXAM CHEST 2 VIEWS: CPT

## 2020-11-03 PROCEDURE — 93005 ELECTROCARDIOGRAM TRACING: CPT | Performed by: EMERGENCY MEDICINE

## 2020-11-03 PROCEDURE — 83880 ASSAY OF NATRIURETIC PEPTIDE: CPT

## 2020-11-03 PROCEDURE — 6360000002 HC RX W HCPCS: Performed by: EMERGENCY MEDICINE

## 2020-11-03 PROCEDURE — 84484 ASSAY OF TROPONIN QUANT: CPT

## 2020-11-03 PROCEDURE — 96374 THER/PROPH/DIAG INJ IV PUSH: CPT

## 2020-11-03 RX ORDER — METOPROLOL TARTRATE 50 MG/1
25 TABLET, FILM COATED ORAL ONCE
Status: COMPLETED | OUTPATIENT
Start: 2020-11-03 | End: 2020-11-03

## 2020-11-03 RX ORDER — FUROSEMIDE 20 MG/1
20 TABLET ORAL DAILY
Qty: 3 TABLET | Refills: 0 | Status: SHIPPED | OUTPATIENT
Start: 2020-11-03 | End: 2020-11-06

## 2020-11-03 RX ORDER — SODIUM CHLORIDE 1000 MG
1 TABLET, SOLUBLE MISCELLANEOUS ONCE
Status: COMPLETED | OUTPATIENT
Start: 2020-11-03 | End: 2020-11-03

## 2020-11-03 RX ORDER — FUROSEMIDE 10 MG/ML
20 INJECTION INTRAMUSCULAR; INTRAVENOUS ONCE
Status: COMPLETED | OUTPATIENT
Start: 2020-11-03 | End: 2020-11-03

## 2020-11-03 RX ADMIN — FUROSEMIDE 20 MG: 10 INJECTION, SOLUTION INTRAVENOUS at 20:58

## 2020-11-03 RX ADMIN — SODIUM CHLORIDE TAB 1 GM 1 G: 1 TAB at 21:07

## 2020-11-03 RX ADMIN — METOPROLOL TARTRATE 25 MG: 50 TABLET, FILM COATED ORAL at 20:18

## 2020-11-03 ASSESSMENT — ENCOUNTER SYMPTOMS
NAUSEA: 0
COLOR CHANGE: 0
COUGH: 0
ABDOMINAL PAIN: 0
SHORTNESS OF BREATH: 0
RHINORRHEA: 0
VOMITING: 0
WHEEZING: 0

## 2020-11-03 ASSESSMENT — PAIN DESCRIPTION - LOCATION: LOCATION: HEAD

## 2020-11-03 ASSESSMENT — PAIN SCALES - GENERAL: PAINLEVEL_OUTOF10: 5

## 2020-11-04 LAB
EKG ATRIAL RATE: 104 BPM
EKG Q-T INTERVAL: 348 MS
EKG QRS DURATION: 104 MS
EKG QTC CALCULATION (BAZETT): 457 MS
EKG R AXIS: 46 DEGREES
EKG T AXIS: 62 DEGREES
EKG VENTRICULAR RATE: 104 BPM

## 2020-11-04 PROCEDURE — 93010 ELECTROCARDIOGRAM REPORT: CPT | Performed by: INTERNAL MEDICINE

## 2020-11-04 NOTE — ED PROVIDER NOTES
[facility] ED  Emergency Department Encounter  EmergencyMedicine Attending     Pt Jasmyn Alicia  MRN: [id number]  [place] 1940  Date of evaluation: 11/3/20  PCP:  No primary care provider on file. CHIEF COMPLAINT       Chief Complaint   Patient presents with    Atrial Fibrillation      A fib per staff at [facility]  (Location/Symptom, Timing/Onset, Context/Setting, Quality, Duration, Modifying Factors, Severity.)      Cayla Bullock is a 68 y.o. female who presents with concern for atrial fibrillation. Patient had elevated blood pressures at the facility that she is at,  because of the elevated blood pressure they did a EKG on the patient there and they found atrial fibrillation. the staff there thought that the atrial fibrillation was a new diagnosis however based on medical record this is not a new diagnosis and in fact patient has already been following up with cardiology for atrial fibrillation. In fact last time patient was seen by cardiology for paroxysmal atrial fibrillation was August 17. She was seen by Dr. Daisha Barber. Patient otherwise has some mild headaches, 4 of 10, no radiations, no fevers or chills, no thunderclap quality, no other complaints. No chest pain shortness with difficulty breathing. No abdominal pain, no shortness of breath or difficulty breathing. Patient is breathing comfortably, speaking full sentences, no dysuria frequency or urgency, no abdominal pain. No other complaints. PAST MEDICAL / SURGICAL / SOCIAL / FAMILY HISTORY      has a past medical history of Anemia, Chicken pox, Chronic back pain, Dementia ([address] Diabetes mellitus ([address] Gastroesophageal reflux disease without esophagitis, Headache, Hypertension, Measles, Mumps, Osteoarthritis, and Whooping cough.      has a past surgical history that includes Carpal tunnel release (Left, 2005); joint replacement (Right, 2004); joint replacement (Left, 2005); joint replacement (Right, 2016); joint replacement (Right, 2001); joint replacement (Left, 2006); and Toe Surgery (Left, 2006). Social History     Socioeconomic History    Marital status:      Spouse name: Not on file    Number of children: Not on file    Years of education: Not on file    Highest education level: Not on file   Occupational History    Not on file   Social Needs    Financial resource strain: Not hard at all    Food insecurity     Worry: Never true     Inability: Never true   [employer] needs     Medical: No     Non-medical: No   Tobacco Use    Smoking status: Never Smoker    Smokeless tobacco: Never Used   Substance and Sexual Activity    Alcohol use: No    Drug use: No    Sexual activity: Not on file   Lifestyle    Physical activity     Days per week: Not on file     Minutes per session: Not on file    Stress: Not on file   Relationships    Social connections     Talks on phone: Not on file     Gets together: Not on file     Attends [employer] service: Not on file     Active member of club or organization: Not on file     Attends meetings of clubs or organizations: Not on file     Relationship status: Not on file    Intimate partner violence     Fear of current or ex partner: Not on file     Emotionally abused: Not on file     Physically abused: Not on file     Forced sexual activity: Not on file   Other Topics Concern    Not on file   Social History Narrative    Not on file       Family History   Problem Relation Age of Onset    High Blood Pressure Mother     Parkinsonism Mother     High Blood Pressure Father     Diabetes Father     Heart Attack Father     Diabetes Sister     High Blood Pressure Sister     Arrhythmia Sister     Diabetes Brother     High Blood Pressure Brother        Allergies:  Penicillins; Sulfa antibiotics; and Aspirin    Home Medications:  Prior to Admission medications    Medication Sig Start Date End Date Taking?  Authorizing Provider   furosemide (LASIX) 20 MG tablet Take 1 tablet by mouth daily for 3 days 11/3/20 11/6/20 Ivis Parsons MD   DULoxetine (CYMBALTA) 60 MG extended release capsule Take 1 capsule by mouth every morning 7/19/20   Aki Ramirez MD   metoprolol tartrate (LOPRESSOR) 25 MG tablet Take 0.5 tablets by mouth 2 times daily 7/18/20   Aki Ramirez MD   metFORMIN (GLUCOPHAGE) 500 MG tablet Take 500 mg by mouth 2 times daily (with meals)    Historical Provider, MD   glimepiride (AMARYL) 1 MG tablet Take 1 mg by mouth 2 times daily    Historical Provider, MD   omeprazole (PRILOSEC) 20 MG delayed release capsule Take 20 mg by mouth 2 times daily    Historical Provider, MD   QUEtiapine (SEROQUEL) 100 MG tablet Take 1 tablet by mouth nightly 7/10/20   Aki Ramirez MD   busPIRone (BUSPAR) 10 MG tablet Take 1 tablet by mouth 3 times daily 7/9/20 11/6/20  Aki Ramirez MD   losartan (COZAAR) 100 MG tablet Take 1 tablet by mouth daily 6/29/20   Aki Ramirez MD   oxybutynin (DITROPAN-XL) 5 MG extended release tablet Take 1 tablet by mouth nightly 6/29/20   Aki Ramirez MD   QUEtiapine (SEROQUEL) 50 MG tablet Take 1 tablet by mouth every morning 6/29/20   Aki Ramirez MD   DULoxetine (CYMBALTA) 30 MG extended release capsule Take 1 capsule by mouth nightly  Patient taking differently: Take 30 mg by mouth nightly 30 at night and 60 mg in morning 6/4/20   Nova Fry MD   QUEtiapine (SEROQUEL) 25 MG tablet Take 1 tablet by mouth nightly  Patient taking differently: Take 125 mg by mouth nightly 125 mg nightly 6/4/20   Nova Fry MD   diclofenac sodium 1 % GEL Apply 2 g topically 2 times daily 5/9/19   kAi Ramirez MD   acetaminophen (TYLENOL) 500 MG tablet Take 1,000 mg by mouth As needed not daily    Historical Provider, MD   aspirin 325 MG tablet Take 325 mg by mouth    Historical Provider, MD   Multiple Vitamins-Minerals (THERAPEUTIC MULTIVITAMIN-MINERALS) tablet Take 1 tablet by mouth daily is alert.          DIFFERENTIAL  DIAGNOSIS     PLAN (LABS / IMAGING / EKG):  Orders Placed This Encounter   Procedures    XR CHEST (2 VW)    CBC Auto Differential    Basic Metabolic Panel w/ Reflex to MG    Troponin    Brain Natriuretic Peptide    Protime-INR    Urinalysis Reflex to Culture    Troponin    Microscopic Urinalysis    EKG 12 Lead       MEDICATIONS ORDERED:  Orders Placed This Encounter   Medications    metoprolol tartrate (LOPRESSOR) tablet 25 mg    furosemide (LASIX) injection 20 mg    sodium chloride tablet 1 g    furosemide (LASIX) 20 MG tablet     Sig: Take 1 tablet by mouth daily for 3 days     Dispense:  3 tablet     Refill:  0       DDX: CHF versus A. fib with RVR versus atypical ACS versus UTI versus hypertensive urgency versus hypertensive emergency    DIAGNOSTIC RESULTS / EMERGENCY DEPARTMENT COURSE / MDM   :  Results for orders placed or performed during the hospital encounter of 11/03/20   CBC Auto Differential   Result Value Ref Range    WBC 9.3 3.5 - 11.3 k/uL    RBC 4.53 3.95 - 5.11 m/uL    Hemoglobin 14.3 11.9 - 15.1 g/dL    Hematocrit 42.1 36.3 - 47.1 %    MCV 92.9 82.6 - 102.9 fL    MCH 31.6 25.2 - 33.5 pg    MCHC 34.0 28.4 - 34.8 g/dL    RDW 12.2 11.8 - 14.4 %    Platelets 215 856 - 494 k/uL    MPV 10.1 8.1 - 13.5 fL    NRBC Automated 0.0 0.0 per 100 WBC    Differential Type NOT REPORTED     WBC Morphology NOT REPORTED     RBC Morphology NOT REPORTED     Platelet Estimate NOT REPORTED     Seg Neutrophils 61 36 - 65 %    Lymphocytes 29 24 - 43 %    Monocytes 8 3 - 12 %    Eosinophils % 1 1 - 4 %    Basophils 0 0 - 2 %    Immature Granulocytes 1 (H) 0 %    Segs Absolute 5.66 1.50 - 8.10 k/uL    Absolute Lymph # 2.70 1.10 - 3.70 k/uL    Absolute Mono # 0.74 0.10 - 1.20 k/uL    Absolute Eos # 0.06 0.00 - 0.44 k/uL    Basophils Absolute <0.03 0.00 - 0.20 k/uL    Absolute Immature Granulocyte 0.05 0.00 - 0.30 k/uL   Basic Metabolic Panel w/ Reflex to MG   Result Value Ref Range Glucose 142 (H) 70 - 99 mg/dL    BUN 15 8 - 23 mg/dL    CREATININE 0.53 0.50 - 0.90 mg/dL    Bun/Cre Ratio 28 (H) 9 - 20    Calcium 9.9 8.6 - 10.4 mg/dL    Sodium 128 (L) 135 - 144 mmol/L    Potassium 4.1 3.7 - 5.3 mmol/L    Chloride 91 (L) 98 - 107 mmol/L    CO2 25 20 - 31 mmol/L    Anion Gap 12 9 - 17 mmol/L    GFR Non-African American >60 >60 mL/min    GFR African American >60 >60 mL/min    GFR Comment          GFR Staging         Troponin   Result Value Ref Range    Troponin, High Sensitivity 14 0 - 14 ng/L    Troponin T NOT REPORTED <0.03 ng/mL    Troponin Interp NOT REPORTED    Brain Natriuretic Peptide   Result Value Ref Range    Pro-BNP 1,825 (H) <300 pg/mL    BNP Interpretation Pro-BNP Reference Range:    Protime-INR   Result Value Ref Range    Protime 12.3 11.5 - 14.2 sec    INR 0.9    Urinalysis Reflex to Culture    Specimen: Urine, clean catch   Result Value Ref Range    Color, UA YELLOW YELLOW    Turbidity UA CLEAR CLEAR    Glucose, Ur TRACE (A) NEGATIVE    Bilirubin Urine NEGATIVE NEGATIVE    Ketones, Urine TRACE (A) NEGATIVE    Specific Gravity, UA 1.025 (H) 1.010 - 1.020    Urine Hgb NEGATIVE NEGATIVE    pH, UA 6.5 5.0 - 9.0    Protein, UA TRACE (A) NEGATIVE    Urobilinogen, Urine Normal Normal    Nitrite, Urine NEGATIVE NEGATIVE    Leukocyte Esterase, Urine NEGATIVE NEGATIVE    Urinalysis Comments NOT REPORTED    Troponin   Result Value Ref Range    Troponin, High Sensitivity 14 0 - 14 ng/L    Troponin T NOT REPORTED <0.03 ng/mL    Troponin Interp NOT REPORTED    Microscopic Urinalysis   Result Value Ref Range    -          WBC, UA 0 TO 2 0 - 5 /HPF    RBC, UA 0 TO 2 0 - 2 /HPF    Casts UA NOT REPORTED /LPF    Crystals, UA NOT REPORTED None /HPF    Epithelial Cells UA 0 TO 2 0 - 25 /HPF    Renal Epithelial, UA NOT REPORTED 0 /HPF    Bacteria, UA NOT REPORTED None    Mucus, UA NOT REPORTED None    Trichomonas, UA NOT REPORTED None    Amorphous, UA NOT REPORTED None    Other Observations UA NOT extrapleural air is noted. Arthritic changes in the shoulders are noted, stable. Mild cardiomegaly. Mild central vascular congestion. Xr Shoulder Right (min 2 Views)    Result Date: 10/9/2020  EXAM: XR SHOULDER RIGHT (MIN 2 VIEWS) HISTORY: Fall. Injury R shoulder. COMPARISON: None. TECHNIQUE: 3 films were done of the right shoulder. FINDINGS: Bone density is mild to moderately decreased. Prominent degenerative changes are noted at the glenohumeral joint with joint space narrowing, sclerotic changes and osteophyte formations. Subcortical degenerative changes are noted. Heavy calcified densities are noted along the rotator cuff. Mild degenerative changes are noted at the [facility] joint. The clavicle and coracoid process are unremarkable. 1. No evidence of fracture or dislocation of the right shoulder. 2. Degenerative changes are noted prominent at the glenohumeral joint. Ct Head Wo Contrast    Result Date: 10/8/2020  EXAMINATION: CT HEAD WO CONTRAST HISTORY: Has a \"code stroke\" or \"stroke alert\" been called? ->No COMPARISON: December 21, 2019 TECHNIQUE: CT examination of the head without IV contrast. Dose reduction techniques were achieved by using automated exposure control and/or adjustment of mA and/or kV according to patient size and/or use of iterative reconstruction technique. FINDINGS: Large right frontal scalp hematoma noted. There is diffuse ventricular and sulcal prominence consistent with involutional change. Extensive diffuse hypodensity in the white matter suggests chronic microvascular ischemic changes. No evident acute/subacute focus of ischemia. Gray/white matter differentiation is otherwise preserved throughout. No hemorrhage or skull fracture. No midline shift or mass. Normal orbits. Pneumatized portions of the skull are clear. Chronic microvascular ischemic change without acute intracranial abnormality.        EKG    EKG Interpretation    Interpreted by me    Rhythm: A fib   Rate: mis-transcribed.)       Milton Moraes MD  11/03/20 1787

## 2020-11-05 ENCOUNTER — HOSPITAL ENCOUNTER (OUTPATIENT)
Age: 80
Setting detail: SPECIMEN
Discharge: HOME | End: 2020-11-05
Payer: MEDICARE

## 2020-11-05 ENCOUNTER — HOSPITAL ENCOUNTER (OUTPATIENT)
Age: 80
Setting detail: SPECIMEN
Discharge: HOME | End: 2020-11-05
Payer: COMMERCIAL

## 2020-11-05 LAB
CHOLESTEROL/HDL RATIO: 2.6
CHOLESTEROL: 135 MG/DL
HDLC SERPL-MCNC: 51 MG/DL
LDL CHOLESTEROL: 66 MG/DL (ref 0–130)
TRIGL SERPL-MCNC: 90 MG/DL
VLDLC SERPL CALC-MCNC: NORMAL MG/DL (ref 1–30)

## 2020-11-05 PROCEDURE — 87449 NOS EACH ORGANISM AG IA: CPT

## 2020-11-05 PROCEDURE — 87324 CLOSTRIDIUM AG IA: CPT

## 2020-11-05 PROCEDURE — 80061 LIPID PANEL: CPT

## 2020-11-05 PROCEDURE — 36415 COLL VENOUS BLD VENIPUNCTURE: CPT

## 2020-11-05 PROCEDURE — P9604 ONE-WAY ALLOW PRORATED TRIP: HCPCS

## 2020-11-06 ENCOUNTER — HOSPITAL ENCOUNTER (OUTPATIENT)
Age: 80
Setting detail: SPECIMEN
Discharge: HOME | End: 2020-11-06
Payer: MEDICARE

## 2020-11-06 LAB
C DIFF AG + TOXIN: NEGATIVE
SPECIMEN DESCRIPTION: NORMAL

## 2020-11-06 PROCEDURE — U0003 INFECTIOUS AGENT DETECTION BY NUCLEIC ACID (DNA OR RNA); SEVERE ACUTE RESPIRATORY SYNDROME CORONAVIRUS 2 (SARS-COV-2) (CORONAVIRUS DISEASE [COVID-19]), AMPLIFIED PROBE TECHNIQUE, MAKING USE OF HIGH THROUGHPUT TECHNOLOGIES AS DESCRIBED BY CMS-2020-01-R: HCPCS

## 2020-11-09 LAB — SARS-COV-2, NAA: NOT DETECTED

## 2020-11-10 ENCOUNTER — TELEPHONE (OUTPATIENT)
Dept: PRIMARY CARE CLINIC | Age: 80
End: 2020-11-10

## 2020-12-03 ENCOUNTER — APPOINTMENT (OUTPATIENT)
Dept: CT IMAGING | Age: 80
End: 2020-12-03
Payer: MEDICARE

## 2020-12-03 ENCOUNTER — HOSPITAL ENCOUNTER (EMERGENCY)
Age: 80
Discharge: HOME | End: 2020-12-03
Attending: FAMILY MEDICINE
Payer: MEDICARE

## 2020-12-03 ENCOUNTER — APPOINTMENT (OUTPATIENT)
Dept: GENERAL RADIOLOGY | Age: 80
End: 2020-12-03
Payer: MEDICARE

## 2020-12-03 VITALS
TEMPERATURE: 98.5 F | OXYGEN SATURATION: 96 % | RESPIRATION RATE: 30 BRPM | HEART RATE: 109 BPM | SYSTOLIC BLOOD PRESSURE: 176 MMHG | DIASTOLIC BLOOD PRESSURE: 90 MMHG

## 2020-12-03 LAB
ABSOLUTE EOS #: 0 K/UL (ref 0–0.4)
ABSOLUTE IMMATURE GRANULOCYTE: ABNORMAL K/UL (ref 0–0.3)
ABSOLUTE LYMPH #: 1.3 K/UL (ref 1–4.8)
ABSOLUTE MONO #: 0.7 K/UL (ref 0–1)
ANION GAP SERPL CALCULATED.3IONS-SCNC: 11 MMOL/L (ref 9–17)
BASOPHILS # BLD: 0 % (ref 0–2)
BASOPHILS ABSOLUTE: 0 K/UL (ref 0–0.2)
BUN BLDV-MCNC: 16 MG/DL (ref 8–23)
BUN/CREAT BLD: 27 (ref 9–20)
CALCIUM SERPL-MCNC: 9.7 MG/DL (ref 8.6–10.4)
CHLORIDE BLD-SCNC: 91 MMOL/L (ref 98–107)
CO2: 26 MMOL/L (ref 20–31)
CREAT SERPL-MCNC: 0.59 MG/DL (ref 0.5–0.9)
DIFFERENTIAL TYPE: YES
EOSINOPHILS RELATIVE PERCENT: 0 % (ref 0–5)
GFR AFRICAN AMERICAN: >60 ML/MIN
GFR NON-AFRICAN AMERICAN: >60 ML/MIN
GFR SERPL CREATININE-BSD FRML MDRD: ABNORMAL ML/MIN/{1.73_M2}
GFR SERPL CREATININE-BSD FRML MDRD: ABNORMAL ML/MIN/{1.73_M2}
GLUCOSE BLD-MCNC: 170 MG/DL (ref 70–99)
HCT VFR BLD CALC: 42.2 % (ref 36–46)
HEMOGLOBIN: 14.6 G/DL (ref 12–16)
IMMATURE GRANULOCYTES: ABNORMAL %
INR BLD: 1
LYMPHOCYTES # BLD: 16 % (ref 15–40)
MCH RBC QN AUTO: 32 PG (ref 26–34)
MCHC RBC AUTO-ENTMCNC: 34.5 G/DL (ref 31–37)
MCV RBC AUTO: 92.8 FL (ref 80–100)
MONOCYTES # BLD: 9 % (ref 4–8)
NRBC AUTOMATED: ABNORMAL PER 100 WBC
PDW BLD-RTO: 13.7 % (ref 12.1–15.2)
PLATELET # BLD: 248 K/UL (ref 140–450)
PLATELET ESTIMATE: ABNORMAL
PMV BLD AUTO: ABNORMAL FL (ref 6–12)
POTASSIUM SERPL-SCNC: 4.2 MMOL/L (ref 3.7–5.3)
PROTHROMBIN TIME: 12.5 SEC (ref 11.5–14.2)
RBC # BLD: 4.55 M/UL (ref 4–5.2)
RBC # BLD: ABNORMAL 10*6/UL
SARS-COV-2, RAPID: DETECTED
SARS-COV-2: ABNORMAL
SARS-COV-2: ABNORMAL
SEG NEUTROPHILS: 75 % (ref 47–75)
SEGMENTED NEUTROPHILS ABSOLUTE COUNT: 5.9 K/UL (ref 2.5–7)
SODIUM BLD-SCNC: 128 MMOL/L (ref 135–144)
SOURCE: ABNORMAL
TROPONIN INTERP: NORMAL
TROPONIN T: <0.03 NG/ML
TROPONIN, HIGH SENSITIVITY: NORMAL NG/L (ref 0–14)
WBC # BLD: 7.9 K/UL (ref 3.5–11)
WBC # BLD: ABNORMAL 10*3/UL

## 2020-12-03 PROCEDURE — 84484 ASSAY OF TROPONIN QUANT: CPT

## 2020-12-03 PROCEDURE — 96361 HYDRATE IV INFUSION ADD-ON: CPT

## 2020-12-03 PROCEDURE — 2500000003 HC RX 250 WO HCPCS: Performed by: FAMILY MEDICINE

## 2020-12-03 PROCEDURE — 71045 X-RAY EXAM CHEST 1 VIEW: CPT

## 2020-12-03 PROCEDURE — 36415 COLL VENOUS BLD VENIPUNCTURE: CPT

## 2020-12-03 PROCEDURE — 6370000000 HC RX 637 (ALT 250 FOR IP): Performed by: FAMILY MEDICINE

## 2020-12-03 PROCEDURE — 99285 EMERGENCY DEPT VISIT HI MDM: CPT

## 2020-12-03 PROCEDURE — U0002 COVID-19 LAB TEST NON-CDC: HCPCS

## 2020-12-03 PROCEDURE — 93005 ELECTROCARDIOGRAM TRACING: CPT | Performed by: FAMILY MEDICINE

## 2020-12-03 PROCEDURE — 96374 THER/PROPH/DIAG INJ IV PUSH: CPT

## 2020-12-03 PROCEDURE — 85610 PROTHROMBIN TIME: CPT

## 2020-12-03 PROCEDURE — 2580000003 HC RX 258: Performed by: FAMILY MEDICINE

## 2020-12-03 PROCEDURE — 80048 BASIC METABOLIC PNL TOTAL CA: CPT

## 2020-12-03 PROCEDURE — 85025 COMPLETE CBC W/AUTO DIFF WBC: CPT

## 2020-12-03 PROCEDURE — 70450 CT HEAD/BRAIN W/O DYE: CPT

## 2020-12-03 RX ORDER — DILTIAZEM HYDROCHLORIDE 5 MG/ML
20 INJECTION INTRAVENOUS ONCE
Status: COMPLETED | OUTPATIENT
Start: 2020-12-03 | End: 2020-12-03

## 2020-12-03 RX ORDER — 0.9 % SODIUM CHLORIDE 0.9 %
1000 INTRAVENOUS SOLUTION INTRAVENOUS ONCE
Status: DISCONTINUED | OUTPATIENT
Start: 2020-12-03 | End: 2020-12-03 | Stop reason: HOSPADM

## 2020-12-03 RX ORDER — METOPROLOL SUCCINATE 50 MG/1
50 TABLET, EXTENDED RELEASE ORAL DAILY
Status: DISCONTINUED | OUTPATIENT
Start: 2020-12-03 | End: 2020-12-03 | Stop reason: HOSPADM

## 2020-12-03 RX ORDER — SODIUM CHLORIDE 9 MG/ML
1000 INJECTION, SOLUTION INTRAVENOUS CONTINUOUS
Status: DISCONTINUED | OUTPATIENT
Start: 2020-12-03 | End: 2020-12-03 | Stop reason: HOSPADM

## 2020-12-03 RX ADMIN — METOPROLOL SUCCINATE 50 MG: 50 TABLET, EXTENDED RELEASE ORAL at 17:10

## 2020-12-03 RX ADMIN — SODIUM CHLORIDE 1000 ML: 9 INJECTION, SOLUTION INTRAVENOUS at 16:42

## 2020-12-03 RX ADMIN — DILTIAZEM HYDROCHLORIDE 20 MG: 5 INJECTION INTRAVENOUS at 16:43

## 2020-12-03 RX ADMIN — METOPROLOL SUCCINATE 50 MG: 50 TABLET, EXTENDED RELEASE ORAL at 18:51

## 2020-12-03 NOTE — ED PROVIDER NOTES
[facility] ED  EMERGENCY DEPARTMENT ENCOUNTER      Pt Name: Haydee Montenegro  MRN: [id number]  [place] 1940  Date of evaluation: 12/3/2020  Provider: Francis Cooper MD    CHIEF COMPLAINT       Chief Complaint   Patient presents with    Illness     Pt sent over by [facility] from [facility] for severe cough and lethargy. Not acting self today. HISTORY OF PRESENT ILLNESS   (Location/Symptom, Timing/Onset, Context/Setting, Quality, Duration, Modifying Factors, Severity)  Note limiting factors. Haydee Montenegro is a 68 y.o. female who presents to the emergency department patient speaks very little, she cannot give me any history. Apparently she was sent here due to \"bad cough and lethargy. There is currently a Covid outbreak at the [facility] she is in she has been tested but the results are not back yet    HPI    Nursing Notes were reviewed. REVIEW OF SYSTEMS    (2-9 systems for level 4, 10 or more for level 5)     Review of Systems   Constitutional:        Been asked she states that nothing is bothering her       Except as noted above the remainder of the review of systems was reviewed and negative.        PAST MEDICAL HISTORY     Past Medical History:   Diagnosis Date    Anemia     Chicken pox     Chronic back pain     Dementia ([address]     Diabetes mellitus ([address]     Gastroesophageal reflux disease without esophagitis 12/7/2017    Headache     Hypertension     Measles     Mumps     Osteoarthritis     Whooping cough          SURGICAL HISTORY       Past Surgical History:   Procedure Laterality Date    CARPAL TUNNEL RELEASE Left 2005    JOINT REPLACEMENT Right 2004    right knee    JOINT REPLACEMENT Left 2005    left knee    JOINT REPLACEMENT Right 2016    right knee    JOINT REPLACEMENT Right 2001    right hip    JOINT REPLACEMENT Left 2006    left hip    TOE SURGERY Left 2006    joint removed from 2nd toe         CURRENT MEDICATIONS       Previous Medications ACETAMINOPHEN (TYLENOL) 500 MG TABLET    Take 1,000 mg by mouth As needed not daily    ASPIRIN 325 MG TABLET    Take 325 mg by mouth    DICLOFENAC SODIUM 1 % GEL    Apply 2 g topically 2 times daily    DULOXETINE (CYMBALTA) 30 MG EXTENDED RELEASE CAPSULE    Take 1 capsule by mouth nightly    DULOXETINE (CYMBALTA) 60 MG EXTENDED RELEASE CAPSULE    Take 1 capsule by mouth every morning    FUROSEMIDE (LASIX) 20 MG TABLET    Take 1 tablet by mouth daily for 3 days    GLIMEPIRIDE (AMARYL) 1 MG TABLET    Take 1 mg by mouth 2 times daily    LOSARTAN (COZAAR) 100 MG TABLET    Take 1 tablet by mouth daily    METFORMIN (GLUCOPHAGE) 500 MG TABLET    Take 500 mg by mouth 2 times daily (with meals)    METOPROLOL TARTRATE (LOPRESSOR) 25 MG TABLET    Take 0.5 tablets by mouth 2 times daily    MULTIPLE VITAMINS-MINERALS (THERAPEUTIC MULTIVITAMIN-MINERALS) TABLET    Take 1 tablet by mouth daily    OMEPRAZOLE (PRILOSEC) 20 MG DELAYED RELEASE CAPSULE    Take 20 mg by mouth 2 times daily    OXYBUTYNIN (DITROPAN-XL) 5 MG EXTENDED RELEASE TABLET    Take 1 tablet by mouth nightly    QUETIAPINE (SEROQUEL) 100 MG TABLET    Take 1 tablet by mouth nightly    QUETIAPINE (SEROQUEL) 25 MG TABLET    Take 1 tablet by mouth nightly    QUETIAPINE (SEROQUEL) 50 MG TABLET    Take 1 tablet by mouth every morning       ALLERGIES     Penicillins; Sulfa antibiotics; and Aspirin    FAMILY HISTORY       Family History   Problem Relation Age of Onset    High Blood Pressure Mother     Parkinsonism Mother     High Blood Pressure Father     Diabetes Father     Heart Attack Father     Diabetes Sister     High Blood Pressure Sister     Arrhythmia Sister     Diabetes Brother     High Blood Pressure Brother           SOCIAL HISTORY       Social History     Socioeconomic History    Marital status:      Spouse name: None    Number of children: None    Years of education: None    Highest education level: None   Occupational History    None Social Needs    Financial resource strain: Not hard at all   Alma Johns insecurity     Worry: Never true     Inability: Never true   [employer] needs     Medical: No     Non-medical: No   Tobacco Use    Smoking status: Never Smoker    Smokeless tobacco: Never Used   Substance and Sexual Activity    Alcohol use: No    Drug use: No    Sexual activity: None   Lifestyle    Physical activity     Days per week: None     Minutes per session: None    Stress: None   Relationships    Social connections     Talks on phone: None     Gets together: None     Attends Jain service: None     Active member of club or organization: None     Attends meetings of clubs or organizations: None     Relationship status: None    Intimate partner violence     Fear of current or ex partner: None     Emotionally abused: None     Physically abused: None     Forced sexual activity: None   Other Topics Concern    None   Social History Narrative    None       SCREENINGS        [place] Coma Scale  Eye Opening: Spontaneous  Best Verbal Response: Confused  Best Motor Response: Obeys commands  Mitch Coma Scale Score: 14               PHYSICAL EXAM    (up to 7 for level 4, 8 or more for level 5)     ED Triage Vitals [12/03/20 1340]   BP Temp Temp Source Pulse Resp SpO2 Height Weight   (!) 159/90 98.5 °F (36.9 °C) Oral 109 18 95 % -- --       Physical Exam  Vitals signs reviewed. Constitutional:       Appearance: She is not ill-appearing. HENT:      Head: Atraumatic. Nose: No congestion. Mouth/Throat:      Mouth: Mucous membranes are moist.      Pharynx: No posterior oropharyngeal erythema. Eyes:      General:         Right eye: No discharge. Left eye: No discharge. Pupils: Pupils are equal, round, and reactive to light. Neck:      Musculoskeletal: Neck supple. No muscular tenderness. Cardiovascular:      Rate and Rhythm: Normal rate. Rhythm irregular. Heart sounds: Murmur present. Comments: Atrial fib  Pulmonary:      Effort: No respiratory distress. Breath sounds: Rhonchi present. Abdominal:      General: Abdomen is flat. Palpations: Abdomen is soft. Tenderness: There is no abdominal tenderness. Musculoskeletal:         General: No tenderness or signs of injury. Skin:     General: Skin is warm. Capillary Refill: Capillary refill takes less than 2 seconds. Findings: No lesion or rash. Neurological:      General: No focal deficit present. Mental Status: She is alert and oriented to person, place, and time. Cranial Nerves: No cranial nerve deficit. Motor: No weakness. Psychiatric:         Mood and Affect: Mood normal.         Behavior: Behavior normal.         DIAGNOSTIC RESULTS     EKG: All EKG's are interpreted by the Emergency Department Physician who either signs or Co-signs this chart in the absence of a cardiologist.  Of lead electrocardiogram shows atrial fibrillation with rapid ventricular response rate is 124 bpm normal axis normal intervals no acute ST segment changes 9 specific T wave abnormalities      RADIOLOGY:   Non-plain film images such as CT, Ultrasound and MRI are read by the radiologist. Plain radiographic images are visualized and preliminarily interpreted by the emergency physician with the below findings:        Interpretation per the Radiologist below, if available at the time of this note:    CT Head WO Contrast   Final Result      No acute intracranial process. Stable age-related changes in the brain. Sinus disease. If clinical concern remains, consider further imaging with MRI. XR CHEST PORTABLE   Final Result      Grossly negative for an acute cardiopulmonary process.                       ED BEDSIDE ULTRASOUND:   Performed by ED Physician - none    LABS:  Labs Reviewed   CBC WITH AUTO DIFFERENTIAL - Abnormal; Notable for the following components:       Result Value    Monocytes 9 (*)     All other components within normal limits   BASIC METABOLIC PANEL W/ REFLEX TO MG FOR LOW K - Abnormal; Notable for the following components:    Glucose 170 (*)     Bun/Cre Ratio 27 (*)     Sodium 128 (*)     Chloride 91 (*)     All other components within normal limits   COVID-19 - Abnormal; Notable for the following components:    SARS-CoV-2, Rapid DETECTED (*)     All other components within normal limits   PROTIME-INR   TROPONIN   URINE RT REFLEX TO CULTURE       All other labs were within normal range or not returned as of this dictation. EMERGENCY DEPARTMENT COURSE and DIFFERENTIAL DIAGNOSIS/MDM:   Vitals:    Vitals:    12/03/20 1517 12/03/20 1602 12/03/20 1646 12/03/20 1702   BP: 138/69 139/79 132/63 (!) 151/63   Pulse: 123 121 118 106   Resp: 29 (!) 36 28 26   Temp:       TempSrc:       SpO2: 98%  97% 98%           MDM  Number of Diagnoses or Management Options  Atrial fibrillation with rapid ventricular response ([facility] [address]):   COVID-19:   Diagnosis management comments: Spoke with the daughter extensively she is the power of . She made her mom a comfort care only. She did not want her admitted or transferred. She is Covid positive. She is stable from a respiratory standpoint. She did have A. fib with RVR that was controlled with Cardizem and oral metoprolol. She was previously on 25 of metoprolol tartrate once daily, apparently for rate control and I change that to metoprolol succinate 100 mg daily she had a hyponatremia at 128 she was clinically dehydrated and was given a liter of normal saline. REASSESSMENT          CRITICAL CARE TIME       CONSULTS:  None    PROCEDURES:  Unless otherwise noted below, none     Procedures      FINAL IMPRESSION      1. Atrial fibrillation with rapid ventricular response (HCC) Controlled   2. COVID-19    3. Hyponatremia    4.  Dehydration          DISPOSITION/PLAN   DISPOSITION Decision To Discharge 12/03/2020 08:09:25 PM      PATIENT REFERRED TO:  No follow-up provider specified. DISCHARGE MEDICATIONS:  New Prescriptions    No medications on file     Controlled Substances Monitoring:     RX Monitoring 7/18/2020   Attestation -   Periodic Controlled Substance Monitoring No signs of potential drug abuse or diversion identified.        (Please note that portions of this note were completed with a voice recognition program.  Efforts were made to edit the dictations but occasionally words are mis-transcribed.)    Kamla Whitney MD (electronically signed)  Attending Emergency Physician    \      Kamla Whitney MD  12/03/20 2011

## 2020-12-04 LAB
EKG ATRIAL RATE: 129 BPM
EKG Q-T INTERVAL: 302 MS
EKG QRS DURATION: 94 MS
EKG QTC CALCULATION (BAZETT): 433 MS
EKG R AXIS: 28 DEGREES
EKG T AXIS: 81 DEGREES
EKG VENTRICULAR RATE: 124 BPM

## 2020-12-04 PROCEDURE — 93010 ELECTROCARDIOGRAM REPORT: CPT | Performed by: INTERNAL MEDICINE

## 2020-12-04 NOTE — ED NOTES
Called report to Dian, nurse, at [facility]. Explained that Susu Hussein, guardian of patient, wants [facility] to arrange a hospice consult tomorrow asap.      Tarik Rosenthal RN  12/03/20 7239

## 2020-12-04 NOTE — ED NOTES
Pts Guardian, daughter, Lindsey Rojas, arrives with paperwork showing guardianship of this patient. Lindsey Rojas stated that patient is to be a [facility] and proper paperwork was not sent from [facility]. Lindsey Rojas signed a new [facility] and was given the original copy and I then sent 5 copies to [facility]. Lindsey Rojas stated that her and the patient both had agreed that they do not want any heroic type treatment. Physician spoke at length with Lindsey Rojas and she was happy with all answers provided.       Jamshid Morgan RN  12/03/20 1354

## 2021-02-19 ENCOUNTER — HOSPITAL ENCOUNTER (OUTPATIENT)
Dept: VASCULAR LAB | Age: 81
Discharge: HOME | End: 2021-02-21
Payer: MEDICARE

## 2021-02-19 DIAGNOSIS — I73.9 PVD (PERIPHERAL VASCULAR DISEASE) (HCC): ICD-10-CM

## 2021-02-19 PROCEDURE — 93923 UPR/LXTR ART STDY 3+ LVLS: CPT

## 2021-03-18 ENCOUNTER — HOSPITAL ENCOUNTER (OUTPATIENT)
Dept: VASCULAR LAB | Age: 81
Discharge: HOME | End: 2021-03-20
Payer: MEDICARE

## 2021-03-18 ENCOUNTER — HOSPITAL ENCOUNTER (OUTPATIENT)
Dept: CT IMAGING | Age: 81
Discharge: HOME | End: 2021-03-20
Payer: MEDICARE

## 2021-03-18 DIAGNOSIS — I73.9 PERIPHERAL VASCULAR DISEASE, UNSPECIFIED (HCC): ICD-10-CM

## 2021-03-18 DIAGNOSIS — I70.25 ATHEROSCLEROSIS OF NATIVE ARTERIES OF OTHER EXTREMITIES WITH ULCERATION (HCC): ICD-10-CM

## 2021-03-18 DIAGNOSIS — I70.25 ATHEROSCLEROSIS OF NATIVE ARTERIES OF OTHER EXTREMITIES WITH ULCERATION (HCC): Primary | ICD-10-CM

## 2021-03-18 LAB
BUN BLDV-MCNC: 22 MG/DL (ref 8–23)
CREAT SERPL-MCNC: 0.49 MG/DL (ref 0.5–0.9)
GFR AFRICAN AMERICAN: >60 ML/MIN
GFR NON-AFRICAN AMERICAN: >60 ML/MIN
GFR SERPL CREATININE-BSD FRML MDRD: ABNORMAL ML/MIN/{1.73_M2}
GFR SERPL CREATININE-BSD FRML MDRD: ABNORMAL ML/MIN/{1.73_M2}

## 2021-03-18 PROCEDURE — 84520 ASSAY OF UREA NITROGEN: CPT

## 2021-03-18 PROCEDURE — 36415 COLL VENOUS BLD VENIPUNCTURE: CPT

## 2021-03-18 PROCEDURE — 82565 ASSAY OF CREATININE: CPT

## 2021-04-15 ENCOUNTER — HOSPITAL ENCOUNTER (OUTPATIENT)
Dept: CT IMAGING | Age: 81
Discharge: HOME | End: 2021-04-17
Payer: MEDICARE

## 2021-04-15 ENCOUNTER — HOSPITAL ENCOUNTER (OUTPATIENT)
Dept: VASCULAR LAB | Age: 81
Discharge: HOME | End: 2021-04-17
Payer: MEDICARE

## 2021-04-15 DIAGNOSIS — I70.25 ATHEROSCLEROSIS OF NATIVE ARTERIES OF OTHER EXTREMITIES WITH ULCERATION (HCC): ICD-10-CM

## 2021-04-15 DIAGNOSIS — I73.9 PERIPHERAL VASCULAR DISEASE, UNSPECIFIED (HCC): ICD-10-CM

## 2021-04-15 PROCEDURE — 6360000004 HC RX CONTRAST MEDICATION: Performed by: INTERNAL MEDICINE

## 2021-04-15 PROCEDURE — 76376 3D RENDER W/INTRP POSTPROCES: CPT

## 2021-04-15 PROCEDURE — 93923 UPR/LXTR ART STDY 3+ LVLS: CPT

## 2021-04-15 PROCEDURE — 75635 CT ANGIO ABDOMINAL ARTERIES: CPT

## 2021-04-15 RX ADMIN — IOPAMIDOL 100 ML: 755 INJECTION, SOLUTION INTRAVENOUS at 13:46

## 2021-06-17 ENCOUNTER — HOSPITAL ENCOUNTER (EMERGENCY)
Age: 81
Discharge: HOME | End: 2021-06-17
Attending: EMERGENCY MEDICINE
Payer: MEDICARE

## 2021-06-17 ENCOUNTER — APPOINTMENT (OUTPATIENT)
Dept: CT IMAGING | Age: 81
End: 2021-06-17
Payer: MEDICARE

## 2021-06-17 ENCOUNTER — APPOINTMENT (OUTPATIENT)
Dept: GENERAL RADIOLOGY | Age: 81
End: 2021-06-17
Payer: MEDICARE

## 2021-06-17 VITALS
SYSTOLIC BLOOD PRESSURE: 110 MMHG | WEIGHT: 227 LBS | HEART RATE: 108 BPM | OXYGEN SATURATION: 96 % | TEMPERATURE: 97.4 F | DIASTOLIC BLOOD PRESSURE: 84 MMHG | BODY MASS INDEX: 37.77 KG/M2 | RESPIRATION RATE: 24 BRPM
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
## 2021-06-17 DIAGNOSIS — S80.01XA CONTUSION OF RIGHT KNEE, INITIAL ENCOUNTER: ICD-10-CM

## 2021-06-17 PROCEDURE — 99284 EMERGENCY DEPT VISIT MOD MDM: CPT

## 2021-06-17 PROCEDURE — 70450 CT HEAD/BRAIN W/O DYE: CPT

## 2021-06-17 PROCEDURE — 6370000000 HC RX 637 (ALT 250 FOR IP): Performed by: EMERGENCY MEDICINE

## 2021-06-17 PROCEDURE — 73560 X-RAY EXAM OF KNEE 1 OR 2: CPT

## 2021-06-17 RX ORDER — TRAMADOL HYDROCHLORIDE 50 MG/1
50 TABLET ORAL 3 TIMES DAILY
COMMUNITY

## 2021-06-17 RX ORDER — FAMOTIDINE 20 MG/1
20 TABLET, FILM COATED ORAL NIGHTLY
COMMUNITY

## 2021-06-17 RX ORDER — ARGININE/GLUTAMINE/CALCIUM BMB 7G-7G-1.5G
1 POWDER IN PACKET (EA) ORAL 2 TIMES DAILY
COMMUNITY

## 2021-06-17 RX ORDER — HALOPERIDOL 2 MG/1
2 TABLET ORAL 4 TIMES DAILY
COMMUNITY

## 2021-06-17 RX ORDER — HALOPERIDOL 1 MG/1
2 TABLET ORAL ONCE
Status: COMPLETED | OUTPATIENT
Start: 2021-06-17 | End: 2021-06-17

## 2021-06-17 RX ORDER — TRAZODONE HYDROCHLORIDE 50 MG/1
50 TABLET ORAL NIGHTLY
COMMUNITY

## 2021-06-17 RX ORDER — DIVALPROEX SODIUM 125 MG/1
125 TABLET, DELAYED RELEASE ORAL 2 TIMES DAILY
COMMUNITY

## 2021-06-17 RX ADMIN — HALOPERIDOL 2 MG: 1 TABLET ORAL at 06:48

## 2021-06-17 RX ADMIN — DILTIAZEM HYDROCHLORIDE 30 MG: 30 TABLET, FILM COATED ORAL at 06:15

## 2021-06-17 ASSESSMENT — PAIN DESCRIPTION - ORIENTATION: ORIENTATION: RIGHT

## 2021-06-17 ASSESSMENT — PAIN DESCRIPTION - DESCRIPTORS: DESCRIPTORS: ACHING

## 2021-06-17 ASSESSMENT — PAIN DESCRIPTION - PAIN TYPE: TYPE: ACUTE PAIN

## 2021-06-17 ASSESSMENT — PAIN DESCRIPTION - LOCATION: LOCATION: KNEE

## 2021-06-17 ASSESSMENT — PAIN SCALES - GENERAL: PAINLEVEL_OUTOF10: 5

## 2021-06-17 NOTE — PROGRESS NOTES
Daughter Leyda Leader given another update on patient HR. She states that its not abnormal for her to have her HR in the 130's.  Patient is asymptomatic with her afib rvr,

## 2021-06-17 NOTE — PROGRESS NOTES
Cristina Yen, patient daughter/guardian given an update on patient condition. All questions answered from nursing perspective.

## 2021-06-17 NOTE — ED PROVIDER NOTES
EMERGENCY DEPARTMENT ENCOUNTER      CHIEF COMPLAINT    Chief Complaint   Patient presents with    Fall     pt from [facility] and nurse states patient found laying beside bed face down. Nurse believes patient fell, patient had a bruise to right cheek and right knee pain. She is alert to herself which is her baseline       HPI    Ad Vides is a 68 y.o. female who presentsto ED from nursing home  By EMS. With complaint of fall. Onset tonight. Patient was found laying on the floor, beside her bed, face down. Patient presents with right knee injury. Patient is on Eliquis. Patient has history of A. fib. Patient has history of dementia. Patient is mostly bedbound.     PAST MEDICAL HISTORY    Past Medical History:   Diagnosis Date    Anemia     Chicken pox     Chronic back pain     Dementia ([address]     [facility] mellitus [facility]     Gastroesophageal reflux disease without esophagitis 12/7/2017    Headache     Hypertension     Measles     Mumps     Osteoarthritis     Whooping cough        SURGICAL HISTORY    Past Surgical History:   Procedure Laterality Date    CARPAL TUNNEL RELEASE Left 2005    JOINT REPLACEMENT Right 2004    right knee    JOINT REPLACEMENT Left 2005    left knee    JOINT REPLACEMENT Right 2016    right knee    JOINT REPLACEMENT Right 2001    right hip    JOINT REPLACEMENT Left 2006    left hip    TOE SURGERY Left 2006    joint removed from 2nd toe       CURRENT MEDICATIONS    Current Outpatient Rx   Medication Sig Dispense Refill    dilTIAZem (CARDIZEM) 30 MG tablet Take 30 mg by mouth 3 times daily      divalproex (DEPAKOTE) 125 MG DR tablet Take 125 mg by mouth 2 times daily      apixaban (ELIQUIS) 5 MG TABS tablet Take by mouth 2 times daily      famotidine (PEPCID) 20 MG tablet Take 20 mg by mouth nightly      haloperidol (HALDOL) 2 MG tablet Take 2 mg by mouth 4 times daily      Nutritional Supplements (MICHAEL) PACK Take 1 each by mouth 2 times daily      traMADol (ULTRAM) 50 MG tablet Take 50 mg by mouth 3 times daily.       traZODone (DESYREL) 50 MG tablet Take 50 mg by mouth nightly      DULoxetine (CYMBALTA) 60 MG extended release capsule Take 1 capsule by mouth every morning 30 capsule 3    metoprolol tartrate (LOPRESSOR) 25 MG tablet Take 0.5 tablets by mouth 2 times daily 60 tablet 3    metFORMIN (GLUCOPHAGE) 500 MG tablet Take 500 mg by mouth 2 times daily (with meals)      glimepiride (AMARYL) 1 MG tablet Take 1 mg by mouth 2 times daily      losartan (COZAAR) 100 MG tablet Take 1 tablet by mouth daily 30 tablet 5    oxybutynin (DITROPAN-XL) 5 MG extended release tablet Take 1 tablet by mouth nightly 30 tablet 3    acetaminophen (TYLENOL) 500 MG tablet Take 1,000 mg by mouth As needed not daily      Multiple Vitamins-Minerals (THERAPEUTIC MULTIVITAMIN-MINERALS) tablet Take 1 tablet by mouth daily      furosemide (LASIX) 20 MG tablet Take 1 tablet by mouth daily for 3 days 3 tablet 0       ALLERGIES    Allergies   Allergen Reactions    Penicillins Hives    Sulfa Antibiotics Hives    Aspirin Other (See Comments)     Nose bleeds in high doses         FAMILY HISTORY    Family History   Problem Relation Age of Onset    High Blood Pressure Mother     Parkinsonism Mother     High Blood Pressure Father     Diabetes Father     Heart Attack Father     Diabetes Sister     High Blood Pressure Sister     Arrhythmia Sister     Diabetes Brother     High Blood Pressure Brother        SOCIAL HISTORY    Social History     Socioeconomic History    Marital status:      Spouse name: None    Number of children: None    Years of education: None    Highest education level: None   Occupational History    None   Tobacco Use    Smoking status: Never Smoker    Smokeless tobacco: Never Used   Substance and Sexual Activity    Alcohol use: No    Drug use: No    Sexual activity: None   Other Topics Concern    None   Social History Narrative    None     Social Determinants of Health     Financial Resource Strain: Low Risk     Difficulty of Paying Living Expenses: Not hard at all   Food Insecurity: No Food Insecurity    Worried About Running Out of Food in the Last Year: Never true    Kapil of Food in the Last Year: Never true   Transportation Needs: No Transportation Needs    Lack of Transportation (Medical): No    Lack of Transportation (Non-Medical): No   Physical Activity:     Days of Exercise per Week:     Minutes of Exercise per Session:    Stress:     Feeling of Stress :    Social Connections:     Frequency of Communication with Friends and Family:     Frequency of Social Gatherings with Friends and Family:     Attends Holiness Services:     Active Member of Clubs or Organizations:     Attends Club or Organization Meetings:     Marital Status:    Intimate Partner Violence:     Fear of Current or Ex-Partner:     Emotionally Abused:     Physically Abused:     Sexually Abused:            Review of Systems:  Constitutional:  Denies fever, chills, weight loss or weakness   Eyes:  Denies photophobia or discharge   HENT:  Denies sore throat or ear pain   Respiratory:  Denies cough or shortness of breath   Cardiovascular:  Denies chest pain, palpitations or swelling   GI:  Denies abdominal pain, nausea, vomiting, or diarrhea   Musculoskeletal: Right knee injury  Skin:  Denies rash   Neurologic:  Denies headache, focal weakness or sensory changes   Endocrine:  Denies polyuria or polydypsia   Lymphatic:  Denies swollen glands   Psychiatric: Positive for dementia  All systems negative except as marked.      PHYSICAL EXAM    VITAL SIGNS: /88   Pulse 130   Temp 97.4 °F (36.3 °C)   Resp 16   Wt 227 lb (103 kg)   SpO2 97%   BMI 37.77 kg/m²    Constitutional: Elderly female in no acute distress HENT:  Normocephalic, Atraumatic, Bilateral external ears normal, Oropharynx moist, No oral exudates, Nose normal. Neck- Normal range of motion, No tenderness, Supple, No stridor. Eyes:  PERRL, EOMI, Conjunctiva normal, No discharge. Respiratory:  Normal breath sounds, No respiratory distress, No wheezing, No chest tenderness. Cardiovascular: Irregular tachycardic rhythm   GI:  Bowel sounds normal, Soft, No tenderness, No masses, No pulsatile masses. : External genitalia appear normal, No masses or lesions. No discharge. No CVA tenderness. Musculoskeletal: Right knee abrasion.,  No deformities. No pain with passive movement of the extremities integument:  Warm, Dry, No erythema, No rash. Lymphatic:  No lymphadenopathy noted. Neurologic:  Alert & oriented x 3, Normal motor function, Normal sensory function, No focal deficits noted. Psychiatric:  Affect normal, Judgment normal, Mood normal.     EKG        RADIOLOGY    XR KNEE RIGHT (1-2 VIEWS)   Final Result      Total right knee arthroplasty. Diffuse osteopenia. FOLLOW UP: Follow-up as clinically indicated. CT HEAD WO CONTRAST   Final Result      Chronic involutional and periventricular white matter microangiopathic ischemic    changes. No acute intracranial abnormality. Study limited by patient motion. FOLLOW-UP: Follow-up as clinically indicated. Dose reduction techniques were achieved by using automated exposure control    and/or adjustment of mA and/or kV according to patient size and/or use of    iterative reconstruction technique. PROCEDURES        Labs  Labs Reviewed - No data to display          Summation      Patient Course: CT head with no acute findings. X-ray of the right knee with no acute findings. Patient will be sent back to nursing home. ED Medications administered this visit:  Medications - No data to display    New Prescriptions from this visit:    New Prescriptions    No medications on file       Follow-up:  No follow-up provider specified. Final Impression:   1. Fall, initial encounter    2.  Contusion of right knee, initial encounter               (Please note that portions of this note were completed with a voice recognition program.  Efforts were made to edit the dictations but occasionally words are mis-transcribed.)       Meño Haskins MD  06/17/21 5539

## 2021-06-17 NOTE — ED NOTES
This nurse spoke with Shey at [facility] and they will be here within 1 hour to transport patient.      Princess Cobian RN  06/17/21 5203

## 2021-06-20 ENCOUNTER — APPOINTMENT (OUTPATIENT)
Dept: CT IMAGING | Age: 81
End: 2021-06-20
Payer: MEDICARE

## 2021-06-20 ENCOUNTER — HOSPITAL ENCOUNTER (EMERGENCY)
Age: 81
Discharge: NURSING, REHAB OR LONG-TERM CARE | End: 2021-06-20
Attending: INTERNAL MEDICINE
Payer: MEDICARE

## 2021-06-20 VITALS
WEIGHT: 208 LBS | TEMPERATURE: 97 F | DIASTOLIC BLOOD PRESSURE: 60 MMHG | SYSTOLIC BLOOD PRESSURE: 104 MMHG | OXYGEN SATURATION: 98 % | RESPIRATION RATE: 20 BRPM | BODY MASS INDEX: 34.61 KG/M2 | HEART RATE: 98 BPM
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
## 2021-06-20 PROCEDURE — 99285 EMERGENCY DEPT VISIT HI MDM: CPT

## 2021-06-20 PROCEDURE — 72125 CT NECK SPINE W/O DYE: CPT

## 2021-06-20 PROCEDURE — 70450 CT HEAD/BRAIN W/O DYE: CPT

## 2021-06-20 RX ORDER — LORAZEPAM 1 MG/1
1 TABLET ORAL EVERY 6 HOURS PRN
COMMUNITY

## 2021-06-20 RX ORDER — LOPERAMIDE HYDROCHLORIDE 2 MG/1
2 CAPSULE ORAL 4 TIMES DAILY PRN
COMMUNITY

## 2021-06-20 RX ORDER — ALBUTEROL SULFATE 90 UG/1
2 AEROSOL, METERED RESPIRATORY (INHALATION) EVERY 6 HOURS PRN
COMMUNITY

## 2021-06-20 NOTE — ED PROVIDER NOTES
[facility] ED  EMERGENCY DEPARTMENT ENCOUNTER      Pt Name: Matilda Patel  MRN: [id number]  [place] 1940  Date of evaluation: 6/20/2021  Provider: Noble Swartz MD    CHIEF COMPLAINT       Chief Complaint   Patient presents with    Fall     report from [facility], called to [facility] for complaint of pt falling out of bed. Pt arrives in vaccum splint. HISTORY OF PRESENT ILLNESS   (Location/Symptom, Timing/Onset, Context/Setting, Quality, Duration, Modifying Factors, Severity)  Note limiting factors. Matilda Patel is a 68 y.o. female who has a history of dementia, anxiety disorder, hypertension, GERD, diabetes, anxiety, CAD, atrial fibrillation on Eliquis, unspecified encephalopathy, spinal stenosis of the cervical region, history of falls, presents to the emergency department from [facility] for evaluation and management of injuries after she was found on the floor having fallen out of bed. She was lying on the pad next to her bed. It is estimated the bed was elevated at 3 feet by the EMT but this could not be confirmed. Patient refused to come to the ED and denies any pain. She refused to wear a protective cervical collar in route. She is [facility]. No call from staff at [facility]. Ambulation status not clear. Records from her cardiologist Dr. Valentino Olvera on 4/13/2020 once she states that she is minimally communicative and totally wheelchair-bound. HPI    Nursing Notes were reviewed. REVIEW OF SYSTEMS    (2-9 systems for level 4, 10 or more for level 5)       REVIEW OF SYSTEMS    Constitutional: Negative for fatigue and fever. HENT: Negative for dental problem, ear pain and sore throat. Eyes: Negative for pain and redness. Respiratory: Negative for cough, chest tightness and shortness of breath. Cardiovascular: Negative for chest pain, palpitations and leg swelling. Gastrointestinal: Negative for abdominal distention, abdominal pain, diarrhea, nausea and vomiting. Musculoskeletal: Negative for arthralgias, back pain and neck pain. Skin: Negative for wound, laceration, color change, pallor, rash   Neurological: Negative for known head injury, known loss of consciousness, dizziness, speech difficulty, weakness, distal tingling/numbness and headaches. Hematological: Positive for anticoagulation, negative for adenopathy. All other systems reviewed and are negative. Except as noted above the remainder of the review of systems was reviewed and negative.        PASTMEDICAL HISTORY     Past Medical History:   Diagnosis Date    Anemia     Chicken pox     Chronic back pain     Dementia ([facility] [address]     Diabetes mellitus ([facility] [address]     Gastroesophageal reflux disease without esophagitis 12/7/2017    Headache     Hypertension     Measles     Mumps     Osteoarthritis     Whooping cough          SURGICAL HISTORY       Past Surgical History:   Procedure Laterality Date    CARPAL TUNNEL RELEASE Left 2005    JOINT REPLACEMENT Right 2004    right knee    JOINT REPLACEMENT Left 2005    left knee    JOINT REPLACEMENT Right 2016    right knee    JOINT REPLACEMENT Right 2001    right hip    JOINT REPLACEMENT Left 2006    left hip    TOE SURGERY Left 2006    joint removed from 2nd toe         CURRENT MEDICATIONS       Previous Medications    ACETAMINOPHEN (TYLENOL) 500 MG TABLET    Take 1,000 mg by mouth As needed not daily    ALBUTEROL SULFATE  (90 BASE) MCG/ACT INHALER    Inhale 2 puffs into the lungs every 6 hours as needed    APIXABAN (ELIQUIS) 5 MG TABS TABLET    Take by mouth 2 times daily    DILTIAZEM (CARDIZEM) 30 MG TABLET    Take 30 mg by mouth 3 times daily    DIVALPROEX (DEPAKOTE) 125 MG DR TABLET    Take 125 mg by mouth 2 times daily    DULOXETINE (CYMBALTA) 60 MG EXTENDED RELEASE CAPSULE    Take 1 capsule by mouth every morning    FAMOTIDINE (PEPCID) 20 MG TABLET    Take 20 mg by mouth nightly    FUROSEMIDE (LASIX) 20 MG TABLET    Take 1 tablet by mouth daily for 3 days    GLIMEPIRIDE (AMARYL) 1 MG TABLET    Take 1 mg by mouth 2 times daily    HALOPERIDOL (HALDOL) 2 MG TABLET    Take 2 mg by mouth 4 times daily    LOPERAMIDE (IMODIUM) 2 MG CAPSULE    Take 2 mg by mouth 4 times daily as needed    LORAZEPAM (ATIVAN) 1 MG TABLET    Take 1 mg by mouth every 6 hours as needed. LOSARTAN (COZAAR) 100 MG TABLET    Take 1 tablet by mouth daily    METFORMIN (GLUCOPHAGE) 500 MG TABLET    Take 500 mg by mouth 2 times daily (with meals)    METOPROLOL TARTRATE (LOPRESSOR) 25 MG TABLET    Take 0.5 tablets by mouth 2 times daily    MULTIPLE VITAMINS-MINERALS (THERAPEUTIC MULTIVITAMIN-MINERALS) TABLET    Take 1 tablet by mouth daily    NUTRITIONAL SUPPLEMENTS (MICHAEL) PACK    Take 1 each by mouth 2 times daily    OXYBUTYNIN (DITROPAN-XL) 5 MG EXTENDED RELEASE TABLET    Take 1 tablet by mouth nightly    TRAMADOL (ULTRAM) 50 MG TABLET    Take 50 mg by mouth 3 times daily.     TRAZODONE (DESYREL) 50 MG TABLET    Take 50 mg by mouth nightly       ALLERGIES     Penicillins, Sulfa antibiotics, Oxycodone, and Aspirin    FAMILY HISTORY       Family History   Problem Relation Age of Onset    High Blood Pressure Mother     Parkinsonism Mother     High Blood Pressure Father     Diabetes Father     Heart Attack Father     Diabetes Sister     High Blood Pressure Sister     Arrhythmia Sister     Diabetes Brother     High Blood Pressure Brother           SOCIAL HISTORY       Social History     Socioeconomic History    Marital status:      Spouse name: None    Number of children: None    Years of education: None    Highest education level: None   Occupational History    None   Tobacco Use    Smoking status: Never Smoker    Smokeless tobacco: Never Used   Substance and Sexual Activity    Alcohol use: No    Drug use: No    Sexual activity: None   Other Topics Concern    None   Social History Narrative    None     Social Determinants of Health     Financial Resource Strain: Low Risk     Difficulty of Paying Living Expenses: Not hard at all   Food Insecurity: No Food Insecurity    Worried About Running Out of Food in the Last Year: Never true    Kapil of Food in the Last Year: Never true   Transportation Needs: No Transportation Needs    Lack of Transportation (Medical): No    Lack of Transportation (Non-Medical): No   Physical Activity:     Days of Exercise per Week:     Minutes of Exercise per Session:    Stress:     Feeling of Stress :    Social Connections:     Frequency of Communication with Friends and Family:     Frequency of Social Gatherings with Friends and Family:     Attends [employer] Services:     Active Member of Clubs or Organizations:     Attends Club or Organization Meetings:     Marital Status:    Intimate Partner Violence:     Fear of Current or Ex-Partner:     Emotionally Abused:     Physically Abused:     Sexually Abused:        SCREENINGS    [place] Coma Scale  Eye Opening: Spontaneous  Best Verbal Response: Confused  Best Motor Response: Obeys commands  Mitch Coma Scale Score: 14        PHYSICAL EXAM    (up to 7 for level 4, 8 or more for level 5)     ED Triage Vitals [06/20/21 0522]   BP Temp Temp Source Pulse Resp SpO2 Height Weight   104/60 97 °F (36.1 °C) Temporal 98 20 98 % -- 208 lb (94.3 kg)       Physical Exam  Physical Exam   Constitutional:  Appears well, well-developed and well-nourished. No distress noted. Non toxic in appearance. Patient arrived strained on a backboard and was carefully evaluated with restraints sequentially removed. She was logrolled and her back was assessed for injuries. Currently she is. Lying angled towards the right without a lot of spontaneous movement. HENT:     Head: Normocephalic and atraumatic. No palpable tenderness. Right Ear: External ear normal. No otorrhea or bleeding observed. TM normal pearly light reflex without hemotympanum, mastoid tenderness, Santana sign.     Left Ear: External ear normal.  No otorrhea or bleeding observed. TM normal pearly light reflex without hemotympanum, mastoid tenderness, Santana sign. Nose: Nose normal. No rhinorrhea or bleeding observed. Mouth/Throat: Oropharynx is clear and mucosa moist. No oropharyngeal exudate noted. Posterior pharynx is pink and noninjected. Eyes: Conjunctivae and EOM are normal. Pupils are equal, round, and reactive to light. No scleral icterus. No tearing or drainage. Neck: Normal range of motion. Neck supple. No tracheal deviation present. No spinous tenderness or step-offs identified on palpation. There is no paraspinous tenderness. No neck pain elicited with axial loading. Cardiovascular: Normal rate, regular rhythm, normal heart sounds and intact distal pulses. Exam reveals no gallop or friction rub. No murmur heard. Pulmonary/Chest: Effort normal and breath sounds are symmetric and normal. No respiratory distress. There are no wheezes, rales or rhonchi. No tenderness is exhibited upon palpation of the chest wall. Abdominal: Soft and obese. Bowel sounds are normal. No distension or no mass exhibitted. No organomegaly. There is no tenderness, rebound, rigidity or guarding. Genitourinary:   No CVA tenderness noted on examination. Musculoskeletal: No spinous tenderness or step-offs identified on palpation. No paraspinous tenderness. Normal range of motion of upper extremities but not of the lower. No edema, tenderness but legs appear to have contractures at the ankles. No pain in the pelvis upon compression of the hips. No leg length discrepancy. No leg foreshortening. Lymphadenopathy:  No cervical adenopathy. Neurological:   Alert and oriented to person, place, and time. Reflexes are normal.  There are no cranial nerve deficits. Normal muscle tone, motor and sensory function exhibitted. Strength 5/5 in all extremities and torso. Coordination and gait not tested. She moved her toes and feet on command.   She 104/60   Pulse: 98   Resp: 20   Temp: 97 °F (36.1 °C)   TempSrc: Temporal   SpO2: 98%   Weight: 208 lb (94.3 kg)         Noted    MDM    CRITICAL CARE TIME   Total Critical Care time was 12 minutes, excluding separately reportable procedures. This included evaluation of data, bedside patient evaluation and care, contact and communication with other providers and hospitals and contact and communication with family. There was a high probability of clinically significant/ life threatening deterioration in the patient's condition which required my urgent intervention. EDCOURSE       CONSULTS:  None    PROCEDURES:  Unless otherwise noted below, none     Procedures      Summation      Teddy Coleman is a 68 y.o. female who has a history of hypertension, GERD, diabetes, anxiety, atrial fibrillation on Eliquis, unspecified encephalopathy, spinal stenosis of the cervical region, resented from [facility] [place] after an unwitnessed fall from her bed. Patient denied injury and no injuries were identified. CT of the head and cervical spine did not show any evidence of acute fracture, bleeds or subluxation. She is without identifiable injury, well hydrated, nontoxic, hemodynamically stable, neurologically at baseline, and satisfactory for discharge to return to [address] [place] [address]. Findings discussed at length with patient and she continues to deny pain and injury. I gave her ample opportunity to ask questions for which they did not have any. I instructed the patient to followup with the PCP for evaluation of response to management of occult injuries as needed. Instructions were provided to the nursing home as well to follow-up with her doctor if injuries are identified or return to this ER for treatment. I instructed the patient to return to the ER if her condition worsens, if there is any concern for altered mental status, difficulty breathing, dehydration or loss of function.         ED

## 2023-02-02 NOTE — PROGRESS NOTES
Met with daughter and discussed options for care at discharge. Daughter cannot physically care for Patient in her current condition, per her own self report. Will seek placement at [facility] and is aware there will be some out of pocket cost that is the Patients responsibility. Daughter states that she has \"brought the checkbook\" and will pay her mother's copay responsibility today. [facility]ENS paperwork completed per this writer. Christy Jacques 58, who states that [facility] will accept Patient whenever she is discharged.     [address] [facility]  7/17/2020 patient verbalized understanding./DC instructions